# Patient Record
Sex: FEMALE | Race: WHITE | Employment: OTHER | URBAN - METROPOLITAN AREA
[De-identification: names, ages, dates, MRNs, and addresses within clinical notes are randomized per-mention and may not be internally consistent; named-entity substitution may affect disease eponyms.]

---

## 2017-02-23 RX ORDER — LORATADINE 10 MG/1
TABLET ORAL
Qty: 30 TABLET | Refills: 10 | Status: SHIPPED | OUTPATIENT
Start: 2017-02-23 | End: 2019-01-12 | Stop reason: SDUPTHER

## 2017-02-23 RX ORDER — DIPHENHYDRAMINE HCL 25 MG
CAPSULE ORAL
Qty: 30 CAPSULE | Refills: 10 | Status: SHIPPED | OUTPATIENT
Start: 2017-02-23 | End: 2019-01-12 | Stop reason: SDUPTHER

## 2017-02-23 RX ORDER — CLOMIPRAMINE HYDROCHLORIDE 50 MG/1
CAPSULE ORAL
Qty: 60 CAPSULE | Refills: 4 | Status: SHIPPED | OUTPATIENT
Start: 2017-02-23 | End: 2017-08-29 | Stop reason: SDUPTHER

## 2017-02-24 RX ORDER — PROPRANOLOL HYDROCHLORIDE 20 MG/1
TABLET ORAL
Qty: 180 TABLET | Refills: 4 | Status: SHIPPED | OUTPATIENT
Start: 2017-02-24 | End: 2017-08-29 | Stop reason: SDUPTHER

## 2017-03-09 RX ORDER — PERPHENAZINE 2 MG/1
TABLET, FILM COATED ORAL
Qty: 30 TABLET | Refills: 5 | Status: SHIPPED | OUTPATIENT
Start: 2017-03-09 | End: 2019-10-11 | Stop reason: SDUPTHER

## 2017-05-10 ENCOUNTER — OFFICE VISIT (OUTPATIENT)
Dept: INTERNAL MEDICINE | Age: 52
End: 2017-05-10

## 2017-05-10 VITALS
WEIGHT: 228 LBS | HEIGHT: 66 IN | DIASTOLIC BLOOD PRESSURE: 78 MMHG | SYSTOLIC BLOOD PRESSURE: 110 MMHG | BODY MASS INDEX: 36.64 KG/M2

## 2017-05-10 DIAGNOSIS — F84.0 AUTISM: Primary | ICD-10-CM

## 2017-05-10 DIAGNOSIS — F42.9 OCD (OBSESSIVE COMPULSIVE DISORDER): ICD-10-CM

## 2017-05-10 DIAGNOSIS — E66.9 OBESITY (BMI 35.0-39.9 WITHOUT COMORBIDITY): ICD-10-CM

## 2017-05-10 PROCEDURE — G8417 CALC BMI ABV UP PARAM F/U: HCPCS | Performed by: INTERNAL MEDICINE

## 2017-05-10 PROCEDURE — G8428 CUR MEDS NOT DOCUMENT: HCPCS | Performed by: INTERNAL MEDICINE

## 2017-05-10 PROCEDURE — 99213 OFFICE O/P EST LOW 20 MIN: CPT | Performed by: INTERNAL MEDICINE

## 2017-05-10 PROCEDURE — 3014F SCREEN MAMMO DOC REV: CPT | Performed by: INTERNAL MEDICINE

## 2017-05-10 PROCEDURE — 1036F TOBACCO NON-USER: CPT | Performed by: INTERNAL MEDICINE

## 2017-05-10 PROCEDURE — 3017F COLORECTAL CA SCREEN DOC REV: CPT | Performed by: INTERNAL MEDICINE

## 2017-05-10 ASSESSMENT — ENCOUNTER SYMPTOMS
CHEST TIGHTNESS: 0
EYE REDNESS: 0
COUGH: 0
ABDOMINAL PAIN: 0
SHORTNESS OF BREATH: 0
NAUSEA: 0
BACK PAIN: 0

## 2017-06-05 RX ORDER — EPINEPHRINE 0.3 MG/.3ML
INJECTION INTRAMUSCULAR
Qty: 2 EACH | Refills: 3 | Status: SHIPPED | OUTPATIENT
Start: 2017-06-05 | End: 2022-03-02 | Stop reason: SDUPTHER

## 2017-06-07 ENCOUNTER — TELEPHONE (OUTPATIENT)
Dept: INTERNAL MEDICINE | Age: 52
End: 2017-06-07

## 2017-07-28 RX ORDER — SERTRALINE HYDROCHLORIDE 100 MG/1
TABLET, FILM COATED ORAL
Qty: 45 TABLET | Refills: 5 | Status: SHIPPED | OUTPATIENT
Start: 2017-07-28 | End: 2018-12-18 | Stop reason: SDUPTHER

## 2017-08-22 ENCOUNTER — OFFICE VISIT (OUTPATIENT)
Dept: INTERNAL MEDICINE | Age: 52
End: 2017-08-22

## 2017-08-22 VITALS
DIASTOLIC BLOOD PRESSURE: 60 MMHG | BODY MASS INDEX: 37.65 KG/M2 | HEIGHT: 65 IN | WEIGHT: 226 LBS | SYSTOLIC BLOOD PRESSURE: 102 MMHG

## 2017-08-22 DIAGNOSIS — E66.9 OBESITY (BMI 35.0-39.9 WITHOUT COMORBIDITY): ICD-10-CM

## 2017-08-22 DIAGNOSIS — F42.2 MIXED OBSESSIONAL THOUGHTS AND ACTS: Primary | ICD-10-CM

## 2017-08-22 DIAGNOSIS — B35.3 TINEA PEDIS OF RIGHT FOOT: ICD-10-CM

## 2017-08-22 DIAGNOSIS — F79 MENTAL RETARDATION: ICD-10-CM

## 2017-08-22 PROCEDURE — 3014F SCREEN MAMMO DOC REV: CPT | Performed by: INTERNAL MEDICINE

## 2017-08-22 PROCEDURE — G8417 CALC BMI ABV UP PARAM F/U: HCPCS | Performed by: INTERNAL MEDICINE

## 2017-08-22 PROCEDURE — G8427 DOCREV CUR MEDS BY ELIG CLIN: HCPCS | Performed by: INTERNAL MEDICINE

## 2017-08-22 PROCEDURE — 1036F TOBACCO NON-USER: CPT | Performed by: INTERNAL MEDICINE

## 2017-08-22 PROCEDURE — 99213 OFFICE O/P EST LOW 20 MIN: CPT | Performed by: INTERNAL MEDICINE

## 2017-08-22 PROCEDURE — 3017F COLORECTAL CA SCREEN DOC REV: CPT | Performed by: INTERNAL MEDICINE

## 2017-08-29 RX ORDER — PROPRANOLOL HYDROCHLORIDE 20 MG/1
20 TABLET ORAL 3 TIMES DAILY
Qty: 180 TABLET | Refills: 4 | Status: SHIPPED | OUTPATIENT
Start: 2017-08-29 | End: 2017-08-31 | Stop reason: SDUPTHER

## 2017-08-29 RX ORDER — CLOMIPRAMINE HYDROCHLORIDE 50 MG/1
CAPSULE ORAL
Qty: 60 CAPSULE | Refills: 5 | Status: SHIPPED | OUTPATIENT
Start: 2017-08-29 | End: 2019-10-11 | Stop reason: SDUPTHER

## 2017-08-31 ENCOUNTER — TELEPHONE (OUTPATIENT)
Dept: INTERNAL MEDICINE | Age: 52
End: 2017-08-31

## 2017-08-31 RX ORDER — PROPRANOLOL HYDROCHLORIDE 20 MG/1
40 TABLET ORAL 3 TIMES DAILY
Qty: 180 TABLET | Refills: 4 | OUTPATIENT
Start: 2017-08-31 | End: 2019-08-20 | Stop reason: SDUPTHER

## 2017-09-21 ENCOUNTER — TELEPHONE (OUTPATIENT)
Dept: INTERNAL MEDICINE | Age: 52
End: 2017-09-21

## 2017-09-22 ENCOUNTER — TELEPHONE (OUTPATIENT)
Dept: INTERNAL MEDICINE | Age: 52
End: 2017-09-22

## 2017-10-03 ENCOUNTER — HOSPITAL ENCOUNTER (OUTPATIENT)
Dept: OTHER | Age: 52
Discharge: HOME | End: 2017-10-03
Attending: INTERNAL MEDICINE | Admitting: INTERNAL MEDICINE

## 2017-10-03 ENCOUNTER — OFFICE VISIT (OUTPATIENT)
Dept: INTERNAL MEDICINE | Age: 52
End: 2017-10-03

## 2017-10-03 VITALS
WEIGHT: 227 LBS | HEIGHT: 70 IN | DIASTOLIC BLOOD PRESSURE: 60 MMHG | SYSTOLIC BLOOD PRESSURE: 106 MMHG | BODY MASS INDEX: 32.5 KG/M2

## 2017-10-03 DIAGNOSIS — M25.552 LEFT HIP PAIN: ICD-10-CM

## 2017-10-03 DIAGNOSIS — M25.552 LEFT HIP PAIN: Primary | ICD-10-CM

## 2017-10-03 PROCEDURE — 3014F SCREEN MAMMO DOC REV: CPT | Performed by: INTERNAL MEDICINE

## 2017-10-03 PROCEDURE — 99213 OFFICE O/P EST LOW 20 MIN: CPT | Performed by: INTERNAL MEDICINE

## 2017-10-03 PROCEDURE — 3017F COLORECTAL CA SCREEN DOC REV: CPT | Performed by: INTERNAL MEDICINE

## 2017-10-03 PROCEDURE — G8427 DOCREV CUR MEDS BY ELIG CLIN: HCPCS | Performed by: INTERNAL MEDICINE

## 2017-10-03 PROCEDURE — G8484 FLU IMMUNIZE NO ADMIN: HCPCS | Performed by: INTERNAL MEDICINE

## 2017-10-03 PROCEDURE — G8417 CALC BMI ABV UP PARAM F/U: HCPCS | Performed by: INTERNAL MEDICINE

## 2017-10-03 PROCEDURE — 1036F TOBACCO NON-USER: CPT | Performed by: INTERNAL MEDICINE

## 2017-10-03 ASSESSMENT — ENCOUNTER SYMPTOMS
SHORTNESS OF BREATH: 0
NAUSEA: 0
BACK PAIN: 0
COUGH: 0
EYE REDNESS: 0
ABDOMINAL PAIN: 0
CHEST TIGHTNESS: 0

## 2017-10-03 NOTE — PROGRESS NOTES
Subjective:      Patient ID: Daniella Sagastume is a 46 y.o. female. Chief Complaint   Patient presents with    Foot Pain     left foot and leg pain       HPI  Solitario Hoff is here with her sister. Solitario Hoff has developmental delay and autism. She does not communicate verbally. She is walking with a slight limp on her left leg. This has been occurring over the last year. She had a sprain ankle earlier in the year. She does not verbalize any pain. Solitario Hoff is overweight. Current Outpatient Prescriptions on File Prior to Visit   Medication Sig Dispense Refill    propranolol (INDERAL) 20 MG tablet Take 2 tablets by mouth 3 times daily 180 tablet 4    clomiPRAMINE (ANAFRANIL) 50 MG capsule TAKE TWO CAPSULES BY MOUTH EVERY DAY AT BEDTIME 60 capsule 5    Disposable Gloves (VINYL GLOVES MEDIUM) [facility] FACILITY REQUESTING ORDER FOR EXAM GLOVES 100 each 5    sertraline (ZOLOFT) 100 MG tablet TAKE 1 & 1/2 TABLETS (150MG) BY MOUTH EVERY DAY 45 tablet 5    EPIPEN 2-JAG 0.3 MG/0.3ML SOAJ injection USE AS DRECTED. TAKE WITH ON ALL OUTINGS. 2 each 3    perphenazine 2 MG tablet TAKE ONE TABLET BY MOUTH EVERY DAY AT BEDTIME 30 tablet 5    diphenhydrAMINE (BENADRYL) 25 MG capsule TAKE ONE CAPSULE BY MOUTH EVERY DAY AT BEDTIME 30 capsule 10    loratadine (CLARITIN) 10 MG tablet TAKE ONE TABLET BY MOUTH EVERY DAY 30 tablet 10    Clotrimazole 1 % OINT Apply 1 each topically 2 times daily as needed (rash) 60 g 2    BANOPHEN 25 MG tablet TAKE 1 TABLET BY MOUTH NIGHTLY (DIPHENHYDRAMINE) 30 tablet 2    triamcinolone (KENALOG) 0.1 % cream Apply topically 2 times daily. 30 g 1    polyethylene glycol (GLYCOLAX) powder Take 17 g by mouth nightly.  EPINEPHrine (EPIPEN 2-JAG IJ) Inject  as directed. No current facility-administered medications on file prior to visit.       Allergies   Allergen Reactions    Amoxil [Amoxicillin]     Bactrim [Sulfamethoxazole-Trimethoprim]     Codeine          Review of Systems   Constitutional:

## 2017-10-03 NOTE — MR AVS SNAPSHOT
After Visit Summary             Camille Knapp   10/3/2017 1:20 PM   Office Visit    Description:  Female : 1965   Provider:  Rasta Aranda MD   Department:  [facility] [address]              Your Follow-Up and Future Appointments         Below is a list of your follow-up and future appointments. This may not be a complete list as you may have made appointments directly with providers that we are not aware of or your providers may have made some for you. Please call your providers to confirm appointments. It is important to keep your appointments. Please bring your current insurance card, photo ID, co-pay, and all medication bottles to your appointment. If self-pay, payment is expected at the time of service. Your To-Do List     Future Appointments Provider Department Dept Phone    2017 1:00 PM Rasta Aranda MD [facility] [address] [phone]    If this is a sports or school physical please bring the physical form with you. Future Orders Complete By Expires    XR HIP LEFT (2-3 VIEWS) [[id number] Custom]  10/3/2017 10/3/2018    Follow-Up    Return if symptoms worsen or fail to improve. Information from Your Visit        Department     Name Address Phone Fax    [facility] [id number] [place] [address] [phone]      You Were Seen for:         Comments    Left hip pain   [[id number]]         Vital Signs     Blood Pressure Height Weight Body Mass Index Smoking Status       106/60 5' 10\" (1.778 m) 227 lb (103 kg) 32.57 kg/m2 Never Smoker       Additional Information about your Body Mass Index (BMI)           Your BMI as listed above is considered obese (30 or more). BMI is an estimate of body fat, calculated from your height and weight.   The higher your BMI, the greater your risk of heart disease, high blood pressure, type 2 diabetes, stroke, gallstones, arthritis, sleep apnea, and certain

## 2017-10-23 ENCOUNTER — TELEPHONE (OUTPATIENT)
Dept: INTERNAL MEDICINE | Age: 52
End: 2017-10-23

## 2017-10-23 NOTE — TELEPHONE ENCOUNTER
Pt sister calling stating the hip pain is enough that she feels a pain medication would help her sister. Pt's sister is asking what Dr. Andrés Morton recommends for the hip pain and that it be prescribed      DO NOT- send any medications until a call comes in to where the script should go or if a written script will be picked up- 10/24 in the morning a call will let us know what to do.

## 2017-10-25 RX ORDER — NAPROXEN SODIUM 220 MG
220 TABLET ORAL
Qty: 30 TABLET | Refills: 2 | Status: SHIPPED | OUTPATIENT
Start: 2017-10-25 | End: 2018-06-21 | Stop reason: SDUPTHER

## 2017-12-15 ENCOUNTER — OFFICE VISIT (OUTPATIENT)
Dept: INTERNAL MEDICINE | Age: 52
End: 2017-12-15

## 2017-12-15 VITALS
SYSTOLIC BLOOD PRESSURE: 108 MMHG | DIASTOLIC BLOOD PRESSURE: 82 MMHG | WEIGHT: 233 LBS | HEART RATE: 86 BPM | BODY MASS INDEX: 33.43 KG/M2

## 2017-12-15 DIAGNOSIS — Z00.00 WELL ADULT EXAM: ICD-10-CM

## 2017-12-15 DIAGNOSIS — E66.9 OBESITY (BMI 35.0-39.9 WITHOUT COMORBIDITY): ICD-10-CM

## 2017-12-15 DIAGNOSIS — F42.2 MIXED OBSESSIONAL THOUGHTS AND ACTS: ICD-10-CM

## 2017-12-15 DIAGNOSIS — M16.12 PRIMARY OSTEOARTHRITIS OF LEFT HIP: ICD-10-CM

## 2017-12-15 DIAGNOSIS — Z00.00 WELL ADULT EXAM: Primary | ICD-10-CM

## 2017-12-15 DIAGNOSIS — R73.9 HYPERGLYCEMIA: ICD-10-CM

## 2017-12-15 DIAGNOSIS — F84.0 AUTISM: ICD-10-CM

## 2017-12-15 PROBLEM — G89.29 CHRONIC HIP PAIN, LEFT: Status: ACTIVE | Noted: 2017-12-15

## 2017-12-15 PROBLEM — M25.552 CHRONIC HIP PAIN, LEFT: Status: ACTIVE | Noted: 2017-12-15

## 2017-12-15 LAB
A/G RATIO: 1.3 (ref 1.1–2.2)
ALBUMIN SERPL-MCNC: 4 G/DL (ref 3.4–5)
ALP BLD-CCNC: 156 U/L (ref 40–129)
ALT SERPL-CCNC: 17 U/L (ref 10–40)
ANION GAP SERPL CALCULATED.3IONS-SCNC: 14 MMOL/L (ref 3–16)
AST SERPL-CCNC: 18 U/L (ref 15–37)
BASOPHILS ABSOLUTE: 0 K/UL (ref 0–0.2)
BASOPHILS RELATIVE PERCENT: 0.5 %
BILIRUB SERPL-MCNC: <0.2 MG/DL (ref 0–1)
BUN BLDV-MCNC: 21 MG/DL (ref 7–20)
CALCIUM SERPL-MCNC: 9.4 MG/DL (ref 8.3–10.6)
CHLORIDE BLD-SCNC: 104 MMOL/L (ref 99–110)
CHOLESTEROL, TOTAL: 205 MG/DL (ref 0–199)
CO2: 25 MMOL/L (ref 21–32)
CREAT SERPL-MCNC: 0.9 MG/DL (ref 0.6–1.1)
EOSINOPHILS ABSOLUTE: 0.3 K/UL (ref 0–0.6)
EOSINOPHILS RELATIVE PERCENT: 3 %
GFR AFRICAN AMERICAN: >60
GFR NON-AFRICAN AMERICAN: >60
GLOBULIN: 3.1 G/DL
GLUCOSE BLD-MCNC: 104 MG/DL (ref 70–99)
HCT VFR BLD CALC: 43.8 % (ref 36–48)
HDLC SERPL-MCNC: 54 MG/DL (ref 40–60)
HEMOGLOBIN: 14.3 G/DL (ref 12–16)
LDL CHOLESTEROL CALCULATED: 111 MG/DL
LYMPHOCYTES ABSOLUTE: 2.5 K/UL (ref 1–5.1)
LYMPHOCYTES RELATIVE PERCENT: 27 %
MCH RBC QN AUTO: 29.9 PG (ref 26–34)
MCHC RBC AUTO-ENTMCNC: 32.6 G/DL (ref 31–36)
MCV RBC AUTO: 91.7 FL (ref 80–100)
MONOCYTES ABSOLUTE: 0.7 K/UL (ref 0–1.3)
MONOCYTES RELATIVE PERCENT: 7.7 %
NEUTROPHILS ABSOLUTE: 5.8 K/UL (ref 1.7–7.7)
NEUTROPHILS RELATIVE PERCENT: 61.8 %
PDW BLD-RTO: 14 % (ref 12.4–15.4)
PLATELET # BLD: 253 K/UL (ref 135–450)
PMV BLD AUTO: 10 FL (ref 5–10.5)
POTASSIUM SERPL-SCNC: 4.8 MMOL/L (ref 3.5–5.1)
RBC # BLD: 4.77 M/UL (ref 4–5.2)
SODIUM BLD-SCNC: 143 MMOL/L (ref 136–145)
TOTAL PROTEIN: 7.1 G/DL (ref 6.4–8.2)
TRIGL SERPL-MCNC: 202 MG/DL (ref 0–150)
TSH REFLEX: 2.07 UIU/ML (ref 0.27–4.2)
VLDLC SERPL CALC-MCNC: 40 MG/DL
WBC # BLD: 9.4 K/UL (ref 4–11)

## 2017-12-15 PROCEDURE — G0439 PPPS, SUBSEQ VISIT: HCPCS | Performed by: INTERNAL MEDICINE

## 2017-12-15 ASSESSMENT — PATIENT HEALTH QUESTIONNAIRE - PHQ9
1. LITTLE INTEREST OR PLEASURE IN DOING THINGS: 0
SUM OF ALL RESPONSES TO PHQ QUESTIONS 1-9: 0
SUM OF ALL RESPONSES TO PHQ9 QUESTIONS 1 & 2: 0

## 2017-12-15 ASSESSMENT — ENCOUNTER SYMPTOMS
ABDOMINAL PAIN: 0
SHORTNESS OF BREATH: 0

## 2017-12-15 NOTE — PROGRESS NOTES
Subjective:      Patient ID: Peyton Rain is a 45 y.o. female. Chief Complaint   Patient presents with    Annual Exam     medicare physical     She is here with her sister. [facility]     Well check. She is living in a group home setting. The facility has been checked for accident reduction. She is walking with a limp on her bad left hip. She does not have Advance Directives. Current Outpatient Prescriptions on File Prior to Visit   Medication Sig Dispense Refill    naproxen sodium (ALEVE) 220 MG tablet Take 1 tablet by mouth daily (with breakfast) 30 tablet 2    propranolol (INDERAL) 20 MG tablet Take 2 tablets by mouth 3 times daily 180 tablet 4    clomiPRAMINE (ANAFRANIL) 50 MG capsule TAKE TWO CAPSULES BY MOUTH EVERY DAY AT BEDTIME 60 capsule 5    Disposable Gloves (VINYL GLOVES MEDIUM) MISC FACILITY REQUESTING ORDER FOR EXAM GLOVES 100 each 5    sertraline (ZOLOFT) 100 MG tablet TAKE 1 & 1/2 TABLETS (150MG) BY MOUTH EVERY DAY 45 tablet 5    EPIPEN 2-JAG 0.3 MG/0.3ML SOAJ injection USE AS DRECTED. TAKE WITH ON ALL OUTINGS. 2 each 3    perphenazine 2 MG tablet TAKE ONE TABLET BY MOUTH EVERY DAY AT BEDTIME 30 tablet 5    diphenhydrAMINE (BENADRYL) 25 MG capsule TAKE ONE CAPSULE BY MOUTH EVERY DAY AT BEDTIME 30 capsule 10    loratadine (CLARITIN) 10 MG tablet TAKE ONE TABLET BY MOUTH EVERY DAY 30 tablet 10    Clotrimazole 1 % OINT Apply 1 each topically 2 times daily as needed (rash) 60 g 2    BANOPHEN 25 MG tablet TAKE 1 TABLET BY MOUTH NIGHTLY (DIPHENHYDRAMINE) 30 tablet 2    triamcinolone (KENALOG) 0.1 % cream Apply topically 2 times daily. 30 g 1    polyethylene glycol (GLYCOLAX) powder Take 17 g by mouth nightly.  EPINEPHrine (EPIPEN 2-JAG IJ) Inject  as directed. No current facility-administered medications on file prior to visit.       Allergies   Allergen Reactions    Amoxil [Amoxicillin]     Bactrim [Sulfamethoxazole-Trimethoprim]     Codeine      Past Medical History:

## 2017-12-16 LAB
ESTIMATED AVERAGE GLUCOSE: 122.6 MG/DL
HBA1C MFR BLD: 5.9 %

## 2017-12-19 ENCOUNTER — TELEPHONE (OUTPATIENT)
Dept: INTERNAL MEDICINE | Age: 52
End: 2017-12-19

## 2017-12-19 NOTE — TELEPHONE ENCOUNTER
Pt's sister said she dropped paperwork  off for guardianship about 11 days. It was in a large envelope . Pt's sister would like a call to advise if we have this paperwork.  Please advise

## 2018-03-20 ENCOUNTER — TELEPHONE (OUTPATIENT)
Dept: INTERNAL MEDICINE | Age: 53
End: 2018-03-20

## 2018-03-20 DIAGNOSIS — F84.0 AUTISM: Primary | ICD-10-CM

## 2018-03-20 DIAGNOSIS — R32 INCONTINENCE IN FEMALE: ICD-10-CM

## 2018-04-19 RX ORDER — EPINEPHRINE 0.3 MG/.3ML
INJECTION INTRAMUSCULAR
Qty: 2 EACH | Refills: 5 | Status: SHIPPED | OUTPATIENT
Start: 2018-04-19 | End: 2019-04-05 | Stop reason: SDUPTHER

## 2018-04-27 ENCOUNTER — TELEPHONE (OUTPATIENT)
Dept: ORTHOPEDIC SURGERY | Age: 53
End: 2018-04-27

## 2018-06-20 ENCOUNTER — OFFICE VISIT (OUTPATIENT)
Dept: INTERNAL MEDICINE | Age: 53
End: 2018-06-20

## 2018-06-20 VITALS
DIASTOLIC BLOOD PRESSURE: 80 MMHG | SYSTOLIC BLOOD PRESSURE: 110 MMHG | OXYGEN SATURATION: 98 % | RESPIRATION RATE: 16 BRPM | WEIGHT: 243 LBS | HEIGHT: 69 IN | HEART RATE: 87 BPM | BODY MASS INDEX: 35.99 KG/M2

## 2018-06-20 DIAGNOSIS — G89.29 CHRONIC HIP PAIN, LEFT: Primary | ICD-10-CM

## 2018-06-20 DIAGNOSIS — M25.552 CHRONIC HIP PAIN, LEFT: Primary | ICD-10-CM

## 2018-06-20 DIAGNOSIS — Z12.11 ENCOUNTER FOR SCREENING COLONOSCOPY: ICD-10-CM

## 2018-06-20 PROCEDURE — 3017F COLORECTAL CA SCREEN DOC REV: CPT | Performed by: INTERNAL MEDICINE

## 2018-06-20 PROCEDURE — G8417 CALC BMI ABV UP PARAM F/U: HCPCS | Performed by: INTERNAL MEDICINE

## 2018-06-20 PROCEDURE — 1036F TOBACCO NON-USER: CPT | Performed by: INTERNAL MEDICINE

## 2018-06-20 PROCEDURE — 99213 OFFICE O/P EST LOW 20 MIN: CPT | Performed by: INTERNAL MEDICINE

## 2018-06-20 PROCEDURE — G8427 DOCREV CUR MEDS BY ELIG CLIN: HCPCS | Performed by: INTERNAL MEDICINE

## 2018-06-20 PROCEDURE — G8510 SCR DEP NEG, NO PLAN REQD: HCPCS | Performed by: INTERNAL MEDICINE

## 2018-06-20 ASSESSMENT — PATIENT HEALTH QUESTIONNAIRE - PHQ9
SUM OF ALL RESPONSES TO PHQ QUESTIONS 1-9: 0
1. LITTLE INTEREST OR PLEASURE IN DOING THINGS: 0
SUM OF ALL RESPONSES TO PHQ9 QUESTIONS 1 & 2: 0
2. FEELING DOWN, DEPRESSED OR HOPELESS: 0

## 2018-06-20 ASSESSMENT — ENCOUNTER SYMPTOMS: SHORTNESS OF BREATH: 0

## 2018-06-21 ENCOUNTER — TELEPHONE (OUTPATIENT)
Dept: INTERNAL MEDICINE | Age: 53
End: 2018-06-21

## 2018-06-21 RX ORDER — NAPROXEN SODIUM 220 MG
220 TABLET ORAL
Qty: 30 TABLET | Refills: 2 | Status: SHIPPED | OUTPATIENT
Start: 2018-06-21 | End: 2018-08-28 | Stop reason: ALTCHOICE

## 2018-08-28 ENCOUNTER — TELEPHONE (OUTPATIENT)
Dept: INTERNAL MEDICINE CLINIC | Age: 53
End: 2018-08-28

## 2019-01-14 RX ORDER — LORATADINE 10 MG/1
TABLET ORAL
Qty: 31 TABLET | Refills: 1 | Status: SHIPPED | OUTPATIENT
Start: 2019-01-14 | End: 2019-02-25 | Stop reason: SDUPTHER

## 2019-01-14 RX ORDER — DIPHENHYDRAMINE HCL 25 MG
CAPSULE ORAL
Qty: 31 CAPSULE | Refills: 1 | Status: SHIPPED | OUTPATIENT
Start: 2019-01-14 | End: 2019-03-22 | Stop reason: SDUPTHER

## 2019-01-28 ENCOUNTER — TELEPHONE (OUTPATIENT)
Dept: INTERNAL MEDICINE CLINIC | Age: 54
End: 2019-01-28

## 2019-01-28 DIAGNOSIS — G89.29 CHRONIC HIP PAIN, LEFT: Primary | ICD-10-CM

## 2019-01-28 DIAGNOSIS — M25.552 CHRONIC HIP PAIN, LEFT: Primary | ICD-10-CM

## 2019-01-28 RX ORDER — ACETAMINOPHEN 500 MG
1000 TABLET ORAL 2 TIMES DAILY
Qty: 120 TABLET | Refills: 5 | Status: SHIPPED | OUTPATIENT
Start: 2019-01-28 | End: 2019-02-13 | Stop reason: SDUPTHER

## 2019-02-13 ENCOUNTER — OFFICE VISIT (OUTPATIENT)
Dept: INTERNAL MEDICINE CLINIC | Age: 54
End: 2019-02-13
Payer: MEDICARE

## 2019-02-13 VITALS
RESPIRATION RATE: 16 BRPM | HEART RATE: 95 BPM | SYSTOLIC BLOOD PRESSURE: 100 MMHG | HEIGHT: 69 IN | BODY MASS INDEX: 35.7 KG/M2 | OXYGEN SATURATION: 97 % | WEIGHT: 241 LBS | DIASTOLIC BLOOD PRESSURE: 70 MMHG

## 2019-02-13 DIAGNOSIS — Z00.00 WELL ADULT EXAM: Primary | ICD-10-CM

## 2019-02-13 DIAGNOSIS — E78.2 MIXED HYPERLIPIDEMIA: ICD-10-CM

## 2019-02-13 DIAGNOSIS — E66.9 OBESITY (BMI 35.0-39.9 WITHOUT COMORBIDITY): ICD-10-CM

## 2019-02-13 DIAGNOSIS — M25.552 CHRONIC HIP PAIN, LEFT: ICD-10-CM

## 2019-02-13 DIAGNOSIS — B36.9 FUNGAL DERMATITIS: ICD-10-CM

## 2019-02-13 DIAGNOSIS — Z00.00 ROUTINE GENERAL MEDICAL EXAMINATION AT A HEALTH CARE FACILITY: ICD-10-CM

## 2019-02-13 DIAGNOSIS — G89.29 CHRONIC HIP PAIN, LEFT: ICD-10-CM

## 2019-02-13 DIAGNOSIS — F84.0 AUTISM: ICD-10-CM

## 2019-02-13 DIAGNOSIS — Z12.11 SCREEN FOR COLON CANCER: ICD-10-CM

## 2019-02-13 DIAGNOSIS — R73.9 HYPERGLYCEMIA: ICD-10-CM

## 2019-02-13 LAB
A/G RATIO: 1.3 (ref 1.1–2.2)
ALBUMIN SERPL-MCNC: 4 G/DL (ref 3.4–5)
ALP BLD-CCNC: 194 U/L (ref 40–129)
ALT SERPL-CCNC: 39 U/L (ref 10–40)
ANION GAP SERPL CALCULATED.3IONS-SCNC: 13 MMOL/L (ref 3–16)
AST SERPL-CCNC: 28 U/L (ref 15–37)
BASOPHILS ABSOLUTE: 0.1 K/UL (ref 0–0.2)
BASOPHILS RELATIVE PERCENT: 0.6 %
BILIRUB SERPL-MCNC: <0.2 MG/DL (ref 0–1)
BUN BLDV-MCNC: 30 MG/DL (ref 7–20)
CALCIUM SERPL-MCNC: 9.8 MG/DL (ref 8.3–10.6)
CHLORIDE BLD-SCNC: 101 MMOL/L (ref 99–110)
CHOLESTEROL, TOTAL: 162 MG/DL (ref 0–199)
CO2: 24 MMOL/L (ref 21–32)
CREAT SERPL-MCNC: 0.9 MG/DL (ref 0.6–1.1)
EOSINOPHILS ABSOLUTE: 0.3 K/UL (ref 0–0.6)
EOSINOPHILS RELATIVE PERCENT: 2.6 %
GFR AFRICAN AMERICAN: >60
GFR NON-AFRICAN AMERICAN: >60
GLOBULIN: 3.1 G/DL
GLUCOSE BLD-MCNC: 79 MG/DL (ref 70–99)
HCT VFR BLD CALC: 43.7 % (ref 36–48)
HDLC SERPL-MCNC: 50 MG/DL (ref 40–60)
HEMOGLOBIN: 14 G/DL (ref 12–16)
LDL CHOLESTEROL CALCULATED: 86 MG/DL
LYMPHOCYTES ABSOLUTE: 2.2 K/UL (ref 1–5.1)
LYMPHOCYTES RELATIVE PERCENT: 22.6 %
MCH RBC QN AUTO: 29.6 PG (ref 26–34)
MCHC RBC AUTO-ENTMCNC: 32.1 G/DL (ref 31–36)
MCV RBC AUTO: 92.3 FL (ref 80–100)
MONOCYTES ABSOLUTE: 0.8 K/UL (ref 0–1.3)
MONOCYTES RELATIVE PERCENT: 8.1 %
NEUTROPHILS ABSOLUTE: 6.4 K/UL (ref 1.7–7.7)
NEUTROPHILS RELATIVE PERCENT: 66.1 %
PDW BLD-RTO: 14.1 % (ref 12.4–15.4)
PLATELET # BLD: 283 K/UL (ref 135–450)
PMV BLD AUTO: 10.7 FL (ref 5–10.5)
POTASSIUM SERPL-SCNC: 5.2 MMOL/L (ref 3.5–5.1)
RBC # BLD: 4.74 M/UL (ref 4–5.2)
SODIUM BLD-SCNC: 138 MMOL/L (ref 136–145)
TOTAL PROTEIN: 7.1 G/DL (ref 6.4–8.2)
TRIGL SERPL-MCNC: 129 MG/DL (ref 0–150)
TSH REFLEX: 2.5 UIU/ML (ref 0.27–4.2)
VLDLC SERPL CALC-MCNC: 26 MG/DL
WBC # BLD: 9.7 K/UL (ref 4–11)

## 2019-02-13 PROCEDURE — G8484 FLU IMMUNIZE NO ADMIN: HCPCS | Performed by: INTERNAL MEDICINE

## 2019-02-13 PROCEDURE — G0438 PPPS, INITIAL VISIT: HCPCS | Performed by: INTERNAL MEDICINE

## 2019-02-13 RX ORDER — ACETAMINOPHEN 500 MG
1000 TABLET ORAL 3 TIMES DAILY
Qty: 270 TABLET | Refills: 5 | Status: SHIPPED | OUTPATIENT
Start: 2019-02-13 | End: 2019-04-22 | Stop reason: SDUPTHER

## 2019-02-13 RX ORDER — CLOTRIMAZOLE 1 %
CREAM (GRAM) TOPICAL
Qty: 28 G | Refills: 1 | Status: SHIPPED | OUTPATIENT
Start: 2019-02-13 | End: 2019-02-20

## 2019-02-13 ASSESSMENT — ENCOUNTER SYMPTOMS
CONSTIPATION: 0
SORE THROAT: 0
TROUBLE SWALLOWING: 0
EYE PROBLEMS: 0
SHORTNESS OF BREATH: 0
NAUSEA: 0
ABDOMINAL PAIN: 0
ABDOMINAL DISTENTION: 0
VOMITING: 0

## 2019-02-13 ASSESSMENT — PATIENT HEALTH QUESTIONNAIRE - PHQ9
SUM OF ALL RESPONSES TO PHQ QUESTIONS 1-9: 0
SUM OF ALL RESPONSES TO PHQ QUESTIONS 1-9: 0

## 2019-02-13 ASSESSMENT — LIFESTYLE VARIABLES: HOW OFTEN DO YOU HAVE A DRINK CONTAINING ALCOHOL: 0

## 2019-02-13 ASSESSMENT — ANXIETY QUESTIONNAIRES: GAD7 TOTAL SCORE: 0

## 2019-02-14 LAB
ESTIMATED AVERAGE GLUCOSE: 116.9 MG/DL
HBA1C MFR BLD: 5.7 %

## 2019-02-19 DIAGNOSIS — Z12.11 SCREEN FOR COLON CANCER: ICD-10-CM

## 2019-02-19 LAB
CONTROL: NORMAL
HEMOCCULT STL QL: NORMAL

## 2019-02-19 PROCEDURE — 82274 ASSAY TEST FOR BLOOD FECAL: CPT | Performed by: INTERNAL MEDICINE

## 2019-02-25 RX ORDER — LORATADINE 10 MG/1
TABLET ORAL
Qty: 31 TABLET | Refills: 1 | Status: SHIPPED | OUTPATIENT
Start: 2019-02-25 | End: 2019-10-11 | Stop reason: SDUPTHER

## 2019-02-28 DIAGNOSIS — F84.0 AUTISM: ICD-10-CM

## 2019-02-28 DIAGNOSIS — R32 INCONTINENCE IN FEMALE: ICD-10-CM

## 2019-03-19 ENCOUNTER — TELEPHONE (OUTPATIENT)
Dept: INTERNAL MEDICINE CLINIC | Age: 54
End: 2019-03-19

## 2019-03-26 DIAGNOSIS — M16.12 PRIMARY OSTEOARTHRITIS OF LEFT HIP: Primary | ICD-10-CM

## 2019-03-26 RX ORDER — DICLOFENAC SODIUM 75 MG/1
75 TABLET, DELAYED RELEASE ORAL 2 TIMES DAILY
Qty: 60 TABLET | Refills: 2 | Status: SHIPPED | OUTPATIENT
Start: 2019-03-26 | End: 2019-07-19 | Stop reason: SDUPTHER

## 2019-04-05 RX ORDER — EPINEPHRINE 0.3 MG/.3ML
INJECTION SUBCUTANEOUS
Qty: 2 EACH | Refills: 5 | Status: SHIPPED | OUTPATIENT
Start: 2019-04-05 | End: 2020-08-20 | Stop reason: SDUPTHER

## 2019-04-08 ENCOUNTER — OFFICE VISIT (OUTPATIENT)
Dept: INTERNAL MEDICINE CLINIC | Age: 54
End: 2019-04-08
Payer: MEDICARE

## 2019-04-08 VITALS
WEIGHT: 233 LBS | HEIGHT: 69 IN | OXYGEN SATURATION: 98 % | HEART RATE: 82 BPM | RESPIRATION RATE: 16 BRPM | BODY MASS INDEX: 34.51 KG/M2 | DIASTOLIC BLOOD PRESSURE: 70 MMHG | SYSTOLIC BLOOD PRESSURE: 110 MMHG

## 2019-04-08 DIAGNOSIS — M16.12 PRIMARY OSTEOARTHRITIS OF LEFT HIP: ICD-10-CM

## 2019-04-08 DIAGNOSIS — I87.8 VENOUS STASIS OF LOWER EXTREMITY: Primary | ICD-10-CM

## 2019-04-08 PROCEDURE — G8417 CALC BMI ABV UP PARAM F/U: HCPCS | Performed by: INTERNAL MEDICINE

## 2019-04-08 PROCEDURE — 99213 OFFICE O/P EST LOW 20 MIN: CPT | Performed by: INTERNAL MEDICINE

## 2019-04-08 PROCEDURE — 1036F TOBACCO NON-USER: CPT | Performed by: INTERNAL MEDICINE

## 2019-04-08 PROCEDURE — G8427 DOCREV CUR MEDS BY ELIG CLIN: HCPCS | Performed by: INTERNAL MEDICINE

## 2019-04-08 PROCEDURE — 3017F COLORECTAL CA SCREEN DOC REV: CPT | Performed by: INTERNAL MEDICINE

## 2019-04-08 ASSESSMENT — PATIENT HEALTH QUESTIONNAIRE - PHQ9
SUM OF ALL RESPONSES TO PHQ9 QUESTIONS 1 & 2: 0
2. FEELING DOWN, DEPRESSED OR HOPELESS: 0
1. LITTLE INTEREST OR PLEASURE IN DOING THINGS: 0
SUM OF ALL RESPONSES TO PHQ QUESTIONS 1-9: 0
SUM OF ALL RESPONSES TO PHQ QUESTIONS 1-9: 0

## 2019-04-08 ASSESSMENT — ENCOUNTER SYMPTOMS: SHORTNESS OF BREATH: 0

## 2019-04-08 NOTE — PROGRESS NOTES
Subjective:      Patient ID: Emeterio Lim is a 52 y.o. female    Chief Complaint   Patient presents with    Leg Swelling     bilateral, worse in Rt leg, swelling in calves and feet      James Centeno is here with her sister. Leg Pain    The pain is present in the left hip. The quality of the pain is described as aching. The pain is at a severity of 7/10. The pain is moderate. Associated symptoms include a loss of motion. Associated symptoms comments: James Centeno is having slight erythema of both lower legs and feet. There is mild swelling in her feet worse on the right. She is markedly obese, she has been sitting in her chair almost continuously all day long, and she has been taking diclofenac twice a day. . The symptoms are aggravated by movement and weight bearing. She has tried non-weight bearing for the symptoms. The treatment provided moderate relief. Current Outpatient Medications on File Prior to Visit   Medication Sig Dispense Refill    EPINEPHrine (EPIPEN 2-JAG) 0.3 MG/0.3ML SOAJ injection USE AS DRECTED. TAKE WITH ON ALL OUTINGS.  2 each 5    diclofenac (VOLTAREN) 75 MG EC tablet Take 1 tablet by mouth 2 times daily 60 tablet 2    diphenhydrAMINE (BENADRYL) 25 MG capsule TAKE 1 CAPSULE BY MOUTH AT BEDTIME 30 capsule 11    Diapers & Supplies MISC USE 1 6 TIMES A  each 3    loratadine (CLARITIN) 10 MG tablet TAKE 1 TABLET BY MOUTH EVERY DAY 31 tablet 1    acetaminophen (APAP EXTRA STRENGTH) 500 MG tablet Take 2 tablets by mouth 3 times daily 270 tablet 5    Calcium-Vitamin D-Vitamin K (VIACTIV) 196-490-90 MG-UNT-MCG CHEW Take 1 tablet by mouth 2 times daily 60 tablet 5    sertraline (ZOLOFT) 100 MG tablet TAKE 1 & 1/2 TABLETS BY MOUTH ONCE DAILY 45 tablet 5    propranolol (INDERAL) 20 MG tablet Take 2 tablets by mouth 3 times daily 180 tablet 4    clomiPRAMINE (ANAFRANIL) 50 MG capsule TAKE TWO CAPSULES BY MOUTH EVERY DAY AT BEDTIME 60 capsule 5    Disposable Gloves (VINYL GLOVES MEDIUM) [facility] FACILITY REQUESTING ORDER FOR EXAM GLOVES 100 each 5    EPIPEN 2-JAG 0.3 MG/0.3ML SOAJ injection USE AS DRECTED. TAKE WITH ON ALL OUTINGS. 2 each 3    perphenazine 2 MG tablet TAKE ONE TABLET BY MOUTH EVERY DAY AT BEDTIME 30 tablet 5    Clotrimazole 1 % OINT Apply 1 each topically 2 times daily as needed (rash) 60 g 2    BANOPHEN 25 MG tablet TAKE 1 TABLET BY MOUTH NIGHTLY (DIPHENHYDRAMINE) 30 tablet 2    triamcinolone (KENALOG) 0.1 % cream Apply topically 2 times daily. 30 g 1    polyethylene glycol (GLYCOLAX) powder Take 17 g by mouth nightly.  EPINEPHrine (EPIPEN 2-JAG IJ) Inject  as directed. No current facility-administered medications on file prior to visit. Allergies   Allergen Reactions    Amoxil [Amoxicillin]     Bactrim [Sulfamethoxazole-Trimethoprim]     Codeine        Review of Systems   Constitutional: Negative for fatigue and unexpected weight change. HENT: Negative for congestion. Eyes: Negative for visual disturbance. Respiratory: Negative for shortness of breath. Cardiovascular: Positive for leg swelling. Negative for chest pain. Mild venous stasis in both lower extremities. Endocrine: Negative for polydipsia and polyuria. Genitourinary: Negative for dysuria. Allergic/Immunologic: Negative for environmental allergies. Neurological: Negative for headaches. Psychiatric/Behavioral: The patient is not nervous/anxious. Objective:   Physical Exam   Constitutional: She appears well-developed and well-nourished. Cardiovascular: Normal rate and regular rhythm. Pulmonary/Chest: Effort normal and breath sounds normal.   Musculoskeletal: She exhibits edema. Autistic, calm, sitting in a wheelchair, relatively cooperative, not in apparent pain , slight erythema of the lower legs, slight foot swelling       Assessment and plan       1. Venous stasis of lower extremity  She has a venous stasis of both lower extremities.  This is worse recently because of her sedentary lifestyle. She is also taking diclofenac twice a day. I suggested we try having her wear some support hose. 2. Primary osteoarthritis of left hip  Stable chronic arthritis of the left hip. She has scoliosis of her lower spine. Hip surgeon feels that she is high risk for dislocation of a total hip replacement due to her scoliosis. There is an increased risk and so her sister is reconsidering whether or not she should go ahead with hip replacement surgery.

## 2019-04-22 ENCOUNTER — TELEPHONE (OUTPATIENT)
Dept: INTERNAL MEDICINE CLINIC | Age: 54
End: 2019-04-22

## 2019-04-22 DIAGNOSIS — G89.29 CHRONIC HIP PAIN, LEFT: ICD-10-CM

## 2019-04-22 DIAGNOSIS — M25.552 CHRONIC HIP PAIN, LEFT: ICD-10-CM

## 2019-04-22 RX ORDER — ACETAMINOPHEN 500 MG
1000 TABLET ORAL 3 TIMES DAILY
Qty: 270 TABLET | Refills: 5 | Status: SHIPPED | OUTPATIENT
Start: 2019-04-22 | End: 2021-09-03

## 2019-04-22 NOTE — TELEPHONE ENCOUNTER
Pt needs refill on  acetaminophen (APAP EXTRA STRENGTH) 500 MG tablet.   Pharmacy on file verified  Please call

## 2019-04-25 ENCOUNTER — TELEPHONE (OUTPATIENT)
Dept: INTERNAL MEDICINE CLINIC | Age: 54
End: 2019-04-25

## 2019-04-25 DIAGNOSIS — M16.12 PRIMARY OSTEOARTHRITIS OF LEFT HIP: Primary | ICD-10-CM

## 2019-04-25 RX ORDER — ACETAMINOPHEN 500 MG
1000 TABLET ORAL 3 TIMES DAILY
Qty: 360 TABLET | Refills: 3 | Status: SHIPPED | OUTPATIENT
Start: 2019-04-25 | End: 2021-07-14

## 2019-05-15 ENCOUNTER — OFFICE VISIT (OUTPATIENT)
Dept: INTERNAL MEDICINE CLINIC | Age: 54
End: 2019-05-15
Payer: MEDICARE

## 2019-05-15 VITALS — HEART RATE: 83 BPM | OXYGEN SATURATION: 95 % | SYSTOLIC BLOOD PRESSURE: 122 MMHG | DIASTOLIC BLOOD PRESSURE: 70 MMHG

## 2019-05-15 DIAGNOSIS — R60.9 DEPENDENT EDEMA: ICD-10-CM

## 2019-05-15 PROCEDURE — G8417 CALC BMI ABV UP PARAM F/U: HCPCS | Performed by: NURSE PRACTITIONER

## 2019-05-15 PROCEDURE — 99213 OFFICE O/P EST LOW 20 MIN: CPT | Performed by: NURSE PRACTITIONER

## 2019-05-15 PROCEDURE — G8427 DOCREV CUR MEDS BY ELIG CLIN: HCPCS | Performed by: NURSE PRACTITIONER

## 2019-05-15 PROCEDURE — 3017F COLORECTAL CA SCREEN DOC REV: CPT | Performed by: NURSE PRACTITIONER

## 2019-05-15 PROCEDURE — 1036F TOBACCO NON-USER: CPT | Performed by: NURSE PRACTITIONER

## 2019-05-15 NOTE — PROGRESS NOTES
Subjective:      Patient ID: Tony Love is a 52 y.o. female. Chief Complaint   Patient presents with    Swelling     swelling in lower legs and more in the right side, possible fluid in the leg     HPI   Yoselin Tripathi is here with her sister for concern of swollen legs. Patient is nonverbal. Her sister states they have been trying to get the patient to lose weight so that she can undergo a hip replacement. However, because of her hip pain they have been using a wheelchair and she has not been walking much. She reports during her water aerobics today her instructed mention that her legs seemed swollen. Patient is unable to give history, but her sister denies SOB, cough, redness, or increased pain with ambulation. No history of CHF. Review of Systems   Constitutional: Negative for chills, fatigue and fever. HENT: Negative for congestion, sinus pressure and sinus pain. Respiratory: Negative for cough, shortness of breath and wheezing. Cardiovascular: Positive for leg swelling. Negative for chest pain and palpitations. Gastrointestinal: Negative for diarrhea, nausea and vomiting. Musculoskeletal: Negative for arthralgias and myalgias. Neurological: Negative for dizziness, light-headedness and headaches. Hematological: Negative for adenopathy. Objective:   Physical Exam   Constitutional: She is oriented to person, place, and time. She appears well-developed and well-nourished. Cardiovascular: Normal rate, regular rhythm and normal heart sounds. Pulmonary/Chest: Effort normal and breath sounds normal. No respiratory distress. She has no wheezes. Abdominal: Soft. Bowel sounds are normal.   Musculoskeletal:        Right lower leg: She exhibits swelling. Left lower leg: She exhibits swelling. Mild swelling bilaterally. Non-pitting. Neurological: She is alert and oriented to person, place, and time. Skin: Skin is warm and dry. Psychiatric: She has a normal mood and affect. Her behavior is normal.     Assessment:      See Problem List assessment and plan       Plan:       Dependent edema  No adventitious breath sounds or heart sounds   Suggested decreased sodium intake   Keep legs elevated as much as possible   Continue exercise   Call if symptoms worsen or fail to improve             Patient engaged in shared decision making. Information given to evaluate options of treatment, understand what is needed and discuss importance of following plan.

## 2019-05-16 PROBLEM — R60.9 DEPENDENT EDEMA: Status: ACTIVE | Noted: 2019-05-16

## 2019-05-16 ASSESSMENT — ENCOUNTER SYMPTOMS
DIARRHEA: 0
COUGH: 0
VOMITING: 0
SHORTNESS OF BREATH: 0
WHEEZING: 0
SINUS PRESSURE: 0
SINUS PAIN: 0
NAUSEA: 0

## 2019-05-16 NOTE — ASSESSMENT & PLAN NOTE
No adventitious breath sounds or heart sounds   Suggested decreased sodium intake   Keep legs elevated as much as possible   Continue exercise   Call if symptoms worsen or fail to improve

## 2019-05-17 ENCOUNTER — TELEPHONE (OUTPATIENT)
Dept: INTERNAL MEDICINE CLINIC | Age: 54
End: 2019-05-17

## 2019-05-17 NOTE — TELEPHONE ENCOUNTER
Patient's sister calling requesting to get a  referral for physical therapy and mentioned a particular Physical Therapy place they wanted to try. Patient' sister didn't have all her information. Advised to call back prior to placing referral with all info needed.

## 2019-07-19 DIAGNOSIS — M16.12 PRIMARY OSTEOARTHRITIS OF LEFT HIP: ICD-10-CM

## 2019-07-19 RX ORDER — DICLOFENAC SODIUM 75 MG/1
75 TABLET, DELAYED RELEASE ORAL 2 TIMES DAILY
Qty: 60 TABLET | Refills: 2 | Status: SHIPPED | OUTPATIENT
Start: 2019-07-19 | End: 2019-10-11 | Stop reason: SDUPTHER

## 2019-08-14 ENCOUNTER — OFFICE VISIT (OUTPATIENT)
Dept: INTERNAL MEDICINE CLINIC | Age: 54
End: 2019-08-14
Payer: MEDICARE

## 2019-08-14 VITALS
SYSTOLIC BLOOD PRESSURE: 100 MMHG | RESPIRATION RATE: 14 BRPM | WEIGHT: 225 LBS | HEART RATE: 78 BPM | BODY MASS INDEX: 33.33 KG/M2 | DIASTOLIC BLOOD PRESSURE: 62 MMHG | OXYGEN SATURATION: 100 % | HEIGHT: 69 IN

## 2019-08-14 DIAGNOSIS — M16.12 PRIMARY OSTEOARTHRITIS OF LEFT HIP: Primary | ICD-10-CM

## 2019-08-14 DIAGNOSIS — Z91.89 AT RISK FOR SIDE EFFECT OF MEDICATION: ICD-10-CM

## 2019-08-14 PROCEDURE — 3017F COLORECTAL CA SCREEN DOC REV: CPT | Performed by: INTERNAL MEDICINE

## 2019-08-14 PROCEDURE — G8510 SCR DEP NEG, NO PLAN REQD: HCPCS | Performed by: INTERNAL MEDICINE

## 2019-08-14 PROCEDURE — G8427 DOCREV CUR MEDS BY ELIG CLIN: HCPCS | Performed by: INTERNAL MEDICINE

## 2019-08-14 PROCEDURE — G8417 CALC BMI ABV UP PARAM F/U: HCPCS | Performed by: INTERNAL MEDICINE

## 2019-08-14 PROCEDURE — 99213 OFFICE O/P EST LOW 20 MIN: CPT | Performed by: INTERNAL MEDICINE

## 2019-08-14 PROCEDURE — 1036F TOBACCO NON-USER: CPT | Performed by: INTERNAL MEDICINE

## 2019-08-14 ASSESSMENT — ENCOUNTER SYMPTOMS
BACK PAIN: 0
NAUSEA: 0
COUGH: 0
CHEST TIGHTNESS: 0
SHORTNESS OF BREATH: 0
ABDOMINAL PAIN: 0
EYE REDNESS: 0

## 2019-08-14 ASSESSMENT — PATIENT HEALTH QUESTIONNAIRE - PHQ9
SUM OF ALL RESPONSES TO PHQ QUESTIONS 1-9: 0
2. FEELING DOWN, DEPRESSED OR HOPELESS: 0
1. LITTLE INTEREST OR PLEASURE IN DOING THINGS: 0
SUM OF ALL RESPONSES TO PHQ QUESTIONS 1-9: 0
SUM OF ALL RESPONSES TO PHQ9 QUESTIONS 1 & 2: 0

## 2019-08-14 NOTE — PROGRESS NOTES
2 times daily as needed (rash) 60 g 2    BANOPHEN 25 MG tablet TAKE 1 TABLET BY MOUTH NIGHTLY (DIPHENHYDRAMINE) 30 tablet 2    triamcinolone (KENALOG) 0.1 % cream Apply topically 2 times daily. 30 g 1    polyethylene glycol (GLYCOLAX) powder Take 17 g by mouth nightly.  EPINEPHrine (EPIPEN 2-JAG IJ) Inject  as directed. No current facility-administered medications on file prior to visit. Allergies   Allergen Reactions    Amoxil [Amoxicillin]     Bactrim [Sulfamethoxazole-Trimethoprim]     Codeine        Review of Systems   Constitutional: Negative for fatigue, fever and unexpected weight change. Autistic   HENT: Negative for congestion and hearing loss. Eyes: Negative for redness and visual disturbance. Respiratory: Negative for cough, chest tightness and shortness of breath. Cardiovascular: Negative for chest pain and leg swelling. Gastrointestinal: Negative for abdominal pain and nausea. Genitourinary: Negative for dysuria and frequency. Musculoskeletal: Negative for arthralgias, back pain and neck pain. Skin: Negative for rash and wound. Neurological: Negative for dizziness, weakness and headaches. Hematological: Negative for adenopathy. Does not bruise/bleed easily. Psychiatric/Behavioral: Positive for confusion. Negative for sleep disturbance. The patient is not nervous/anxious. Objective:   Physical Exam   Constitutional:   Autistic patient in wheelchair   Cardiovascular: Normal rate and regular rhythm. Pulmonary/Chest: Effort normal and breath sounds normal.   Musculoskeletal:   Left hip weakness, stiffness and pain. Assessment and plan       1. Primary osteoarthritis of left hip  Stable. Continue aqua therapy. Continue anti-inflammatory medication. Check labs. - Comprehensive Metabolic Panel; Future  - CBC Auto Differential; Future    2. At risk for side effect of medication  Check labs. - Comprehensive Metabolic Panel;  Future  - CBC

## 2019-08-21 RX ORDER — PROPRANOLOL HYDROCHLORIDE 20 MG/1
TABLET ORAL
Qty: 186 TABLET | Refills: 4 | Status: SHIPPED | OUTPATIENT
Start: 2019-08-21 | End: 2021-06-04

## 2019-08-30 DIAGNOSIS — M16.12 PRIMARY OSTEOARTHRITIS OF LEFT HIP: ICD-10-CM

## 2019-08-30 DIAGNOSIS — Z91.89 AT RISK FOR SIDE EFFECT OF MEDICATION: ICD-10-CM

## 2019-08-30 LAB
A/G RATIO: 1.4 (ref 1.1–2.2)
ALBUMIN SERPL-MCNC: 4 G/DL (ref 3.4–5)
ALP BLD-CCNC: 195 U/L (ref 40–129)
ALT SERPL-CCNC: 48 U/L (ref 10–40)
ANION GAP SERPL CALCULATED.3IONS-SCNC: 13 MMOL/L (ref 3–16)
AST SERPL-CCNC: 27 U/L (ref 15–37)
BASOPHILS ABSOLUTE: 0 K/UL (ref 0–0.2)
BASOPHILS RELATIVE PERCENT: 0.5 %
BILIRUB SERPL-MCNC: <0.2 MG/DL (ref 0–1)
BUN BLDV-MCNC: 29 MG/DL (ref 7–20)
CALCIUM SERPL-MCNC: 9.1 MG/DL (ref 8.3–10.6)
CHLORIDE BLD-SCNC: 104 MMOL/L (ref 99–110)
CO2: 23 MMOL/L (ref 21–32)
CREAT SERPL-MCNC: 0.9 MG/DL (ref 0.6–1.1)
EOSINOPHILS ABSOLUTE: 0.3 K/UL (ref 0–0.6)
EOSINOPHILS RELATIVE PERCENT: 2.7 %
GFR AFRICAN AMERICAN: >60
GFR NON-AFRICAN AMERICAN: >60
GLOBULIN: 2.8 G/DL
GLUCOSE BLD-MCNC: 100 MG/DL (ref 70–99)
HCT VFR BLD CALC: 39 % (ref 36–48)
HEMOGLOBIN: 13 G/DL (ref 12–16)
LYMPHOCYTES ABSOLUTE: 3.6 K/UL (ref 1–5.1)
LYMPHOCYTES RELATIVE PERCENT: 35.8 %
MCH RBC QN AUTO: 30.4 PG (ref 26–34)
MCHC RBC AUTO-ENTMCNC: 33.4 G/DL (ref 31–36)
MCV RBC AUTO: 91 FL (ref 80–100)
MONOCYTES ABSOLUTE: 0.7 K/UL (ref 0–1.3)
MONOCYTES RELATIVE PERCENT: 7.4 %
NEUTROPHILS ABSOLUTE: 5.4 K/UL (ref 1.7–7.7)
NEUTROPHILS RELATIVE PERCENT: 53.6 %
PDW BLD-RTO: 13.4 % (ref 12.4–15.4)
PLATELET # BLD: 229 K/UL (ref 135–450)
PMV BLD AUTO: 10.8 FL (ref 5–10.5)
POTASSIUM SERPL-SCNC: 4.7 MMOL/L (ref 3.5–5.1)
RBC # BLD: 4.29 M/UL (ref 4–5.2)
SODIUM BLD-SCNC: 140 MMOL/L (ref 136–145)
TOTAL PROTEIN: 6.8 G/DL (ref 6.4–8.2)
WBC # BLD: 10 K/UL (ref 4–11)

## 2019-09-16 ENCOUNTER — TELEPHONE (OUTPATIENT)
Dept: INTERNAL MEDICINE CLINIC | Age: 54
End: 2019-09-16

## 2019-09-23 PROBLEM — N39.41 URGE INCONTINENCE: Status: ACTIVE | Noted: 2019-09-23

## 2019-10-11 DIAGNOSIS — M16.12 PRIMARY OSTEOARTHRITIS OF LEFT HIP: ICD-10-CM

## 2019-10-14 RX ORDER — SERTRALINE HYDROCHLORIDE 100 MG/1
TABLET, FILM COATED ORAL
Qty: 30 TABLET | Refills: 0 | Status: SHIPPED | OUTPATIENT
Start: 2019-10-14 | End: 2020-04-07

## 2019-10-14 RX ORDER — PSEUDOEPHED/ACETAMINOPH/DIPHEN 30MG-500MG
TABLET ORAL
Qty: 180 TABLET | Refills: 0 | Status: SHIPPED | OUTPATIENT
Start: 2019-10-14 | End: 2020-04-07

## 2019-10-14 RX ORDER — LORATADINE 10 MG/1
TABLET ORAL
Qty: 30 TABLET | Refills: 0 | Status: SHIPPED | OUTPATIENT
Start: 2019-10-14 | End: 2020-04-07

## 2019-10-14 RX ORDER — DICLOFENAC SODIUM 75 MG/1
TABLET, DELAYED RELEASE ORAL
Qty: 60 TABLET | Refills: 0 | Status: SHIPPED | OUTPATIENT
Start: 2019-10-14 | End: 2020-04-07

## 2019-10-14 RX ORDER — CLOMIPRAMINE HYDROCHLORIDE 50 MG/1
CAPSULE ORAL
Qty: 60 CAPSULE | Refills: 0 | Status: SHIPPED | OUTPATIENT
Start: 2019-10-14 | End: 2020-04-07

## 2019-10-14 RX ORDER — PERPHENAZINE 2 MG/1
TABLET, FILM COATED ORAL
Qty: 30 TABLET | Refills: 0 | Status: SHIPPED | OUTPATIENT
Start: 2019-10-14 | End: 2020-04-07

## 2019-10-14 RX ORDER — DIPHENHYDRAMINE HYDROCHLORIDE 25 MG/1
CAPSULE ORAL
Qty: 30 CAPSULE | Refills: 0 | Status: SHIPPED | OUTPATIENT
Start: 2019-10-14 | End: 2020-04-07

## 2019-10-14 RX ORDER — PROPRANOLOL HYDROCHLORIDE 40 MG/1
TABLET ORAL
Qty: 90 TABLET | Refills: 0 | Status: SHIPPED | OUTPATIENT
Start: 2019-10-14 | End: 2020-04-07

## 2019-10-15 ENCOUNTER — OFFICE VISIT (OUTPATIENT)
Dept: INTERNAL MEDICINE CLINIC | Age: 54
End: 2019-10-15
Payer: MEDICARE

## 2019-10-15 VITALS
DIASTOLIC BLOOD PRESSURE: 60 MMHG | SYSTOLIC BLOOD PRESSURE: 122 MMHG | HEART RATE: 75 BPM | BODY MASS INDEX: 33.18 KG/M2 | HEIGHT: 69 IN | OXYGEN SATURATION: 98 % | WEIGHT: 224 LBS

## 2019-10-15 DIAGNOSIS — J30.89 NON-SEASONAL ALLERGIC RHINITIS DUE TO OTHER ALLERGIC TRIGGER: Primary | ICD-10-CM

## 2019-10-15 PROCEDURE — 3017F COLORECTAL CA SCREEN DOC REV: CPT | Performed by: INTERNAL MEDICINE

## 2019-10-15 PROCEDURE — G8417 CALC BMI ABV UP PARAM F/U: HCPCS | Performed by: INTERNAL MEDICINE

## 2019-10-15 PROCEDURE — 99213 OFFICE O/P EST LOW 20 MIN: CPT | Performed by: INTERNAL MEDICINE

## 2019-10-15 PROCEDURE — G8484 FLU IMMUNIZE NO ADMIN: HCPCS | Performed by: INTERNAL MEDICINE

## 2019-10-15 PROCEDURE — 1036F TOBACCO NON-USER: CPT | Performed by: INTERNAL MEDICINE

## 2019-10-15 PROCEDURE — G8427 DOCREV CUR MEDS BY ELIG CLIN: HCPCS | Performed by: INTERNAL MEDICINE

## 2019-10-15 ASSESSMENT — ENCOUNTER SYMPTOMS
SHORTNESS OF BREATH: 0
COUGH: 0
CHEST TIGHTNESS: 0
NAUSEA: 0
BACK PAIN: 0
ABDOMINAL PAIN: 0
EYE REDNESS: 0

## 2019-11-18 ENCOUNTER — TELEPHONE (OUTPATIENT)
Dept: INTERNAL MEDICINE CLINIC | Age: 54
End: 2019-11-18

## 2019-12-10 ENCOUNTER — TELEPHONE (OUTPATIENT)
Dept: INTERNAL MEDICINE CLINIC | Age: 54
End: 2019-12-10

## 2019-12-10 DIAGNOSIS — M16.12 PRIMARY OSTEOARTHRITIS OF LEFT HIP: Primary | ICD-10-CM

## 2020-01-29 ENCOUNTER — TELEPHONE (OUTPATIENT)
Dept: INTERNAL MEDICINE CLINIC | Age: 55
End: 2020-01-29

## 2020-02-07 ENCOUNTER — OFFICE VISIT (OUTPATIENT)
Dept: INTERNAL MEDICINE CLINIC | Age: 55
End: 2020-02-07
Payer: MEDICARE

## 2020-02-07 VITALS
BODY MASS INDEX: 36.43 KG/M2 | RESPIRATION RATE: 16 BRPM | SYSTOLIC BLOOD PRESSURE: 112 MMHG | OXYGEN SATURATION: 99 % | DIASTOLIC BLOOD PRESSURE: 70 MMHG | HEIGHT: 69 IN | HEART RATE: 84 BPM | WEIGHT: 246 LBS

## 2020-02-07 LAB
BILIRUBIN, POC: ABNORMAL
BLOOD URINE, POC: ABNORMAL
CLARITY, POC: ABNORMAL
COLOR, POC: ABNORMAL
GLUCOSE URINE, POC: ABNORMAL
KETONES, POC: ABNORMAL
LEUKOCYTE EST, POC: ABNORMAL
NITRITE, POC: ABNORMAL
PH, POC: 6
PROTEIN, POC: ABNORMAL
SPECIFIC GRAVITY, POC: 1.02
UROBILINOGEN, POC: 0.2

## 2020-02-07 PROCEDURE — G8427 DOCREV CUR MEDS BY ELIG CLIN: HCPCS | Performed by: INTERNAL MEDICINE

## 2020-02-07 PROCEDURE — 1036F TOBACCO NON-USER: CPT | Performed by: INTERNAL MEDICINE

## 2020-02-07 PROCEDURE — 99213 OFFICE O/P EST LOW 20 MIN: CPT | Performed by: INTERNAL MEDICINE

## 2020-02-07 PROCEDURE — 3017F COLORECTAL CA SCREEN DOC REV: CPT | Performed by: INTERNAL MEDICINE

## 2020-02-07 PROCEDURE — G8417 CALC BMI ABV UP PARAM F/U: HCPCS | Performed by: INTERNAL MEDICINE

## 2020-02-07 PROCEDURE — 81002 URINALYSIS NONAUTO W/O SCOPE: CPT | Performed by: INTERNAL MEDICINE

## 2020-02-07 PROCEDURE — G8484 FLU IMMUNIZE NO ADMIN: HCPCS | Performed by: INTERNAL MEDICINE

## 2020-02-07 ASSESSMENT — PATIENT HEALTH QUESTIONNAIRE - PHQ9
SUM OF ALL RESPONSES TO PHQ9 QUESTIONS 1 & 2: 0
1. LITTLE INTEREST OR PLEASURE IN DOING THINGS: 0
SUM OF ALL RESPONSES TO PHQ QUESTIONS 1-9: 0
SUM OF ALL RESPONSES TO PHQ QUESTIONS 1-9: 0
2. FEELING DOWN, DEPRESSED OR HOPELESS: 0

## 2020-02-07 NOTE — PROGRESS NOTES
Subjective:      Patient ID: Kaylee Garcia is a 62 y.o. female    Chief Complaint   Patient presents with    Diarrhea     resolved, skin lesion left chest       Diarrhea    This is a new problem. The current episode started in the past 7 days. The problem has been resolved. Pertinent negatives include no abdominal pain, arthralgias, chills, coughing, fever or headaches. Nothing aggravates the symptoms. There are no known risk factors. She has tried nothing for the symptoms. The treatment provided significant relief. Skin lesion  1.5 to 2 cm brownish raised superficial skin lesion on the left chest.  No bleeding. No pain. No rapid growth. Current Outpatient Medications on File Prior to Visit   Medication Sig Dispense Refill    loratadine (CLARITIN) 10 MG tablet TAKE ONE TABLET BY MOUTH ONCE A DAY. 30 tablet 0    sertraline (ZOLOFT) 100 MG tablet TAKE ONE TABLET BY MOUTH ONCE A DAY. 30 tablet 0    sertraline (ZOLOFT) 50 MG tablet TAKE ONE TABLET BY MOUTH ONCE A DAY. 30 tablet 0    clomiPRAMINE (ANAFRANIL) 50 MG capsule TAKE TWO (2) CAPSULES BY MOUTH AT BEDTIME. 60 capsule 0    BANOPHEN 25 MG capsule TAKE ONE (1) CAPSULE BY MOUTH AT BEDTIME. 30 capsule 0    perphenazine 2 MG tablet TAKE 1 TABLET BY MOUTH AT BEDTIME 30 tablet 0    diclofenac (VOLTAREN) 75 MG EC tablet TAKE 1 TABLET BY MOUTH TWICE DAILY. 60 tablet 0    propranolol (INDERAL) 40 MG tablet TAKE ONE TABLET BY MOUTH THREE TIMES DAILY. 90 tablet 0    ACETAMINOPHEN EXTRA STRENGTH 500 MG tablet TAKE TWO (2) TABLETS BY MOUTH THREE (3) TIMES DAILY 180 tablet 0    propranolol (INDERAL) 20 MG tablet TAKE 2 TABLETS (40MG) BY MOUTH THREE TIMES A  tablet 4    acetaminophen (TYLENOL) 500 MG tablet Take 2 tablets by mouth 3 times daily 360 tablet 3    acetaminophen (APAP EXTRA STRENGTH) 500 MG tablet Take 2 tablets by mouth 3 times daily 270 tablet 5    EPINEPHrine (EPIPEN 2-JAG) 0.3 MG/0.3ML SOAJ injection USE AS DRECTED.  TAKE WITH ON ALL OUTINGS. 2 each 5    Diapers & Supplies MISC USE 1 6 TIMES A  each 3    Calcium-Vitamin D-Vitamin K (VIACTIV) 193-100-74 MG-UNT-MCG CHEW Take 1 tablet by mouth 2 times daily 60 tablet 5    Disposable Gloves (VINYL GLOVES MEDIUM) MISC FACILITY REQUESTING ORDER FOR EXAM GLOVES 100 each 5    EPIPEN 2-JAG 0.3 MG/0.3ML SOAJ injection USE AS DRECTED. TAKE WITH ON ALL OUTINGS. 2 each 3    Clotrimazole 1 % OINT Apply 1 each topically 2 times daily as needed (rash) 60 g 2    BANOPHEN 25 MG tablet TAKE 1 TABLET BY MOUTH NIGHTLY (DIPHENHYDRAMINE) 30 tablet 2    triamcinolone (KENALOG) 0.1 % cream Apply topically 2 times daily. 30 g 1    polyethylene glycol (GLYCOLAX) powder Take 17 g by mouth nightly.  EPINEPHrine (EPIPEN 2-JAG IJ) Inject  as directed. No current facility-administered medications on file prior to visit. Allergies   Allergen Reactions    Bee Venom Anaphylaxis    Amoxil [Amoxicillin]     Bactrim [Sulfamethoxazole-Trimethoprim]     Codeine     Strawberry Flavor Rash       Review of Systems   Constitutional: Negative for chills, fatigue, fever and unexpected weight change. HENT: Negative for congestion and hearing loss. Eyes: Negative for redness and visual disturbance. Respiratory: Negative for cough, chest tightness and shortness of breath. Cardiovascular: Negative for chest pain and leg swelling. Gastrointestinal: Positive for diarrhea. Negative for abdominal pain and nausea. Endocrine: Negative for polydipsia and polyuria. Genitourinary: Negative for dysuria and frequency. Musculoskeletal: Negative for arthralgias, back pain and neck pain. Skin: Negative for rash and wound. Neurological: Negative for dizziness, weakness and headaches. Hematological: Negative for adenopathy. Does not bruise/bleed easily. Psychiatric/Behavioral: Negative for sleep disturbance. The patient is not nervous/anxious.         Objective:   Physical Exam  Constitutional:

## 2020-02-10 LAB
ORGANISM: ABNORMAL
URINE CULTURE, ROUTINE: ABNORMAL

## 2020-02-12 RX ORDER — LEVOFLOXACIN 250 MG/1
250 TABLET ORAL DAILY
Qty: 7 TABLET | Refills: 0 | Status: SHIPPED | OUTPATIENT
Start: 2020-02-12 | End: 2020-02-19

## 2020-02-23 ASSESSMENT — ENCOUNTER SYMPTOMS
CHEST TIGHTNESS: 0
BACK PAIN: 0
COUGH: 0
DIARRHEA: 1
EYE REDNESS: 0
ABDOMINAL PAIN: 0
NAUSEA: 0
SHORTNESS OF BREATH: 0

## 2020-03-13 ENCOUNTER — OFFICE VISIT (OUTPATIENT)
Dept: INTERNAL MEDICINE CLINIC | Age: 55
End: 2020-03-13
Payer: MEDICARE

## 2020-03-13 VITALS
BODY MASS INDEX: 34.63 KG/M2 | HEIGHT: 69 IN | WEIGHT: 233.8 LBS | HEART RATE: 111 BPM | SYSTOLIC BLOOD PRESSURE: 98 MMHG | DIASTOLIC BLOOD PRESSURE: 60 MMHG | OXYGEN SATURATION: 98 %

## 2020-03-13 PROCEDURE — G0439 PPPS, SUBSEQ VISIT: HCPCS | Performed by: INTERNAL MEDICINE

## 2020-03-13 PROCEDURE — 3017F COLORECTAL CA SCREEN DOC REV: CPT | Performed by: INTERNAL MEDICINE

## 2020-03-13 PROCEDURE — G8484 FLU IMMUNIZE NO ADMIN: HCPCS | Performed by: INTERNAL MEDICINE

## 2020-03-13 ASSESSMENT — PATIENT HEALTH QUESTIONNAIRE - PHQ9
SUM OF ALL RESPONSES TO PHQ QUESTIONS 1-9: 0
SUM OF ALL RESPONSES TO PHQ QUESTIONS 1-9: 0

## 2020-03-13 ASSESSMENT — LIFESTYLE VARIABLES: HOW OFTEN DO YOU HAVE A DRINK CONTAINING ALCOHOL: 0

## 2020-03-13 NOTE — PROGRESS NOTES
Disposable Gloves (VINYL GLOVES MEDIUM) MISC FACILITY REQUESTING ORDER FOR EXAM GLOVES Yes Joe Rodriguez MD   Clotrimazole 1 % OINT Apply 1 each topically 2 times daily as needed (rash) Yes Joe Rodriguez MD   triamcinolone (KENALOG) 0.1 % cream Apply topically 2 times daily. Yes Joe Rodriguez MD   ACETAMINOPHEN EXTRA STRENGTH 500 MG tablet TAKE TWO (2) TABLETS BY MOUTH THREE (3) TIMES DAILY  Joe Rodriguez MD   acetaminophen (TYLENOL) 500 MG tablet Take 2 tablets by mouth 3 times daily  Alexandru Moscoso MD   EPINEPHrine (EPIPEN 2-JAG) 0.3 MG/0.3ML SOAJ injection USE AS DRECTED. TAKE WITH ON ALL OUTINGS. Joe Rodriguez MD   EPIPEN 2-JAG 0.3 MG/0.3ML SOAJ injection USE AS DRECTED. TAKE WITH ON ALL OUTINGS. Joe Rodriguez MD   BANOPHEN 25 MG tablet TAKE 1 TABLET BY MOUTH NIGHTLY (DIPHENHYDRAMINE)  Joe Rodriguez MD   polyethylene glycol (GLYCOLAX) powder Take 17 g by mouth nightly. Historical Provider, MD   EPINEPHrine (EPIPEN 2-JAG IJ) Inject  as directed. Historical Provider, MD         Past Medical History:   Diagnosis Date    Autism     Mental retardation     OCD (obsessive compulsive disorder)        Past Surgical History:   Procedure Laterality Date    HYSTERECTOMY  1975         Family History   Problem Relation Age of Onset    Colon Cancer Paternal Grandfather        CareTeam (Including outside providers/suppliers regularly involved in providing care):   Patient Care Team:  Joe Rodriguez MD as PCP - General  Joe Rodriguez MD as PCP - [facility] Empaneled Provider  Lynn Frias MD as Consulting Physician  Dejah Guy MD as Consulting Physician (Ophthalmology)    Wt Readings from Last 3 Encounters:   03/13/20 233 lb 12.8 oz (106.1 kg)   02/07/20 246 lb (111.6 kg)   10/15/19 224 lb (101.6 kg)     Vitals:    03/13/20 1341   BP: 98/60   Pulse: 111   SpO2: 98%   Weight: 233 lb 12.8 oz (106.1 kg)   Height: 5' 9\" (1.753 m)     Body mass index is 34.53 kg/m².     Based upon direct observation of the patient, evaluation of cognition reveals Wellness Visit (AWV)  05/29/2019    A1C test (Diabetic or Prediabetic)  02/13/2020    Colon Cancer Screen FIT/FOBT  02/19/2020    Shingles Vaccine (1 of 2) 08/14/2020 (Originally 11/25/2013)    Breast cancer screen  03/22/2021    DTaP/Tdap/Td vaccine (2 - Td) 07/11/2023    Lipid screen  02/13/2024    Flu vaccine  Completed    Hepatitis A vaccine  Aged Out    Hepatitis B vaccine  Aged Out    Hib vaccine  Aged Out    Meningococcal (ACWY) vaccine  Aged Out    Pneumococcal 0-64 years Vaccine  Aged Out     Recommendations for [facility] Due: see orders and patient instructions/AVS.  Recommended screening schedule for the next 5-10 years is provided to the patient in written form: see Patient Instructions/AVS.    Tasha Beckman was seen today for medicare awv. Diagnoses and all orders for this visit:    Well adult exam    Mixed hyperlipidemia  -     Comprehensive Metabolic Panel; Future  -     Lipid Panel; Future    Chronic hip pain, left    Autism    Obesity (BMI 35.0-39.9 without comorbidity)    Screening for HIV (human immunodeficiency virus)  -     HIV-1 AND HIV-2 ANTIBODIES; Future    Need for hepatitis C screening test  -     HEPATITIS C ANTIBODY; Future    Other fatigue  -     CBC Auto Differential; Future  -     TSH with Reflex; Future    Hyperglycemia  -     Hemoglobin A1C; Future    Routine general medical examination at a health care facility        1. Well adult exam  Stable. See orders. 2. Mixed hyperlipidemia  Stable. Check labs. - Comprehensive Metabolic Panel; Future  - Lipid Panel; Future    3. Chronic hip pain, left  Stable. Continue supportive care. She does go to the swimming pool and is enjoying exercise in the pool regularly. 4. Autism  Stable. Continue current medicine. 5. Obesity (BMI 35.0-39.9 without comorbidity)  Stable. Continue on healthy diet. 6. Screening for HIV (human immunodeficiency virus)  Screening.  - HIV-1 AND HIV-2 ANTIBODIES; Future    7.  Need for hepatitis C screening test  Screening.  - HEPATITIS C ANTIBODY; Future    8. Other fatigue  Stable. Check labs. - CBC Auto Differential; Future  - TSH with Reflex; Future    9. Hyperglycemia  Screening.  - Hemoglobin A1C; Future    10. Routine general medical examination at a health care facility  Stable. Check labs.

## 2020-03-13 NOTE — PATIENT INSTRUCTIONS
Personalized Preventive Plan for Reji Rand - 3/13/2020  Medicare offers a range of preventive health benefits. Some of the tests and screenings are paid in full while other may be subject to a deductible, co-insurance, and/or copay. Some of these benefits include a comprehensive review of your medical history including lifestyle, illnesses that may run in your family, and various assessments and screenings as appropriate. After reviewing your medical record and screening and assessments performed today your provider may have ordered immunizations, labs, imaging, and/or referrals for you. A list of these orders (if applicable) as well as your Preventive Care list are included within your After Visit Summary for your review. Other Preventive Recommendations:    · A preventive eye exam performed by an eye specialist is recommended every 1-2 years to screen for glaucoma; cataracts, macular degeneration, and other eye disorders. · A preventive dental visit is recommended every 6 months. · Try to get at least 150 minutes of exercise per week or 10,000 steps per day on a pedometer . · Order or download the FREE \"Exercise & Physical Activity: Your Everyday Guide\" from [employer]. Call 5-606.614.2976 or search [employer] online. · You need 9304-7837 mg of calcium and 2863-4307 IU of vitamin D per day. It is possible to meet your calcium requirement with diet alone, but a vitamin D supplement is usually necessary to meet this goal.  · When exposed to the sun, use a sunscreen that protects against both UVA and UVB radiation with an SPF of 30 or greater. Reapply every 2 to 3 hours or after sweating, drying off with a towel, or swimming. · Always wear a seat belt when traveling in a car. Always wear a helmet when riding a bicycle or motorcycle.

## 2020-03-31 ENCOUNTER — NURSE TRIAGE (OUTPATIENT)
Dept: CARDIOLOGY CLINIC | Age: 55
End: 2020-03-31

## 2020-03-31 NOTE — TELEPHONE ENCOUNTER
Reason for Disposition   [1] COVID-19 EXPOSURE (Close Contact) AND [2] 15 or more days ago AND [3] NO cough or fever or breathing difficulty    Answer Assessment - Initial Assessment Questions  1. CONFIRMED CASE: \"Who is the person with the confirmed COVID-19 infection that you were exposed to? \"      unknown  2. PLACE of CONTACT: \"Where were you when you were exposed to COVID-19  (coronavirus disease 2019)? \" (e.g., city, state, country)      unknown  3. TYPE of CONTACT: \"How much contact was there? \" (e.g., live in same house, work in same office, same school)      unknown  4. DATE of CONTACT: \"When did you have contact with a coronavirus patient? \" (e.g., days)      unknown  5. DURATION of CONTACT: \"How long were you in contact with the COVID-19 (coronavirus disease) patient? \" (e.g., a few seconds, passed by person, a few minutes, live with the patient)      unknown  6. SYMPTOMS: \"Do you have any symptoms? \" (e.g., fever, cough, breathing difficulty)      Cough no fever    Protocols used: CORONAVIRUS (COVID-19) EXPOSURE-ADULT-

## 2020-04-02 ENCOUNTER — TELEPHONE (OUTPATIENT)
Dept: INTERNAL MEDICINE CLINIC | Age: 55
End: 2020-04-02

## 2020-04-02 RX ORDER — CALCIUM CARB/VITAMIN D3/VIT K1 650MG-12.5
1 TABLET,CHEWABLE ORAL DAILY
Qty: 30 TABLET | Refills: 11 | Status: SHIPPED | OUTPATIENT
Start: 2020-04-02 | End: 2022-03-17 | Stop reason: SDUPTHER

## 2020-04-02 RX ORDER — BENZONATATE 200 MG/1
200 CAPSULE ORAL 3 TIMES DAILY PRN
Qty: 30 CAPSULE | Refills: 0 | Status: SHIPPED | OUTPATIENT
Start: 2020-04-02 | End: 2020-04-17 | Stop reason: SDUPTHER

## 2020-04-02 NOTE — TELEPHONE ENCOUNTER
Julisa kilgore from [facility] called stating that patient has a cough but will not take liquid medication. Can call her in a coricidin HBP in? Also, can you start her on the Viactiv Calcium - chewables ? Please call to advise.        Hipolito Roper, [address] [place] [address], [facility] [address] [phone]

## 2020-04-03 ENCOUNTER — OFFICE VISIT (OUTPATIENT)
Dept: PRIMARY CARE CLINIC | Age: 55
End: 2020-04-03
Payer: MEDICARE

## 2020-04-03 ENCOUNTER — TELEPHONE (OUTPATIENT)
Dept: INTERNAL MEDICINE CLINIC | Age: 55
End: 2020-04-03

## 2020-04-03 VITALS — TEMPERATURE: 97.3 F | OXYGEN SATURATION: 95 % | HEART RATE: 91 BPM

## 2020-04-03 PROCEDURE — 1036F TOBACCO NON-USER: CPT | Performed by: NURSE PRACTITIONER

## 2020-04-03 PROCEDURE — 3017F COLORECTAL CA SCREEN DOC REV: CPT | Performed by: NURSE PRACTITIONER

## 2020-04-03 PROCEDURE — 99213 OFFICE O/P EST LOW 20 MIN: CPT | Performed by: NURSE PRACTITIONER

## 2020-04-03 PROCEDURE — G8428 CUR MEDS NOT DOCUMENT: HCPCS | Performed by: NURSE PRACTITIONER

## 2020-04-03 PROCEDURE — G8417 CALC BMI ABV UP PARAM F/U: HCPCS | Performed by: NURSE PRACTITIONER

## 2020-04-03 NOTE — TELEPHONE ENCOUNTER
The house managers calling with concern to get others tested as they are having symptoms cough, fever and have been exposed to confirmed patient Bruce Gorman, please advise

## 2020-04-10 LAB
SARS-COV-2: DETECTED
SOURCE: ABNORMAL

## 2020-04-17 ENCOUNTER — TELEPHONE (OUTPATIENT)
Dept: INTERNAL MEDICINE CLINIC | Age: 55
End: 2020-04-17

## 2020-04-17 RX ORDER — BENZONATATE 200 MG/1
200 CAPSULE ORAL 3 TIMES DAILY PRN
Qty: 30 CAPSULE | Refills: 2 | Status: SHIPPED | OUTPATIENT
Start: 2020-04-17 | End: 2020-04-27

## 2020-04-17 NOTE — TELEPHONE ENCOUNTER
Home manager states cough was getting better but pt has no more of the cough tablets. Home manager states pt is starting to cough again. Home manager is requesting a refill on cough tablets.

## 2020-05-11 ENCOUNTER — TELEPHONE (OUTPATIENT)
Dept: INTERNAL MEDICINE CLINIC | Age: 55
End: 2020-05-11

## 2020-05-12 RX ORDER — LOPERAMIDE HYDROCHLORIDE 2 MG/1
2 CAPSULE ORAL 2 TIMES DAILY PRN
Qty: 20 CAPSULE | Refills: 1 | Status: SHIPPED | OUTPATIENT
Start: 2020-05-12 | End: 2022-03-17 | Stop reason: SDUPTHER

## 2020-08-12 ENCOUNTER — TELEPHONE (OUTPATIENT)
Dept: INTERNAL MEDICINE CLINIC | Age: 55
End: 2020-08-12

## 2020-08-12 RX ORDER — CLOTRIMAZOLE 1 %
CREAM (GRAM) TOPICAL
Qty: 14 G | Refills: 0 | Status: SHIPPED | OUTPATIENT
Start: 2020-08-12 | End: 2020-08-19

## 2020-08-12 NOTE — TELEPHONE ENCOUNTER
Samantha Howard the patients home care nurse has seen a spot on her right thigh, a little larger than a quarter. Samantha Howard is not sure if its impetigo or ring worm. Patient hasn't bothered with the spot at all. Samantha Howard is wondering if something can be given to the patient. If you need to see the spot on the patient close up please call [address]  Manager [phone]. [address], [address] [phone]     Please call and advise.

## 2020-08-12 NOTE — TELEPHONE ENCOUNTER
Medicine sent to [facility] long-term-care pharmacy. Orders Placed This Encounter   Medications    clotrimazole (LOTRIMIN AF) 1 % cream     Sig: Apply topically 2 times daily for 3 weeks.      Dispense:  14 g     Refill:  0

## 2020-08-13 ENCOUNTER — OFFICE VISIT (OUTPATIENT)
Dept: INTERNAL MEDICINE CLINIC | Age: 55
End: 2020-08-13
Payer: MEDICARE

## 2020-08-13 VITALS
DIASTOLIC BLOOD PRESSURE: 80 MMHG | TEMPERATURE: 97.5 F | BODY MASS INDEX: 33.77 KG/M2 | WEIGHT: 228 LBS | SYSTOLIC BLOOD PRESSURE: 122 MMHG | HEIGHT: 69 IN

## 2020-08-13 PROCEDURE — G8417 CALC BMI ABV UP PARAM F/U: HCPCS | Performed by: INTERNAL MEDICINE

## 2020-08-13 PROCEDURE — 3017F COLORECTAL CA SCREEN DOC REV: CPT | Performed by: INTERNAL MEDICINE

## 2020-08-13 PROCEDURE — 99213 OFFICE O/P EST LOW 20 MIN: CPT | Performed by: INTERNAL MEDICINE

## 2020-08-13 PROCEDURE — G8427 DOCREV CUR MEDS BY ELIG CLIN: HCPCS | Performed by: INTERNAL MEDICINE

## 2020-08-13 PROCEDURE — 1036F TOBACCO NON-USER: CPT | Performed by: INTERNAL MEDICINE

## 2020-08-13 SDOH — ECONOMIC STABILITY: TRANSPORTATION INSECURITY
IN THE PAST 12 MONTHS, HAS LACK OF TRANSPORTATION KEPT YOU FROM MEETINGS, WORK, OR FROM GETTING THINGS NEEDED FOR DAILY LIVING?: NO

## 2020-08-13 SDOH — ECONOMIC STABILITY: FOOD INSECURITY: WITHIN THE PAST 12 MONTHS, THE FOOD YOU BOUGHT JUST DIDN'T LAST AND YOU DIDN'T HAVE MONEY TO GET MORE.: NEVER TRUE

## 2020-08-13 SDOH — ECONOMIC STABILITY: INCOME INSECURITY: HOW HARD IS IT FOR YOU TO PAY FOR THE VERY BASICS LIKE FOOD, HOUSING, MEDICAL CARE, AND HEATING?: NOT HARD AT ALL

## 2020-08-13 SDOH — ECONOMIC STABILITY: FOOD INSECURITY: WITHIN THE PAST 12 MONTHS, YOU WORRIED THAT YOUR FOOD WOULD RUN OUT BEFORE YOU GOT MONEY TO BUY MORE.: NEVER TRUE

## 2020-08-13 SDOH — ECONOMIC STABILITY: TRANSPORTATION INSECURITY
IN THE PAST 12 MONTHS, HAS THE LACK OF TRANSPORTATION KEPT YOU FROM MEDICAL APPOINTMENTS OR FROM GETTING MEDICATIONS?: NO

## 2020-08-13 ASSESSMENT — ENCOUNTER SYMPTOMS
EYE REDNESS: 0
ABDOMINAL PAIN: 0
CHEST TIGHTNESS: 0
BACK PAIN: 0
SHORTNESS OF BREATH: 0
NAUSEA: 0
COUGH: 0

## 2020-08-13 NOTE — PROGRESS NOTES
times daily 360 tablet 3    acetaminophen (APAP EXTRA STRENGTH) 500 MG tablet Take 2 tablets by mouth 3 times daily 270 tablet 5    EPINEPHrine (EPIPEN 2-JAG) 0.3 MG/0.3ML SOAJ injection USE AS DRECTED. TAKE WITH ON ALL OUTINGS. 2 each 5    Diapers & Supplies MISC USE 1 6 TIMES A  each 3    Calcium-Vitamin D-Vitamin K (VIACTIV) [phone] MG-UNT-MCG CHEW Take 1 tablet by mouth 2 times daily 60 tablet 5    Disposable Gloves (VINYL GLOVES MEDIUM) MISC FACILITY REQUESTING ORDER FOR EXAM GLOVES 100 each 5    EPIPEN 2-JAG 0.3 MG/0.3ML SOAJ injection USE AS DRECTED. TAKE WITH ON ALL OUTINGS. 2 each 3    Clotrimazole 1 % OINT Apply 1 each topically 2 times daily as needed (rash) 60 g 2    BANOPHEN 25 MG tablet TAKE 1 TABLET BY MOUTH NIGHTLY (DIPHENHYDRAMINE) 30 tablet 2    triamcinolone (KENALOG) 0.1 % cream Apply topically 2 times daily. 30 g 1    polyethylene glycol (GLYCOLAX) powder Take 17 g by mouth nightly.  EPINEPHrine (EPIPEN 2-JAG IJ) Inject  as directed. No current facility-administered medications on file prior to visit. Allergies   Allergen Reactions    Bee Venom Anaphylaxis    Amoxil [Amoxicillin]     Bactrim [Sulfamethoxazole-Trimethoprim]     Codeine     Strawberry Flavor Rash       Review of Systems   Constitutional: Negative for fatigue, fever and unexpected weight change. HENT: Negative for congestion and hearing loss. Eyes: Negative for redness and visual disturbance. Respiratory: Negative for cough, chest tightness and shortness of breath. Cardiovascular: Negative for chest pain and leg swelling. Gastrointestinal: Negative for abdominal pain and nausea. Genitourinary: Negative for dysuria and frequency. Musculoskeletal: Negative for arthralgias, back pain and neck pain. Skin: Negative for rash and wound. Neurological: Negative for dizziness, weakness and headaches. Hematological: Negative for adenopathy. Does not bruise/bleed easily. Psychiatric/Behavioral: Positive for confusion. Negative for agitation and sleep disturbance. The patient is nervous/anxious. Objective:   Physical Exam  Constitutional:       General: She is not in acute distress. Appearance: She is obese. Cardiovascular:      Rate and Rhythm: Normal rate. Pulmonary:      Effort: Pulmonary effort is normal.      Breath sounds: No wheezing or rales. Skin:     Comments: 3 cm circular red area right thigh with advancing bright red border, central lighter red area with scaling skin    Neurological:      Comments: Mental retardation          Assessment and plan       1. Tinea corporis  New tinea corporis. Right upper leg. Start on Chlortrimazole cream twice daily x3 weeks.

## 2020-08-20 ENCOUNTER — TELEPHONE (OUTPATIENT)
Dept: INTERNAL MEDICINE CLINIC | Age: 55
End: 2020-08-20

## 2020-08-20 RX ORDER — EPINEPHRINE 0.3 MG/.3ML
INJECTION SUBCUTANEOUS
Qty: 2 EACH | Refills: 5 | Status: SHIPPED | OUTPATIENT
Start: 2020-08-20 | End: 2021-07-14

## 2020-08-25 ENCOUNTER — OFFICE VISIT (OUTPATIENT)
Dept: PRIMARY CARE CLINIC | Age: 55
End: 2020-08-25
Payer: MEDICARE

## 2020-08-25 PROCEDURE — G8417 CALC BMI ABV UP PARAM F/U: HCPCS | Performed by: NURSE PRACTITIONER

## 2020-08-25 PROCEDURE — 99211 OFF/OP EST MAY X REQ PHY/QHP: CPT | Performed by: NURSE PRACTITIONER

## 2020-08-25 PROCEDURE — G8428 CUR MEDS NOT DOCUMENT: HCPCS | Performed by: NURSE PRACTITIONER

## 2020-08-25 NOTE — PROGRESS NOTES
Loren Chapa received a viral test for COVID-19. They were educated on isolation and quarantine as appropriate. For any symptoms, they were directed to seek care from their PCP, given contact information to establish with a doctor, directed to an urgent care or the emergency room.

## 2020-08-26 LAB — SARS-COV-2, NAA: NOT DETECTED

## 2020-08-31 ENCOUNTER — TELEPHONE (OUTPATIENT)
Dept: INTERNAL MEDICINE CLINIC | Age: 55
End: 2020-08-31

## 2020-09-21 ENCOUNTER — TELEPHONE (OUTPATIENT)
Dept: INTERNAL MEDICINE CLINIC | Age: 55
End: 2020-09-21

## 2020-09-21 NOTE — TELEPHONE ENCOUNTER
Patient's [facility] is request a referral letter and prove that the patient is Covid negative. [facility] for the patient to receive physical therapy on going. No ended date.        Fax number [phone]  Phone number [phone]

## 2020-09-23 NOTE — TELEPHONE ENCOUNTER
Patient's Yunior Jonh is calling again about the referral she has requested to be sent [facility] with a copy of the lab showing neg for Jesús Claros, fx #[phone]

## 2020-10-05 ENCOUNTER — TELEPHONE (OUTPATIENT)
Dept: INTERNAL MEDICINE CLINIC | Age: 55
End: 2020-10-05

## 2020-10-08 ENCOUNTER — TELEPHONE (OUTPATIENT)
Dept: INTERNAL MEDICINE CLINIC | Age: 55
End: 2020-10-08

## 2020-10-08 NOTE — TELEPHONE ENCOUNTER
Please add in CMN Orders for [facility] for this patient. [address] [phone]     Please advise.

## 2021-01-19 ENCOUNTER — TELEPHONE (OUTPATIENT)
Dept: INTERNAL MEDICINE CLINIC | Age: 56
End: 2021-01-19

## 2021-01-19 NOTE — TELEPHONE ENCOUNTER
Cee Gerardo,  called stating patient is scheduled to get the COVID 19 vaccine tomorrow and needs to know if Dr. Aung Summers is ok with this? Their only concern is that she has an Epi Pen and a lot of allergies. If you get her voicemail , please leave a message.     Please call to advise

## 2021-02-22 ENCOUNTER — OFFICE VISIT (OUTPATIENT)
Dept: INTERNAL MEDICINE CLINIC | Age: 56
End: 2021-02-22
Payer: MEDICARE

## 2021-02-22 VITALS
BODY MASS INDEX: 32.19 KG/M2 | WEIGHT: 217.3 LBS | DIASTOLIC BLOOD PRESSURE: 86 MMHG | TEMPERATURE: 96.8 F | RESPIRATION RATE: 16 BRPM | OXYGEN SATURATION: 100 % | HEART RATE: 91 BPM | SYSTOLIC BLOOD PRESSURE: 126 MMHG | HEIGHT: 69 IN

## 2021-02-22 DIAGNOSIS — N30.01 ACUTE CYSTITIS WITH HEMATURIA: Primary | ICD-10-CM

## 2021-02-22 PROCEDURE — 3017F COLORECTAL CA SCREEN DOC REV: CPT | Performed by: INTERNAL MEDICINE

## 2021-02-22 PROCEDURE — G8427 DOCREV CUR MEDS BY ELIG CLIN: HCPCS | Performed by: INTERNAL MEDICINE

## 2021-02-22 PROCEDURE — 1036F TOBACCO NON-USER: CPT | Performed by: INTERNAL MEDICINE

## 2021-02-22 PROCEDURE — 99213 OFFICE O/P EST LOW 20 MIN: CPT | Performed by: INTERNAL MEDICINE

## 2021-02-22 PROCEDURE — G8417 CALC BMI ABV UP PARAM F/U: HCPCS | Performed by: INTERNAL MEDICINE

## 2021-02-22 PROCEDURE — G8484 FLU IMMUNIZE NO ADMIN: HCPCS | Performed by: INTERNAL MEDICINE

## 2021-02-22 RX ORDER — NITROFURANTOIN 25; 75 MG/1; MG/1
100 CAPSULE ORAL 2 TIMES DAILY
Qty: 20 CAPSULE | Refills: 0 | Status: SHIPPED | OUTPATIENT
Start: 2021-02-22 | End: 2021-03-04

## 2021-02-22 ASSESSMENT — ENCOUNTER SYMPTOMS
NAUSEA: 0
COUGH: 0
BACK PAIN: 0
CHEST TIGHTNESS: 0
VOMITING: 0
EYE REDNESS: 0
SHORTNESS OF BREATH: 0
ABDOMINAL PAIN: 0

## 2021-02-22 ASSESSMENT — PATIENT HEALTH QUESTIONNAIRE - PHQ9
SUM OF ALL RESPONSES TO PHQ QUESTIONS 1-9: 0
SUM OF ALL RESPONSES TO PHQ9 QUESTIONS 1 & 2: 0
1. LITTLE INTEREST OR PLEASURE IN DOING THINGS: 0
SUM OF ALL RESPONSES TO PHQ QUESTIONS 1-9: 0

## 2021-02-22 NOTE — PROGRESS NOTES
Subjective:      Patient ID: Sariah Locke is a 60 y.o. female    Chief Complaint   Patient presents with    Other     Blood in pull up     Mariposa Landry is here with her sister. Urinary Tract Infection   This is a new problem. The current episode started today. The problem occurs intermittently. The patient is experiencing no pain. There has been no fever. Associated symptoms include hematuria. Pertinent negatives include no chills, frequency, nausea, sweats, urgency or vomiting. prior UTI       Current Outpatient Medications on File Prior to Visit   Medication Sig Dispense Refill    ciclopirox (LOPROX) 0.77 % cream APPLY TO TOE WEB 2 TIMES A DAY 30 g 0    Disposable Gloves (VINYL GLOVES MEDIUM) MISC FACILITY REQUESTING ORDER FOR EXAM GLOVES 100 each 5    Diapers & Supplies MISC USE 1 6 TIMES A  each 3    Clotrimazole 1 % OINT Apply 1 each topically 2 times daily as needed (rash) 60 g 1    EPINEPHrine (EPIPEN 2-JAG) 0.3 MG/0.3ML SOAJ injection USE AS DRECTED. TAKE WITH ON ALL OUTINGS. 2 each 5    loperamide (RA ANTI-DIARRHEAL) 2 MG capsule Take 1 capsule by mouth 2 times daily as needed for Diarrhea 20 capsule 1    sertraline (ZOLOFT) 100 MG tablet TAKE ONE TABLET BY MOUTH ONCE A DAY. 30 tablet 11    clomiPRAMINE (ANAFRANIL) 50 MG capsule TAKE TWO (2) CAPSULES BY MOUTH AT BEDTIME. 60 capsule 11    diclofenac (VOLTAREN) 75 MG EC tablet TAKE 1 TABLET BY MOUTH TWICE DAILY. 60 tablet 11    propranolol (INDERAL) 40 MG tablet TAKE ONE TABLET BY MOUTH THREE TIMES DAILY. 90 tablet 11    perphenazine 2 MG tablet TAKE 1 TABLET BY MOUTH AT BEDTIME 30 tablet 11    ACETAMINOPHEN EXTRA STRENGTH 500 MG tablet TAKE TWO (2) TABLETS BY MOUTH THREE (3) TIMES DAILY 180 tablet 11    BANOPHEN 25 MG capsule TAKE ONE (1) CAPSULE BY MOUTH AT BEDTIME. 30 capsule 11    loratadine (CLARITIN) 10 MG tablet TAKE ONE TABLET BY MOUTH ONCE A DAY.  30 tablet 11    sertraline (ZOLOFT) 50 MG tablet TAKE ONE TABLET BY MOUTH ONCE A DAY. 30 tablet 11    Calcium-Vitamin D-Vitamin K (VIACTIV CALCIUM PLUS D) 650-12.5-40 MG-MCG-MCG CHEW Take 1 tablet by mouth daily 30 tablet 11    propranolol (INDERAL) 20 MG tablet TAKE 2 TABLETS (40MG) BY MOUTH THREE TIMES A  tablet 4    acetaminophen (TYLENOL) 500 MG tablet Take 2 tablets by mouth 3 times daily 360 tablet 3    acetaminophen (APAP EXTRA STRENGTH) 500 MG tablet Take 2 tablets by mouth 3 times daily 270 tablet 5    Calcium-Vitamin D-Vitamin K (VIACTIV) [phone] MG-UNT-MCG CHEW Take 1 tablet by mouth 2 times daily 60 tablet 5    EPIPEN 2-JAG 0.3 MG/0.3ML SOAJ injection USE AS DRECTED. TAKE WITH ON ALL OUTINGS. 2 each 3    BANOPHEN 25 MG tablet TAKE 1 TABLET BY MOUTH NIGHTLY (DIPHENHYDRAMINE) 30 tablet 2    triamcinolone (KENALOG) 0.1 % cream Apply topically 2 times daily. 30 g 1    polyethylene glycol (GLYCOLAX) powder Take 17 g by mouth nightly.  EPINEPHrine (EPIPEN 2-JAG IJ) Inject  as directed. No current facility-administered medications on file prior to visit. Allergies   Allergen Reactions    Bee Venom Anaphylaxis    Amoxil [Amoxicillin]     Bactrim [Sulfamethoxazole-Trimethoprim]     Codeine     Strawberry Flavor Rash       Review of Systems   Constitutional: Negative for chills, fatigue, fever and unexpected weight change. HENT: Negative for congestion and hearing loss. Eyes: Negative for redness and visual disturbance. Respiratory: Negative for cough, chest tightness and shortness of breath. Cardiovascular: Negative for chest pain and leg swelling. Gastrointestinal: Negative for abdominal pain, nausea and vomiting. Endocrine: Negative for cold intolerance, heat intolerance, polydipsia and polyuria. Genitourinary: Positive for hematuria. Negative for dysuria, frequency and urgency. Musculoskeletal: Negative for arthralgias, back pain and neck pain. Skin: Negative for rash and wound.    Neurological: Negative for dizziness, weakness and headaches. Hematological: Negative for adenopathy. Does not bruise/bleed easily. Psychiatric/Behavioral: Negative for sleep disturbance. The patient is not nervous/anxious. Objective:   Physical Exam  Constitutional:       Appearance: She is well-developed. She is obese. Cardiovascular:      Rate and Rhythm: Normal rate and regular rhythm. Heart sounds: No murmur. Pulmonary:      Effort: Pulmonary effort is normal.      Breath sounds: Normal breath sounds. No wheezing. Abdominal:      General: Abdomen is flat. Palpations: Abdomen is soft. Skin:     General: Skin is warm and dry. Findings: No rash. Assessment and plan       1. Acute cystitis with hematuria  She was unable to give a urine specimen. Start empiric antibiotic.   - nitrofurantoin, macrocrystal-monohydrate, (MACROBID) 100 MG capsule; Take 1 capsule by mouth 2 times daily for 10 days  Dispense: 20 capsule;  Refill: 0

## 2021-02-22 NOTE — PROGRESS NOTES
Pt present with sister and guardian Lorna Rosado. Got a call from care facility that stated pt had blood in pull up. Could not be period, pt had hysterectomy years ago, not sure if it is vaginal or anal. Nurse a facility stated it could be due to continuous therapy of antiinflammatory medication.

## 2021-04-01 ENCOUNTER — OFFICE VISIT (OUTPATIENT)
Dept: INTERNAL MEDICINE CLINIC | Age: 56
End: 2021-04-01
Payer: MEDICARE

## 2021-04-01 VITALS
HEART RATE: 82 BPM | WEIGHT: 214 LBS | HEIGHT: 69 IN | DIASTOLIC BLOOD PRESSURE: 80 MMHG | SYSTOLIC BLOOD PRESSURE: 120 MMHG | OXYGEN SATURATION: 98 % | BODY MASS INDEX: 31.7 KG/M2

## 2021-04-01 DIAGNOSIS — F42.2 MIXED OBSESSIONAL THOUGHTS AND ACTS: ICD-10-CM

## 2021-04-01 DIAGNOSIS — N95.0 POST-MENOPAUSAL BLEEDING: Primary | ICD-10-CM

## 2021-04-01 DIAGNOSIS — R53.83 OTHER FATIGUE: ICD-10-CM

## 2021-04-01 DIAGNOSIS — R73.9 HYPERGLYCEMIA: ICD-10-CM

## 2021-04-01 PROCEDURE — G8427 DOCREV CUR MEDS BY ELIG CLIN: HCPCS | Performed by: INTERNAL MEDICINE

## 2021-04-01 PROCEDURE — 1036F TOBACCO NON-USER: CPT | Performed by: INTERNAL MEDICINE

## 2021-04-01 PROCEDURE — 99213 OFFICE O/P EST LOW 20 MIN: CPT | Performed by: INTERNAL MEDICINE

## 2021-04-01 PROCEDURE — 3017F COLORECTAL CA SCREEN DOC REV: CPT | Performed by: INTERNAL MEDICINE

## 2021-04-01 PROCEDURE — G8417 CALC BMI ABV UP PARAM F/U: HCPCS | Performed by: INTERNAL MEDICINE

## 2021-04-01 ASSESSMENT — ENCOUNTER SYMPTOMS
NAUSEA: 0
EYE REDNESS: 0
BACK PAIN: 0
COUGH: 0
ABDOMINAL PAIN: 0
CHEST TIGHTNESS: 0
SHORTNESS OF BREATH: 0

## 2021-04-01 NOTE — PATIENT INSTRUCTIONS
Fernando Tan MD  Psychiatrist in [address]  Address: [address] [place] [address], [address]  Phone: [phone]

## 2021-04-01 NOTE — PROGRESS NOTES
Subjective:      Patient ID: Dwayne Gilliland is a 60 y.o. female    Chief Complaint   Patient presents with    Medication Check     pt is having to go to the rest room frequently     Referral - General     (GYN) pt has not had a menstrual in a while but she has started spotting     Other     she has been eating any food that she can get her hands on    Insomnia       HPI    Excessive thirst and urination. The patient has always had a tendency to  and drink any drinks that she sees around her. She will sometimes look for food. She has become much more interested in finding food and drink. She is drinking water and urinating more frequently at her group home. Her weight has gone up since she has her hip problem and she should be checked for diabetes. Her eating behavior could be related to her disabilities and/or her medication. I am suggesting we review her medication with a psychiatrist for trying to improve her abnormal behaviors. Staff at her group home is also concerned about her sleeping behavior. They say she does not seem to have an easier time sleeping and is often awake in her bed. Vaginal bleeding  She has been noted to have some bleeding over the last several months. Staff is not certain whether this could be urinary bleeding or vaginal bleeding. There is a remote history of a hysterectomy.     Current Outpatient Medications on File Prior to Visit   Medication Sig Dispense Refill    ciclopirox (LOPROX) 0.77 % cream APPLY TO TOE WEB 2 TIMES A DAY 30 g 1    Disposable Gloves (VINYL GLOVES MEDIUM) MISC FACILITY REQUESTING ORDER FOR EXAM GLOVES 100 each 5    Diapers & Supplies MISC USE 1 6 TIMES A  each 3    Clotrimazole 1 % OINT Apply 1 each topically 2 times daily as needed (rash) 60 g 1    loperamide (RA ANTI-DIARRHEAL) 2 MG capsule Take 1 capsule by mouth 2 times daily as needed for Diarrhea 20 capsule 1    sertraline (ZOLOFT) 100 MG tablet TAKE ONE TABLET BY MOUTH ONCE A DAY. 30 tablet 11    clomiPRAMINE (ANAFRANIL) 50 MG capsule TAKE TWO (2) CAPSULES BY MOUTH AT BEDTIME. 60 capsule 11    diclofenac (VOLTAREN) 75 MG EC tablet TAKE 1 TABLET BY MOUTH TWICE DAILY. 60 tablet 11    propranolol (INDERAL) 40 MG tablet TAKE ONE TABLET BY MOUTH THREE TIMES DAILY. 90 tablet 11    perphenazine 2 MG tablet TAKE 1 TABLET BY MOUTH AT BEDTIME 30 tablet 11    BANOPHEN 25 MG capsule TAKE ONE (1) CAPSULE BY MOUTH AT BEDTIME. 30 capsule 11    loratadine (CLARITIN) 10 MG tablet TAKE ONE TABLET BY MOUTH ONCE A DAY. 30 tablet 11    sertraline (ZOLOFT) 50 MG tablet TAKE ONE TABLET BY MOUTH ONCE A DAY. 30 tablet 11    Calcium-Vitamin D-Vitamin K (VIACTIV CALCIUM PLUS D) 650-12.5-40 MG-MCG-MCG CHEW Take 1 tablet by mouth daily 30 tablet 11    acetaminophen (APAP EXTRA STRENGTH) 500 MG tablet Take 2 tablets by mouth 3 times daily 270 tablet 5    triamcinolone (KENALOG) 0.1 % cream Apply topically 2 times daily. 30 g 1    polyethylene glycol (GLYCOLAX) powder Take 17 g by mouth nightly.  EPINEPHrine (EPIPEN 2-JAG IJ) Inject  as directed.  EPINEPHrine (EPIPEN 2-JAG) 0.3 MG/0.3ML SOAJ injection USE AS DRECTED. TAKE WITH ON ALL OUTINGS. 2 each 5    ACETAMINOPHEN EXTRA STRENGTH 500 MG tablet TAKE TWO (2) TABLETS BY MOUTH THREE (3) TIMES DAILY 180 tablet 11    propranolol (INDERAL) 20 MG tablet TAKE 2 TABLETS (40MG) BY MOUTH THREE TIMES A DAY (Patient not taking: Reported on 4/1/2021) 186 tablet 4    acetaminophen (TYLENOL) 500 MG tablet Take 2 tablets by mouth 3 times daily 360 tablet 3    Calcium-Vitamin D-Vitamin K (VIACTIV) 789-071-34 MG-UNT-MCG CHEW Take 1 tablet by mouth 2 times daily 60 tablet 5    EPIPEN 2-JAG 0.3 MG/0.3ML SOAJ injection USE AS DRECTED. TAKE WITH ON ALL OUTINGS. 2 each 3    BANOPHEN 25 MG tablet TAKE 1 TABLET BY MOUTH NIGHTLY (DIPHENHYDRAMINE) 30 tablet 2     No current facility-administered medications on file prior to visit.         Allergies   Allergen Reactions    Bee Venom Anaphylaxis    Amoxil [Amoxicillin]     Bactrim [Sulfamethoxazole-Trimethoprim]     Codeine     Strawberry Flavor Rash       Review of Systems   Constitutional: Negative for fatigue, fever and unexpected weight change. HENT: Negative for congestion and hearing loss. Eyes: Negative for redness and visual disturbance. Respiratory: Negative for cough, chest tightness and shortness of breath. Cardiovascular: Negative for chest pain and leg swelling. Gastrointestinal: Negative for abdominal pain and nausea. Genitourinary: Negative for dysuria and frequency. Musculoskeletal: Negative for arthralgias, back pain and neck pain. Skin: Negative for rash and wound. Neurological: Negative for dizziness, weakness and headaches. Hematological: Negative for adenopathy. Does not bruise/bleed easily. Psychiatric/Behavioral: Positive for behavioral problems and sleep disturbance. Negative for hallucinations and self-injury. The patient is not nervous/anxious. She is inattentive. She does not look directly in anyone's face or eyes. She does not have any conversation. She is constantly looking around. She sometimes picks at small pieces of fuzz, or trash which are laying around or on her clothes. Objective:   Physical Exam  Constitutional:       Appearance: She is well-developed. Cardiovascular:      Rate and Rhythm: Normal rate and regular rhythm. Heart sounds: No murmur. Pulmonary:      Effort: Pulmonary effort is normal.      Breath sounds: Normal breath sounds. Abdominal:      Palpations: Abdomen is soft. Skin:     General: Skin is warm and dry. Findings: No rash. Neurological:      Mental Status: She is oriented to person, place, and time. Assessment and plan       1. Post-menopausal bleeding  Postmenopausal bleeding. This potentially could be urinary bleeding.   Pelvic exam appropriate, but may be difficult given patient's inability to cooperate fully. - Ambulatory referral to Gynecology    2. Mixed obsessional thoughts and acts  Abnormal behaviors. Refer to psychiatry for advice regarding difficulty with eating and sleeping behaviors. - External Referral to Psychiatry    3. Hyperglycemia  Polydipsia and polyuria. Check A1c and renal function. Also check her electrolytes. - Comprehensive Metabolic Panel; Future  - Hemoglobin A1C; Future    4. Other fatigue  Abnormal behavior. Check for thyroid function.  - TSH with Reflex;  Future

## 2021-04-08 ENCOUNTER — TELEPHONE (OUTPATIENT)
Dept: GYNECOLOGY | Age: 56
End: 2021-04-08

## 2021-04-08 ENCOUNTER — TELEPHONE (OUTPATIENT)
Dept: INTERNAL MEDICINE CLINIC | Age: 56
End: 2021-04-08

## 2021-04-08 NOTE — TELEPHONE ENCOUNTER
Spoke with nurse moved appt up to 4/12, says patient is in a wheelchair, but moves pretty good, and a staff member will be present with her during visit also due to her being autistic.  DONE

## 2021-04-08 NOTE — TELEPHONE ENCOUNTER
New Patient     Patient lives in group home [facility] disabled [facility]. Alison Silva (caregiver/nurse) calling to set up appointment for patient. Patient started spotting about a month ago. Recently it's increased. Dr Royce Williamson referred that they get patient in to see Dr Carolina Guerrero.       Alison Silva (Nurse) gave her personal cell phone as a contact [phone]

## 2021-04-15 ENCOUNTER — OFFICE VISIT (OUTPATIENT)
Dept: GYNECOLOGY | Age: 56
End: 2021-04-15
Payer: MEDICARE

## 2021-04-15 VITALS
RESPIRATION RATE: 17 BRPM | WEIGHT: 204 LBS | HEIGHT: 69 IN | TEMPERATURE: 96.6 F | HEART RATE: 69 BPM | BODY MASS INDEX: 30.21 KG/M2 | DIASTOLIC BLOOD PRESSURE: 66 MMHG | SYSTOLIC BLOOD PRESSURE: 102 MMHG

## 2021-04-15 DIAGNOSIS — N93.9 VAGINAL BLEEDING: ICD-10-CM

## 2021-04-15 DIAGNOSIS — F84.0 AUTISM: ICD-10-CM

## 2021-04-15 DIAGNOSIS — Z01.419 WELL WOMAN EXAM WITH ROUTINE GYNECOLOGICAL EXAM: Primary | ICD-10-CM

## 2021-04-15 DIAGNOSIS — Z12.31 ENCOUNTER FOR SCREENING MAMMOGRAM FOR MALIGNANT NEOPLASM OF BREAST: ICD-10-CM

## 2021-04-15 PROCEDURE — G0101 CA SCREEN;PELVIC/BREAST EXAM: HCPCS | Performed by: OBSTETRICS & GYNECOLOGY

## 2021-04-15 RX ORDER — DIAZEPAM 5 MG/1
5 TABLET ORAL EVERY 12 HOURS PRN
Qty: 10 TABLET | Refills: 0 | Status: SHIPPED | OUTPATIENT
Start: 2021-04-15 | End: 2021-04-25

## 2021-04-19 ENCOUNTER — TELEPHONE (OUTPATIENT)
Dept: GYNECOLOGY | Age: 56
End: 2021-04-19

## 2021-04-19 NOTE — TELEPHONE ENCOUNTER
There has been an increase in patient's bleeding. Bright Red. They have had to put pads in her diapers. Insurance would have to approve CT Scan. They are wondering how to get this bumped up because patient's condition seems to be getting worse.       Please contact Fernando Wood [phone]

## 2021-04-19 NOTE — TELEPHONE ENCOUNTER
Called and spoke to Dillon Rider informed her that the scheduling department through [facility] handles scheduling CT scan and also if something has been approved and etc. Charly Cobb the number she stated she had the number already.

## 2021-04-26 ENCOUNTER — HOSPITAL ENCOUNTER (OUTPATIENT)
Dept: CT IMAGING | Age: 56
Discharge: HOME | End: 2021-04-26
Payer: MEDICARE

## 2021-04-26 DIAGNOSIS — N93.9 VAGINAL BLEEDING: ICD-10-CM

## 2021-04-26 DIAGNOSIS — F84.0 AUTISM: ICD-10-CM

## 2021-04-26 PROCEDURE — 74176 CT ABD & PELVIS W/O CONTRAST: CPT

## 2021-04-26 ASSESSMENT — ENCOUNTER SYMPTOMS
GASTROINTESTINAL NEGATIVE: 1
RESPIRATORY NEGATIVE: 1
EYES NEGATIVE: 1

## 2021-04-27 NOTE — PROGRESS NOTES
Subjective:      Patient ID: Constantin Aguero is a 60 y.o. female. Patient is here for annual. Patient with autism and is non verbal. ? Bleeding in pad? Review of Systems   Constitutional: Negative. HENT: Negative. Eyes: Negative. Respiratory: Negative. Cardiovascular: Negative. Gastrointestinal: Negative. Genitourinary: Positive for vaginal bleeding. Musculoskeletal: Negative. Skin: Negative. Neurological: Positive for speech difficulty. Psychiatric/Behavioral: The patient is nervous/anxious.       Date of Birth 1965  Past Medical History:   Diagnosis Date    Autism     Irregular uterine bleeding     Left hip pain     Menopause ovarian failure     Mental retardation     OCD (obsessive compulsive disorder)     Urinary incontinence      Past Surgical History:   Procedure Laterality Date    BREAST BIOPSY      HYSTERECTOMY      HYSTERECTOMY, TOTAL ABDOMINAL       OB History    Para Term  AB Living   0 0 0 0 0 0   SAB TAB Ectopic Molar Multiple Live Births   0 0 0 0 0 0     Social History     Socioeconomic History    Marital status: Single     Spouse name: Not on file    Number of children: 0    Years of education: Not on file    Highest education level: Not on file   Occupational History    Occupation: disability    Social Needs    Financial resource strain: Not hard at all   Sil-Erika insecurity     Worry: Never true     Inability: Never true    Transportation needs     Medical: No     Non-medical: No   Tobacco Use    Smoking status: Never Smoker    Smokeless tobacco: Never Used   Substance and Sexual Activity    Alcohol use: No    Drug use: No    Sexual activity: Never   Lifestyle    Physical activity     Days per week: Not on file     Minutes per session: Not on file    Stress: Not on file   Relationships    Social connections     Talks on phone: Not on file     Gets together: Not on file     Attends Sabianism service: Not on file Active member of club or organization: Not on file     Attends meetings of clubs or organizations: Not on file     Relationship status: Not on file    Intimate partner violence     Fear of current or ex partner: Not on file     Emotionally abused: Not on file     Physically abused: Not on file     Forced sexual activity: Not on file   Other Topics Concern    Not on file   Social History Narrative    Not on file     Allergies   Allergen Reactions    Bee Venom Anaphylaxis    Amoxil [Amoxicillin]     Bactrim [Sulfamethoxazole-Trimethoprim]     Codeine     Strawberry Flavor Rash     Outpatient Medications Marked as Taking for the 4/15/21 encounter (Office Visit) with Judge Logan MD   Medication Sig Dispense Refill    [] diazePAM (VALIUM) 5 MG tablet Take 1 tablet by mouth every 12 hours as needed for Anxiety (one hour before CT scan) for up to 10 days. 10 tablet 0    propranolol (INDERAL) 40 MG tablet TAKE ONE TABLET BY MOUTH THREE TIMES DAILY. 90 tablet 11    clomiPRAMINE (ANAFRANIL) 50 MG capsule TAKE TWO (2) CAPSULES BY MOUTH AT BEDTIME. 60 capsule 11    sertraline (ZOLOFT) 100 MG tablet TAKE ONE TABLET BY MOUTH ONCE A DAY. 30 tablet 11    diclofenac (VOLTAREN) 75 MG EC tablet TAKE 1 TABLET BY MOUTH TWICE DAILY. 60 tablet 11    perphenazine 2 MG tablet TAKE 1 TABLET BY MOUTH AT BEDTIME 30 tablet 11    loratadine (CLARITIN) 10 MG tablet TAKE ONE TABLET BY MOUTH ONCE A DAY. 30 tablet 11    BANOPHEN 25 MG capsule TAKE ONE (1) CAPSULE BY MOUTH AT BEDTIME. 30 capsule 11    ACETAMINOPHEN EXTRA STRENGTH 500 MG tablet TAKE TWO (2) TABLETS BY MOUTH THREE (3) TIMES DAILY 180 tablet 3    sertraline (ZOLOFT) 50 MG tablet TAKE ONE TABLET BY MOUTH ONCE A DAY.  30 tablet 11    ciclopirox (LOPROX) 0.77 % cream APPLY TO TOE WEB 2 TIMES A DAY 30 g 1    Disposable Gloves (VINYL GLOVES MEDIUM) MISC FACILITY REQUESTING ORDER FOR EXAM GLOVES 100 each 5    Diapers & Supplies MISC USE 1 6 TIMES A  each 3    Clotrimazole 1 % OINT Apply 1 each topically 2 times daily as needed (rash) 60 g 1    EPINEPHrine (EPIPEN 2-JAG) 0.3 MG/0.3ML SOAJ injection USE AS DRECTED. TAKE WITH ON ALL OUTINGS. 2 each 5    loperamide (RA ANTI-DIARRHEAL) 2 MG capsule Take 1 capsule by mouth 2 times daily as needed for Diarrhea 20 capsule 1    Calcium-Vitamin D-Vitamin K (VIACTIV CALCIUM PLUS D) 650-12.5-40 MG-MCG-MCG CHEW Take 1 tablet by mouth daily 30 tablet 11    acetaminophen (TYLENOL) 500 MG tablet Take 2 tablets by mouth 3 times daily 360 tablet 3    acetaminophen (APAP EXTRA STRENGTH) 500 MG tablet Take 2 tablets by mouth 3 times daily 270 tablet 5    Calcium-Vitamin D-Vitamin K (VIACTIV) 483-332-29 MG-UNT-MCG CHEW Take 1 tablet by mouth 2 times daily 60 tablet 5    EPIPEN 2-JAG 0.3 MG/0.3ML SOAJ injection USE AS DRECTED. TAKE WITH ON ALL OUTINGS. 2 each 3    BANOPHEN 25 MG tablet TAKE 1 TABLET BY MOUTH NIGHTLY (DIPHENHYDRAMINE) 30 tablet 2    triamcinolone (KENALOG) 0.1 % cream Apply topically 2 times daily. 30 g 1    polyethylene glycol (GLYCOLAX) powder Take 17 g by mouth nightly.  EPINEPHrine (EPIPEN 2-JAG IJ) Inject  as directed. Family History   Problem Relation Age of Onset    Colon Cancer Paternal Grandfather      /66 (Site: Right Lower Arm, Position: Sitting, Cuff Size: Large Adult)   Pulse 69   Temp 96.6 °F (35.9 °C) (Oral)   Resp 17   Ht 5' 9\" (1.753 m)   Wt 204 lb (92.5 kg)   Breastfeeding No   BMI 30.13 kg/m²       Objective:   Physical Exam  Constitutional:       General: She is not in acute distress. Appearance: She is well-developed. HENT:      Head: Normocephalic and atraumatic. Eyes:      Pupils: Pupils are equal, round, and reactive to light. Neck:      Musculoskeletal: Normal range of motion and neck supple. Thyroid: No thyromegaly. Cardiovascular:      Rate and Rhythm: Normal rate and regular rhythm. Heart sounds: Normal heart sounds. No murmur.  No friction rub. No gallop. Pulmonary:      Effort: Pulmonary effort is normal. No respiratory distress. Breath sounds: Normal breath sounds. No wheezing or rales. Chest:      Breasts:         Right: Normal. No swelling, bleeding, inverted nipple, mass, nipple discharge, skin change or tenderness. Left: Normal. No swelling, bleeding, inverted nipple, mass, nipple discharge, skin change or tenderness. Abdominal:      General: There is no distension. Palpations: Abdomen is soft. There is no mass. Tenderness: There is no abdominal tenderness. There is no guarding or rebound. Genitourinary:     Labia:         Right: No rash, tenderness, lesion or injury. Left: No rash, tenderness, lesion or injury. Urethra: No prolapse, urethral pain, urethral swelling or urethral lesion. Vagina: No signs of injury and foreign body. No vaginal discharge, erythema, tenderness, bleeding, lesions or prolapsed vaginal walls. Comments: Very quick vaginal exam-no masses  Musculoskeletal: Normal range of motion. General: No tenderness. Lymphadenopathy:      Cervical: No cervical adenopathy. Skin:     General: Skin is warm and dry. Neurological:      Mental Status: She is alert and oriented to person, place, and time. Psychiatric:         Behavior: Behavior normal.         Assessment:      1. Annual  2. Menopause  3. Vaginal bleeding  4. autism      Plan:      1. Could not tolerate pelvic exam, calcium, exercise  2. See below  3. May need EUA-given autism. Try to get CT scan abd/pv to see if can find anything. Try 5 mg Valium prior if can get  4. stable      Will try to set up EUA-need to figure this out. ?  Should do at same time as colonoscopy    Serena Barnett MD

## 2021-04-30 ENCOUNTER — TELEPHONE (OUTPATIENT)
Dept: GYNECOLOGY | Age: 56
End: 2021-04-30

## 2021-04-30 NOTE — TELEPHONE ENCOUNTER
Aleja Barnes from [facility] where patient resides, and her CT resuts are in, and patient's sister calling [employer] shows and where do we go from here.  Can you please review and give Eliazar Mackenzie call back at [phone]

## 2021-05-03 NOTE — TELEPHONE ENCOUNTER
Can you call place and see if patient has had any recent urinary catheter in her? I need to figure out some things on the CT scan. No masses seen but there was a question about some air? In bladder? Keep open.

## 2021-05-03 NOTE — TELEPHONE ENCOUNTER
Called spoke with Alexandria Jurado she says she has never had a urinary catheter in, advised her of what doctor did see re air in bladder, will let Dr. Emilio Hauser know no the catheter and see what she recommends, and will call her back once advised.

## 2021-06-01 ENCOUNTER — TELEPHONE (OUTPATIENT)
Dept: INTERNAL MEDICINE CLINIC | Age: 56
End: 2021-06-01

## 2021-06-03 NOTE — TELEPHONE ENCOUNTER
Called and spoke to Hero Gong and informed her that Dr Baldemar Garcia is working on a time as soon as we have a time we will call her and let her know.

## 2021-06-04 ENCOUNTER — OFFICE VISIT (OUTPATIENT)
Dept: INTERNAL MEDICINE CLINIC | Age: 56
End: 2021-06-04
Payer: MEDICARE

## 2021-06-04 VITALS
HEIGHT: 65 IN | TEMPERATURE: 97 F | SYSTOLIC BLOOD PRESSURE: 118 MMHG | BODY MASS INDEX: 36.35 KG/M2 | DIASTOLIC BLOOD PRESSURE: 82 MMHG | RESPIRATION RATE: 16 BRPM | HEART RATE: 81 BPM | WEIGHT: 218.2 LBS | OXYGEN SATURATION: 98 %

## 2021-06-04 DIAGNOSIS — R53.83 OTHER FATIGUE: ICD-10-CM

## 2021-06-04 DIAGNOSIS — R73.9 HYPERGLYCEMIA: ICD-10-CM

## 2021-06-04 DIAGNOSIS — F84.0 AUTISM: ICD-10-CM

## 2021-06-04 DIAGNOSIS — Z12.11 COLON CANCER SCREENING: ICD-10-CM

## 2021-06-04 DIAGNOSIS — M16.12 PRIMARY OSTEOARTHRITIS OF LEFT HIP: ICD-10-CM

## 2021-06-04 DIAGNOSIS — Z00.00 ROUTINE GENERAL MEDICAL EXAMINATION AT A HEALTH CARE FACILITY: Primary | ICD-10-CM

## 2021-06-04 DIAGNOSIS — R60.9 DEPENDENT EDEMA: ICD-10-CM

## 2021-06-04 DIAGNOSIS — E66.9 OBESITY (BMI 35.0-39.9 WITHOUT COMORBIDITY): ICD-10-CM

## 2021-06-04 LAB
A/G RATIO: 1.5 (ref 1.1–2.2)
ALBUMIN SERPL-MCNC: 4.3 G/DL (ref 3.4–5)
ALP BLD-CCNC: 220 U/L (ref 40–129)
ALT SERPL-CCNC: 32 U/L (ref 10–40)
ANION GAP SERPL CALCULATED.3IONS-SCNC: 13 MMOL/L (ref 3–16)
AST SERPL-CCNC: 28 U/L (ref 15–37)
BILIRUB SERPL-MCNC: <0.2 MG/DL (ref 0–1)
BUN BLDV-MCNC: 30 MG/DL (ref 7–20)
CALCIUM SERPL-MCNC: 9.6 MG/DL (ref 8.3–10.6)
CHLORIDE BLD-SCNC: 102 MMOL/L (ref 99–110)
CO2: 26 MMOL/L (ref 21–32)
CREAT SERPL-MCNC: 1.1 MG/DL (ref 0.6–1.1)
GFR AFRICAN AMERICAN: >60
GFR NON-AFRICAN AMERICAN: 51
GLOBULIN: 2.9 G/DL
GLUCOSE BLD-MCNC: 87 MG/DL (ref 70–99)
POTASSIUM SERPL-SCNC: 4.6 MMOL/L (ref 3.5–5.1)
SODIUM BLD-SCNC: 141 MMOL/L (ref 136–145)
TOTAL PROTEIN: 7.2 G/DL (ref 6.4–8.2)
TSH REFLEX: 1.63 UIU/ML (ref 0.27–4.2)

## 2021-06-04 PROCEDURE — 3017F COLORECTAL CA SCREEN DOC REV: CPT | Performed by: INTERNAL MEDICINE

## 2021-06-04 PROCEDURE — G0439 PPPS, SUBSEQ VISIT: HCPCS | Performed by: INTERNAL MEDICINE

## 2021-06-04 ASSESSMENT — PATIENT HEALTH QUESTIONNAIRE - PHQ9
SUM OF ALL RESPONSES TO PHQ QUESTIONS 1-9: 0
1. LITTLE INTEREST OR PLEASURE IN DOING THINGS: 0
2. FEELING DOWN, DEPRESSED OR HOPELESS: 0
SUM OF ALL RESPONSES TO PHQ QUESTIONS 1-9: 0
SUM OF ALL RESPONSES TO PHQ QUESTIONS 1-9: 0
SUM OF ALL RESPONSES TO PHQ9 QUESTIONS 1 & 2: 0

## 2021-06-04 ASSESSMENT — LIFESTYLE VARIABLES
HOW OFTEN DO YOU HAVE A DRINK CONTAINING ALCOHOL: NEVER
AUDIT-C TOTAL SCORE: INCOMPLETE
HOW OFTEN DO YOU HAVE A DRINK CONTAINING ALCOHOL: 0
AUDIT TOTAL SCORE: INCOMPLETE

## 2021-06-04 NOTE — PATIENT INSTRUCTIONS
Personalized Preventive Plan for Khai Haile - 6/4/2021  Medicare offers a range of preventive health benefits. Some of the tests and screenings are paid in full while other may be subject to a deductible, co-insurance, and/or copay. Some of these benefits include a comprehensive review of your medical history including lifestyle, illnesses that may run in your family, and various assessments and screenings as appropriate. After reviewing your medical record and screening and assessments performed today your provider may have ordered immunizations, labs, imaging, and/or referrals for you. A list of these orders (if applicable) as well as your Preventive Care list are included within your After Visit Summary for your review. Other Preventive Recommendations:    · A preventive eye exam performed by an eye specialist is recommended every 1-2 years to screen for glaucoma; cataracts, macular degeneration, and other eye disorders. · A preventive dental visit is recommended every 6 months. · Try to get at least 150 minutes of exercise per week or 10,000 steps per day on a pedometer . · Order or download the FREE \"Exercise & Physical Activity: Your Everyday Guide\" from [employer]. Call 8-775.117.4047 or search [employer] online. · You need 0965-3246 mg of calcium and 9897-1510 IU of vitamin D per day. It is possible to meet your calcium requirement with diet alone, but a vitamin D supplement is usually necessary to meet this goal.  · When exposed to the sun, use a sunscreen that protects against both UVA and UVB radiation with an SPF of 30 or greater. Reapply every 2 to 3 hours or after sweating, drying off with a towel, or swimming. · Always wear a seat belt when traveling in a car. Always wear a helmet when riding a bicycle or motorcycle.

## 2021-06-04 NOTE — PROGRESS NOTES
AWV/ pt has bilateral leg edema and beginning to have dermatitis. Pt present w/ Edin Mcpherson who is caregiver.

## 2021-06-04 NOTE — PROGRESS NOTES
Medicare Annual Wellness Visit  Name: Sharad Quiles Date: 2021   MRN: <[id number]> Sex: Female   Age: 60 y.o. Ethnicity: Non-/Non    : 1965 Race: Gia Knapp is here for Medicare AWV    Screenings for behavioral, psychosocial and functional/safety risks, and cognitive dysfunction are all negative except as indicated below. These results, as well as other patient data from the [address] form, are documented in Flowsheets linked to this Encounter. Allergies   Allergen Reactions    Bee Venom Anaphylaxis    Amoxil [Amoxicillin]     Bactrim [Sulfamethoxazole-Trimethoprim]     Codeine     Shellfish-Derived Products      Includes Iodine    Strawberry Flavor Rash         Prior to Visit Medications    Medication Sig Taking? Authorizing Provider   propranolol (INDERAL) 40 MG tablet TAKE ONE TABLET BY MOUTH THREE TIMES DAILY. Yes Burgess Bianka MD   clomiPRAMINE (ANAFRANIL) 50 MG capsule TAKE TWO (2) CAPSULES BY MOUTH AT BEDTIME. Yes Burgess Bianka MD   sertraline (ZOLOFT) 100 MG tablet TAKE ONE TABLET BY MOUTH ONCE A DAY. Yes Burgess Bianka MD   diclofenac (VOLTAREN) 75 MG EC tablet TAKE 1 TABLET BY MOUTH TWICE DAILY. Yes Burgess Bianka MD   perphenazine 2 MG tablet TAKE 1 TABLET BY MOUTH AT BEDTIME Yes Burgess Bianka MD   loratadine (CLARITIN) 10 MG tablet TAKE ONE TABLET BY MOUTH ONCE A DAY. Yes Burgess Bianka MD   BANOPHEN 25 MG capsule TAKE ONE (1) CAPSULE BY MOUTH AT BEDTIME. Yes Burgess Bianka MD   ACETAMINOPHEN EXTRA STRENGTH 500 MG tablet TAKE TWO (2) TABLETS BY MOUTH THREE (3) TIMES DAILY Yes Burgess Bianka MD   sertraline (ZOLOFT) 50 MG tablet TAKE ONE TABLET BY MOUTH ONCE A DAY.  Yes Burgess Bianka MD   ciclopirox (LOPROX) 0.77 % cream APPLY TO TOE WEB 2 TIMES A DAY Yes Burgess Bianka MD   Disposable Gloves (VINYL GLOVES MEDIUM) MISC FACILITY REQUESTING ORDER FOR EXAM GLOVES Yes Burgess Bianka MD   Diapers & Supplies MISC USE 1 6 TIMES A DAY Yes Burgess Bianka MD   Clotrimazole 1 % OINT Apply 1 each topically 2 times daily as needed (rash) Yes Vicki Russell MD   EPINEPHrine (EPIPEN 2-AJG) 0.3 MG/0.3ML SOAJ injection USE AS DRECTED. TAKE WITH ON ALL OUTINGS. Yes Vicki Russell MD   loperamide (RA ANTI-DIARRHEAL) 2 MG capsule Take 1 capsule by mouth 2 times daily as needed for Diarrhea Yes Vicki Russell MD   Calcium-Vitamin D-Vitamin K (VIACTIV CALCIUM PLUS D) 650-12.5-40 MG-MCG-MCG CHEW Take 1 tablet by mouth daily Yes Vicki Russell MD   acetaminophen (TYLENOL) 500 MG tablet Take 2 tablets by mouth 3 times daily Yes Vicki Russell MD   acetaminophen (APAP EXTRA STRENGTH) 500 MG tablet Take 2 tablets by mouth 3 times daily Yes Vicki Russell MD   Calcium-Vitamin D-Vitamin K (VIACTIV) 962-439-51 MG-UNT-MCG CHEW Take 1 tablet by mouth 2 times daily Yes Vicki Russell MD   EPIPEN 2-JAG 0.3 MG/0.3ML SOAJ injection USE AS DRECTED. TAKE WITH ON ALL OUTINGS. Yes Vicki Russell MD   BANOPHEN 25 MG tablet TAKE 1 TABLET BY MOUTH NIGHTLY (DIPHENHYDRAMINE) Yes Vicki Russell MD   triamcinolone (KENALOG) 0.1 % cream Apply topically 2 times daily. Yes Vicki Russell MD   polyethylene glycol (GLYCOLAX) powder Take 17 g by mouth nightly. Yes Historical Provider, MD   EPINEPHrine (EPIPEN 2-JAG IJ) Inject  as directed.  Yes Historical Provider, MD         Past Medical History:   Diagnosis Date    Autism     Irregular uterine bleeding     Left hip pain     Menopause ovarian failure     Mental retardation     OCD (obsessive compulsive disorder)     Urinary incontinence        Past Surgical History:   Procedure Laterality Date    BREAST BIOPSY      HYSTERECTOMY  1975    HYSTERECTOMY, TOTAL ABDOMINAL           Family History   Problem Relation Age of Onset    Colon Cancer Paternal Grandfather        CareTeam (Including outside providers/suppliers regularly involved in providing care):   Patient Care Team:  Vicki Russell MD as PCP - General  Vicki Russell MD as PCP - [facility] Empaneled Provider  Lonia Babinski, MD as Consulting Physician  Julia Umana, MD as Consulting Physician (Ophthalmology)  Ning Bah MD as Obstetrician (Obstetrics & Gynecology)    [facility] Readings from Last 3 Encounters:   06/04/21 218 lb 3.2 oz (99 kg)   04/15/21 204 lb (92.5 kg)   04/01/21 214 lb (97.1 kg)     Vitals:    06/04/21 1103   BP: 118/82   Pulse: 81   Resp: 16   Temp: 97 °F (36.1 °C)   TempSrc: Temporal   SpO2: 98%   Weight: 218 lb 3.2 oz (99 kg)   Height: 5' 4.96\" (1.65 m)     Body mass index is 36.35 kg/m². Based upon direct observation of the patient, evaluation of cognition reveals recent and remote memory intact. General Appearance: alert and oriented to person, place and time, well developed and well- nourished, in no acute distress  Skin: warm and dry, no rash or erythema  Head: normocephalic and atraumatic  Eyes: pupils equal, round, and reactive to light, extraocular eye movements intact, conjunctivae normal  ENT: tympanic membrane, external ear and ear canal normal bilaterally, nose without deformity, nasal mucosa and turbinates normal without polyps  Neck: supple and non-tender without mass, no thyromegaly or thyroid nodules, no cervical lymphadenopathy  Pulmonary/Chest: clear to auscultation bilaterally- no wheezes, rales or rhonchi, normal air movement, no respiratory distress  Cardiovascular: normal rate, regular rhythm, normal S1 and S2, no murmurs, rubs, clicks, or gallops, distal pulses intact, no carotid bruits  Abdomen: soft, non-tender, non-distended, normal bowel sounds, no masses or organomegaly  Extremities: tace edema  Musculoskeletal: severe left hip pain with standing, decreased ROM, inability to ambulate or stand bearing weight, need for wheelchair, can assist with her hands , and pull herself in chaichair with her feet   Neurologic: reflexes normal and symmetric, no cranial nerve deficit, gait, coordination and speech normal    Patient's complete Health Risk Assessment and screening values have been reviewed and are found in Flowsheets.  The following problems were reviewed today and where indicated follow up appointments were made and/or referrals ordered. Positive Risk Factor Screenings with Interventions:            General Health and ACP:  General  In general, how would you say your health is?: Good  In the past 7 days, have you experienced any of the following? New or Increased Pain, New or Increased Fatigue, Loneliness, Social Isolation, Stress or Anger?: None of These  Do you get the social and emotional support that you need?: Yes  Do you have a Living Will?: (!) No  Advance Directives     Power of [facility] Will ACP-Advance Directive ACP-Power of     Not on File Not on File Not on File Not on File      General Health Risk Interventions:  · No Living Will: Patient declines ACP discussion/assistance    Health Habits/Nutrition:  Health Habits/Nutrition  Do you exercise for at least 20 minutes 2-3 times per week?: (!) No  Have you lost any weight without trying in the past 3 months?: No  Do you eat only one meal per day?: No  Have you seen the dentist within the past year?: Yes  Body mass index: (!) 36.35  Health Habits/Nutrition Interventions:  · Inadequate physical activity:  patient is not ready to increase his/her physical activity level at this time      ADL:  ADLs  In the past 7 days, did you need help from others to perform any of the following everyday activities? Eating, dressing, grooming, bathing, toileting, or walking/balance?: (!) Walking/Balance  In the past 7 days, did you need help from others to take care of any of the following?  Laundry, housekeeping, banking/finances, shopping, telephone use, food preparation, transportation, or taking medications?: None  ADL Interventions:  · Patient declines any further evaluation/treatment for this issue    Personalized Preventive Plan   Current Health Maintenance Status  Immunization History   Administered Date(s) Administered    COVID-19, Moderna, PF, 100mcg/0.5mL 01/20/2021, 02/17/2021    Influenza A (T4E1-01) Vaccine PF IM 12/21/2009    Influenza Virus Vaccine 11/14/2006, 10/15/2008, 09/26/2009, 09/28/2011, 09/22/2012, 10/31/2013, 10/19/2016, 11/01/2017, 11/01/2018, 10/01/2019    Influenza, Intradermal, Preservative free 09/29/2014    Influenza, Intradermal, Quadrivalent, Preservative Free 11/28/2016    Influenza, Quadv, IM, PF (6 mo and older Fluzone, Flulaval, Fluarix, and 3 yrs and older Afluria) 11/20/2018    PPD Test 07/11/2013    Tdap (Boostrix, Adacel) 07/11/2013, 07/11/2013        Health Maintenance   Topic Date Due    Shingles Vaccine (1 of 2) Never done   [employer] Visit (AWV)  Never done    Colon Cancer Screen FIT/FOBT  02/19/2020    Flu vaccine (Season Ended) 09/01/2021    Breast cancer screen  06/08/2022    DTaP/Tdap/Td vaccine (2 - Td or Tdap) 07/11/2023    Lipid screen  08/13/2025    COVID-19 Vaccine  Completed    Hepatitis C screen  Completed    HIV screen  Completed    Hepatitis A vaccine  Aged Out    Hepatitis B vaccine  Aged Out    Hib vaccine  Aged Out    Meningococcal (ACWY) vaccine  Aged Out    Pneumococcal 0-64 years Vaccine  Aged Out     Recommendations for [employer] Due: see orders and patient instructions/AVS.  . Recommended screening schedule for the next 5-10 years is provided to the patient in written form: see Patient Instructions/AVS.    Nuzhat Waller was seen today for medicare awv. Diagnoses and all orders for this visit:    Routine general medical examination at a health care facility    Colon cancer screening  -     POCT Fecal Immunochemical Test (FIT)    Primary osteoarthritis of left hip    Dependent edema    Obesity (BMI 35.0-39.9 without comorbidity)    Autism           1. Routine general medical examination at a health care facility  Stable. See orders    2. Colon cancer screening  Screening.   - POCT Fecal Immunochemical Test (FIT)    3.  Primary osteoarthritis of left hip  Inability to ambulate,

## 2021-06-05 LAB
ESTIMATED AVERAGE GLUCOSE: 108.3 MG/DL
HBA1C MFR BLD: 5.4 %

## 2021-06-10 NOTE — TELEPHONE ENCOUNTER
Called and spoke to Jayshree Puga informed her surgery will be 7/16/21. Jayshree Puga is requesting the surgery be the earliest time possible. She states patient needs an early surgery. Informed Jayshree Puga that  I would let Dr Prince Him know.

## 2021-06-29 ENCOUNTER — TELEPHONE (OUTPATIENT)
Dept: INTERNAL MEDICINE CLINIC | Age: 56
End: 2021-06-29

## 2021-06-29 RX ORDER — CLOTRIMAZOLE AND BETAMETHASONE DIPROPIONATE 10; .64 MG/G; MG/G
CREAM TOPICAL
Qty: 15 G | Refills: 0 | Status: SHIPPED | OUTPATIENT
Start: 2021-06-29 | End: 2021-07-07

## 2021-07-07 RX ORDER — CLOTRIMAZOLE AND BETAMETHASONE DIPROPIONATE 10; .64 MG/G; MG/G
CREAM TOPICAL
Qty: 15 G | Refills: 0 | Status: SHIPPED | OUTPATIENT
Start: 2021-07-07 | End: 2021-07-09

## 2021-07-09 RX ORDER — CLOTRIMAZOLE AND BETAMETHASONE DIPROPIONATE 10; .64 MG/G; MG/G
CREAM TOPICAL
Qty: 15 G | Refills: 0 | Status: SHIPPED | OUTPATIENT
Start: 2021-07-09 | End: 2021-08-23

## 2021-07-13 NOTE — PROGRESS NOTES
Group home pt [facility] [phone]    Directed me to contact RN, Michel Champion [phone] message left- to call us back so we can compile information    I also called Yasemin and left message to call us back for any information on pt re: medical. [phone]    I also called Todd [phone], left message for sister to call us back

## 2021-07-14 ENCOUNTER — PREP FOR PROCEDURE (OUTPATIENT)
Dept: GYNECOLOGY | Age: 56
End: 2021-07-14

## 2021-07-14 ENCOUNTER — TELEPHONE (OUTPATIENT)
Dept: GYNECOLOGY | Age: 56
End: 2021-07-14

## 2021-07-14 RX ORDER — SODIUM CHLORIDE 0.9 % (FLUSH) 0.9 %
10 SYRINGE (ML) INJECTION EVERY 12 HOURS SCHEDULED
Status: CANCELLED | OUTPATIENT
Start: 2021-07-14

## 2021-07-14 RX ORDER — SODIUM CHLORIDE 0.9 % (FLUSH) 0.9 %
10 SYRINGE (ML) INJECTION PRN
Status: CANCELLED | OUTPATIENT
Start: 2021-07-14

## 2021-07-14 RX ORDER — SODIUM CHLORIDE 9 MG/ML
25 INJECTION, SOLUTION INTRAVENOUS PRN
Status: CANCELLED | OUTPATIENT
Start: 2021-07-14

## 2021-07-14 RX ORDER — CIPROFLOXACIN 2 MG/ML
400 INJECTION, SOLUTION INTRAVENOUS
Status: CANCELLED | OUTPATIENT
Start: 2021-07-14 | End: 2021-07-14

## 2021-07-14 RX ORDER — BENZONATATE 200 MG/1
200 CAPSULE ORAL 3 TIMES DAILY PRN
COMMUNITY
End: 2021-08-23

## 2021-07-14 RX ORDER — SODIUM CHLORIDE, SODIUM LACTATE, POTASSIUM CHLORIDE, CALCIUM CHLORIDE 600; 310; 30; 20 MG/100ML; MG/100ML; MG/100ML; MG/100ML
INJECTION, SOLUTION INTRAVENOUS CONTINUOUS
Status: CANCELLED | OUTPATIENT
Start: 2021-07-14

## 2021-07-14 NOTE — PROGRESS NOTES
Medication reconcile- completed  Jarod Doherty [phone] medical history to be faxed and Covid Proof    PAT interview complete other than above.

## 2021-07-14 NOTE — PROGRESS NOTES
Pt sister/ Guardian will be with pt DOS to sign consent and transport pt to and from surgery. Pt is non verbal, able to ambulate with assist, does use restroom at times with no assist per sister. Adelita Damon RN called x2 today and messages left to confirm medications and medical history. No return call, thus far.   Will await call today and follow up as necessary    [facility] office notified no H/P    PAT interview completed by Guardian except medications/medical history

## 2021-07-14 NOTE — PROGRESS NOTES
Vinita Garcia, RN of Group home- Re:medications and Medical hx  Message left to call us back, asap  3rd attempt [phone]    Called pt sister to see if she had another number for RN, Emily Mckeon and left message to let us know    Call also placed to Group home -- [facility] [phone] [employer] and left message to help us in getting ahold of Emily Mckeon RN.

## 2021-07-15 ENCOUNTER — ANESTHESIA EVENT (OUTPATIENT)
Dept: OPERATING ROOM | Age: 56
End: 2021-07-15
Payer: MEDICARE

## 2021-07-15 NOTE — H&P (VIEW-ONLY)
HISTORY AND PHYSICAL             Date: 7/15/2021        Patient Name: Lurdes Spring     YOB: 1965      Age:  60 y.o. Chief Complaint   Patient for evaluation of possible vesico-vaginal fistula, exam under anesthesia. History Obtained From   patient, family member - sister    History of Present Illness   Patient is a 60year old F with autism and mental disorder who presents for exam under anesthesia and cystoscopy. Patient ? Vaginal bleeding. Had CT showing possible air in bladder. Exam done in office but limited due to mental disabilities. No obvious abnormalities seen on exam.     Past Medical History     Past Medical History:   Diagnosis Date    Autism     COVID-19     4/2020    Irregular uterine bleeding     Left hip pain     Menopause ovarian failure     Mental retardation     OCD (obsessive compulsive disorder)     Urinary incontinence         Past Surgical History     Past Surgical History:   Procedure Laterality Date    BREAST BIOPSY      HYSTERECTOMY  1975    HYSTERECTOMY, TOTAL ABDOMINAL          Medications Prior to Admission     Prior to Admission medications    Medication Sig Start Date End Date Taking? Authorizing Provider   benzonatate (TESSALON) 200 MG capsule Take 200 mg by mouth 3 times daily as needed for Cough    Historical Provider, MD   clotrimazole-betamethasone (LOTRISONE) 1-0.05 % cream APPLY TOPICALLY TO AFFECTED AREA 2 TIMES DAILY  Patient taking differently: as needed  7/9/21   Alexandru Moscoso MD   ciclopirox (LOPROX) 0.77 % cream APPLY TO TOE WEB 2 TIMES A DAY  Patient taking differently: Apply topically 2 times daily as needed  6/8/21   Alexandru Moscoso MD   propranolol (INDERAL) 40 MG tablet TAKE ONE TABLET BY MOUTH THREE TIMES DAILY. 4/9/21   Wanda Champion MD   clomiPRAMINE (ANAFRANIL) 50 MG capsule TAKE TWO (2) CAPSULES BY MOUTH AT BEDTIME.   Patient taking differently: Take 100 mg by mouth nightly  4/9/21   Wanda Champion MD   sertraline (ZOLOFT) 100 MG tablet TAKE ONE TABLET BY MOUTH ONCE A DAY. 4/9/21   Chandler Gardner MD   diclofenac (VOLTAREN) 75 MG EC tablet TAKE 1 TABLET BY MOUTH TWICE DAILY. 4/9/21   Chandler Gardner MD   perphenazine 2 MG tablet TAKE 1 TABLET BY MOUTH AT BEDTIME 4/9/21   Chandler Gardner MD   loratadine (CLARITIN) 10 MG tablet TAKE ONE TABLET BY MOUTH ONCE A DAY. 4/9/21   Chandler Gardner MD   sertraline (ZOLOFT) 50 MG tablet TAKE ONE TABLET BY MOUTH ONCE A DAY. 4/9/21   Chandler Gardner MD   Disposable Gloves (VINYL GLOVES MEDIUM) MISC FACILITY REQUESTING ORDER FOR EXAM GLOVES 10/8/20   Chandler Gardner MD   Diapers & Supplies MISC USE 1 6 TIMES A DAY 10/8/20   Chandler Gardner MD   Clotrimazole 1 % OINT Apply 1 each topically 2 times daily as needed (rash) 8/31/20   Chandler Gardner MD   loperamide (RA ANTI-DIARRHEAL) 2 MG capsule Take 1 capsule by mouth 2 times daily as needed for Diarrhea 5/12/20   Chandler Gardner MD   Calcium-Vitamin D-Vitamin K (VIACTIV CALCIUM PLUS D) 198-61.6-69 MG-MCG-MCG CHEW Take 1 tablet by mouth daily 4/2/20   Chandler Gardner MD   acetaminophen (APAP EXTRA STRENGTH) 500 MG tablet Take 2 tablets by mouth 3 times daily 4/22/19   Alexandru Moscoso MD   EPIPEN 2-JAG 0.3 MG/0.3ML SOAJ injection USE AS DRECTED. TAKE WITH ON ALL OUTINGS. 6/5/17   Chandler aGrdner MD   BANOPHEN 25 MG tablet TAKE 1 TABLET BY MOUTH NIGHTLY (DIPHENHYDRAMINE) 4/13/16   Chandler Gardner MD   triamcinolone (KENALOG) 0.1 % cream Apply topically 2 times daily. 10/16/15   Alexandru Moscoso MD   polyethylene glycol (GLYCOLAX) powder Take 17 g by mouth nightly.     Historical Provider, MD        Allergies   Bee venom, Shellfish-derived products, Amoxil [amoxicillin], Bactrim [sulfamethoxazole-trimethoprim], Codeine, and Strawberry flavor    Social History     Social History     Tobacco History     Smoking Status  Never Smoker    Smokeless Tobacco Use  Never Used          Alcohol History     Alcohol Use Status  No          Drug Use     Drug Use Status  No          Sexual Activity     Sexually Active  Never                Family History review and advise when this can be closed     Dora Love MD   6/2/2021 11:01 PM EDT       Keep open-we are working on this to schedule her surgery.           Routing History    Priority Sent On From To Message Type    7/12/2021  8:15 PM Ky Bridges MD [facility] [address] Support Result Notes    7/12/2021  9:56 AM TAWANNA Green MD Result Notes    6/2/2021 11:01 PM Ky Bridges MD [facility] Support Result Notes    4/26/2021  4:25 PM Nam, [facility] Incoming Radiology Results From Isabela Soto MD Results   Radiation Dose Estimates    No radiation information found for this patient   Narrative   CT abdomen and pelvis without contrast       HISTORY: Vaginal bleeding   COMPARISON: none   CONTRAST:  None. Unable to establish IV access.     TECHNIQUE: Individualized dose optimization technique was used in order to meet ALARA standards for radiation dose reduction. In addition to vendor specific dose reduction algorithms, the dose reduction techniques vary based on the specific scanner    utilized but frequently include automated exposure control, adjustment of the mA and/or kV according to patient size, and use of iterative reconstruction technique.       COMMENTS:        Degenerative changes in the spine. Severe degenerative changes in the left hip joint. Mild subpleural atelectasis in the lung bases. Anatomic variant retroaortic left renal vein is incidentally noted. Small hiatal hernia. Gallbladder is within normal    limits. Solid abdominal organs are unremarkable. Small amount of air is seen in the bladder; correlate with recent catheterization. Alternately, this may represent a vesicovaginal or other fistula. Uterus is surgically absent. No lymphadenopathy.           Impression       No vaginal mass identified.       Small amount of air in the bladder; in the setting of vaginal bleeding, vesical fistula is a consideration.  Correlate with recent catheterization of the bladder.                       Order History    Open Order Details   PACS Images     Show images for CT ABDOMEN PELVIS WO CONTRAST Additional Contrast? Radiologist Recommendation (see below)   Results History Report    View Report   Associated Diagnoses    Vaginal bleeding       Autism       External Result Report    External Result Report   Existing Charges    Charge Line Charge Code Status Charge Trigger Charge Type   [id number]  Ct Abd/pelvis W/o Contrast [[id number]] [address] Billing Imaging end exam Technical   [id number]  Radiology Clinical Decision Support Mechanism Caridad Meigs [[id number]] [address] [facility] Billing Imaging end exam Technical   [id number] CT [facility] AND PELVIS,W/O CONTRAST [address] [facility] [facility]) Imaging result study Professional   [id number] [facility] deandra Casas [address] [facility]) Imaging result study Professional   Order Report     Order Details      Assessment    Patient is a 60year old F with autism and mental disabilities. For exam under anesthesia and autism. ? Vaginal bleeding. Only abnormality seen on CT ? Air in bladder. Plan   For exam under anesthesia and cystoscopy.    Consultations Ordered:  None    Electronically signed by Gm Alvarez MD on 7/15/21 at 9:33 AM EDT

## 2021-07-15 NOTE — H&P
HISTORY AND PHYSICAL             Date: 7/15/2021        Patient Name: Derek Silvestre     YOB: 1965      Age:  60 y.o. Chief Complaint   Patient for evaluation of possible vesico-vaginal fistula, exam under anesthesia. History Obtained From   patient, family member - sister    History of Present Illness   Patient is a 60year old F with autism and mental disorder who presents for exam under anesthesia and cystoscopy. Patient ? Vaginal bleeding. Had CT showing possible air in bladder. Exam done in office but limited due to mental disabilities. No obvious abnormalities seen on exam.     Past Medical History     Past Medical History:   Diagnosis Date    Autism     COVID-19     4/2020    Irregular uterine bleeding     Left hip pain     Menopause ovarian failure     Mental retardation     OCD (obsessive compulsive disorder)     Urinary incontinence         Past Surgical History     Past Surgical History:   Procedure Laterality Date    BREAST BIOPSY      HYSTERECTOMY  1975    HYSTERECTOMY, TOTAL ABDOMINAL          Medications Prior to Admission     Prior to Admission medications    Medication Sig Start Date End Date Taking? Authorizing Provider   benzonatate (TESSALON) 200 MG capsule Take 200 mg by mouth 3 times daily as needed for Cough    Historical Provider, MD   clotrimazole-betamethasone (LOTRISONE) 1-0.05 % cream APPLY TOPICALLY TO AFFECTED AREA 2 TIMES DAILY  Patient taking differently: as needed  7/9/21 Jan MD Danis   ciclopirox (LOPROX) 0.77 % cream APPLY TO TOE WEB 2 TIMES A DAY  Patient taking differently: Apply topically 2 times daily as needed  6/8/21 Jan MD Danis   propranolol (INDERAL) 40 MG tablet TAKE ONE TABLET BY MOUTH THREE TIMES DAILY. 4/9/21   Matheus Richard MD   clomiPRAMINE (ANAFRANIL) 50 MG capsule TAKE TWO (2) CAPSULES BY MOUTH AT BEDTIME.   Patient taking differently: Take 100 mg by mouth nightly  4/9/21   Matheus Richard MD   sertraline (ZOLOFT) 100 MG tablet TAKE ONE TABLET BY MOUTH ONCE A DAY. 4/9/21   Eulalio Betancourt MD   diclofenac (VOLTAREN) 75 MG EC tablet TAKE 1 TABLET BY MOUTH TWICE DAILY. 4/9/21   Eulalio Betancourt MD   perphenazine 2 MG tablet TAKE 1 TABLET BY MOUTH AT BEDTIME 4/9/21   Eulalio Betancourt MD   loratadine (CLARITIN) 10 MG tablet TAKE ONE TABLET BY MOUTH ONCE A DAY. 4/9/21   Eulalio Betancourt MD   sertraline (ZOLOFT) 50 MG tablet TAKE ONE TABLET BY MOUTH ONCE A DAY. 4/9/21   Eulalio Betancourt MD   Disposable Gloves (VINYL GLOVES MEDIUM) MISC FACILITY REQUESTING ORDER FOR EXAM GLOVES 10/8/20   Eulalio Betancourt MD   Diapers & Supplies MISC USE 1 6 TIMES A DAY 10/8/20   Eulalio Betancourt MD   Clotrimazole 1 % OINT Apply 1 each topically 2 times daily as needed (rash) 8/31/20   Eulalio Betancourt MD   loperamide (RA ANTI-DIARRHEAL) 2 MG capsule Take 1 capsule by mouth 2 times daily as needed for Diarrhea 5/12/20   Eulalio Betancourt MD   Calcium-Vitamin D-Vitamin K (VIACTIV CALCIUM PLUS D) 235-01.8-60 MG-MCG-MCG CHEW Take 1 tablet by mouth daily 4/2/20   Eulalio Betancourt MD   acetaminophen (APAP EXTRA STRENGTH) 500 MG tablet Take 2 tablets by mouth 3 times daily 4/22/19   Alexandru Moscoso MD   EPIPEN 2-JAG 0.3 MG/0.3ML SOAJ injection USE AS DRECTED. TAKE WITH ON ALL OUTINGS. 6/5/17   Eulalio Betancourt MD   BANOPHEN 25 MG tablet TAKE 1 TABLET BY MOUTH NIGHTLY (DIPHENHYDRAMINE) 4/13/16   Eulalio Betancourt MD   triamcinolone (KENALOG) 0.1 % cream Apply topically 2 times daily. 10/16/15   Alexandru Moscoso MD   polyethylene glycol (GLYCOLAX) powder Take 17 g by mouth nightly.     Historical Provider, MD        Allergies   Bee venom, Shellfish-derived products, Amoxil [amoxicillin], Bactrim [sulfamethoxazole-trimethoprim], Codeine, and Strawberry flavor    Social History     Social History     Tobacco History     Smoking Status  Never Smoker    Smokeless Tobacco Use  Never Used          Alcohol History     Alcohol Use Status  No          Drug Use     Drug Use Status  No          Sexual Activity     Sexually Active  Never                Family History Family History   Problem Relation Age of Onset    Colon Cancer Paternal Grandfather        Review of Systems   Review of Systems   Genitourinary: Positive for difficulty urinating and vaginal bleeding. Psychiatric/Behavioral: Positive for behavioral problems. The patient is nervous/anxious. All other systems reviewed and are negative. Physical Exam   Physical Exam   /66 (Site: Right Lower Arm, Position: Sitting, Cuff Size: Large Adult)   Pulse 69   Temp 96.6 °F (35.9 °C) (Oral)   Resp 17   Ht 5' 9\" (1.753 m)   Wt 204 lb (92.5 kg)   Breastfeeding No   BMI 30.13 kg/m²   WDWN in NAD  A and O x 3  HEENT-EOMI, mmm  CAR-RRR  Lungs-CTA  ABD-soft, NT, ND, no hsm  EXT-no c/c/e, no cords, NT  Neuro-grossly intact  PV-nl efg, Normal urethral meatus, nl urethra, nl bladder. , nl cervix, nl vagina,  Nl adnexa with no masses, NT, but limited exam due to mental disabilties    Labs    No results found for this or any previous visit (from the past 24 hour(s)).      Imaging/Diagnostics Last 24 Hours   CT ABDOMEN PELVIS WO CONTRAST Additional Contrast? Radiologist Recommendation (see below)  Status: Final result   Order Providers    Authorizing Encounter Billing   Mague Parker MD [facility] CT RM 2 Ajay Pepe MD   Replaced: CT ABDOMEN PELVIS W IV CONTRAST Additional Contrast? Radiologist Recommendation (see below)          Signed by    Signed Date/Time Phone Pager   Tracy Kearns 4/26/2021  4:23 [phone]    Reading Providers    Read Date Phone Pager   Grabiel Vida Apr 26, 2021  4:23 [phone]    All Reviewers List    Antonina Davis [facility] [place] on 7/13/2021 09:23   Antonina Davis [facility] [place] on 7/13/2021 09:20   Mague Parker MD on 7/12/2021 20:15     CT ABDOMEN PELVIS WO CONTRAST Additional Contrast? Radiologist Recommendation (see below): Result Notes     Cassie Meyers MD   7/12/2021  8:15 PM EDT       Has surgery 7/16/21-can close    Slava PedersonAntonina [facility] [place]   7/12/2021  9:56 AM EDT       Please review and advise when this can be closed     Mojgan Field MD   6/2/2021 11:01 PM EDT       Keep open-we are working on this to schedule her surgery.           Routing History    Priority Sent On From To Message Type    7/12/2021  8:15 PM Suzanne Briseno MD [facility] [address] Support Result Notes    7/12/2021  9:56 AM TAWANNA Vaughn MD Result Notes    6/2/2021 11:01 PM Suzanne [facility]iseno [facility] Result Notes    4/26/2021  4:25 PM Nam, [facility] Incoming Radiology Results From Isabela Soto MD Results   Radiation Dose Estimates    No radiation information found for this patient   Narrative   CT abdomen and pelvis without contrast       HISTORY: Vaginal bleeding   COMPARISON: none   CONTRAST:  None. Unable to establish IV access.     TECHNIQUE: Individualized dose optimization technique was used in order to meet ALARA standards for radiation dose reduction. In addition to vendor specific dose reduction algorithms, the dose reduction techniques vary based on the specific scanner    utilized but frequently include automated exposure control, adjustment of the mA and/or kV according to patient size, and use of iterative reconstruction technique.       COMMENTS:        Degenerative changes in the spine. Severe degenerative changes in the left hip joint. Mild subpleural atelectasis in the lung bases. Anatomic variant retroaortic left renal vein is incidentally noted. Small hiatal hernia. Gallbladder is within normal    limits. Solid abdominal organs are unremarkable. Small amount of air is seen in the bladder; correlate with recent catheterization. Alternately, this may represent a vesicovaginal or other fistula. Uterus is surgically absent. No lymphadenopathy.           Impression       No vaginal mass identified.       Small amount of air in the bladder; in the setting of vaginal bleeding, vesical fistula is a consideration.  Correlate with recent catheterization of the bladder.                       Order History    Open Order Details   PACS Images     Show images for CT ABDOMEN PELVIS WO CONTRAST Additional Contrast? Radiologist Recommendation (see below)   Results History Report    View Report   Associated Diagnoses    Vaginal bleeding       Autism       External Result Report    External Result Report   Existing Charges    Charge Line Charge Code Status Charge Trigger Charge Type   [id number]  Ct Abd/pelvis W/o Contrast [[id number]] [address] [facility] Billing Imaging end exam Technical   [id number]  Radiology Clinical Decision Support Mechanism Agustin Bartholomew [[id number]] [address] [facility] Billing Imaging end exam Technical   [id number] [facility] AND PELVIS,W/O CONTRAST [address] [facility] [facility]) Imaging result study Professional   [id number] [facility] deandra Casas [address] [facility]) Imaging result study Professional   Order Report     Order Details      Assessment    Patient is a 60year old F with autism and mental disabilities. For exam under anesthesia and autism. ? Vaginal bleeding. Only abnormality seen on CT ? Air in bladder. Plan   For exam under anesthesia and cystoscopy.    Consultations Ordered:  None    Electronically signed by Graham Campbell MD on 7/15/21 at 9:33 AM EDT

## 2021-07-16 ENCOUNTER — TELEPHONE (OUTPATIENT)
Dept: GYNECOLOGY | Age: 56
End: 2021-07-16

## 2021-07-16 ENCOUNTER — HOSPITAL ENCOUNTER (OUTPATIENT)
Age: 56
Setting detail: OUTPATIENT SURGERY
Discharge: HOME | End: 2021-07-16
Attending: UROLOGY | Admitting: UROLOGY
Payer: MEDICARE

## 2021-07-16 ENCOUNTER — ANESTHESIA (OUTPATIENT)
Dept: OPERATING ROOM | Age: 56
End: 2021-07-16
Payer: MEDICARE

## 2021-07-16 VITALS
RESPIRATION RATE: 18 BRPM | WEIGHT: 240 LBS | OXYGEN SATURATION: 96 % | TEMPERATURE: 96.4 F | SYSTOLIC BLOOD PRESSURE: 113 MMHG | HEIGHT: 69 IN | BODY MASS INDEX: 35.55 KG/M2 | DIASTOLIC BLOOD PRESSURE: 65 MMHG | HEART RATE: 76 BPM

## 2021-07-16 VITALS
RESPIRATION RATE: 2 BRPM | DIASTOLIC BLOOD PRESSURE: 72 MMHG | SYSTOLIC BLOOD PRESSURE: 124 MMHG | OXYGEN SATURATION: 82 % | TEMPERATURE: 96.8 F

## 2021-07-16 PROCEDURE — 7100000010 HC PHASE II RECOVERY - FIRST 15 MIN: Performed by: UROLOGY

## 2021-07-16 PROCEDURE — 7100000001 HC PACU RECOVERY - ADDTL 15 MIN: Performed by: UROLOGY

## 2021-07-16 PROCEDURE — 6360000002 HC RX W HCPCS: Performed by: NURSE ANESTHETIST, CERTIFIED REGISTERED

## 2021-07-16 PROCEDURE — 88341 IMHCHEM/IMCYTCHM EA ADD ANTB: CPT

## 2021-07-16 PROCEDURE — 2500000003 HC RX 250 WO HCPCS: Performed by: NURSE ANESTHETIST, CERTIFIED REGISTERED

## 2021-07-16 PROCEDURE — 2709999900 HC NON-CHARGEABLE SUPPLY: Performed by: UROLOGY

## 2021-07-16 PROCEDURE — 3700000001 HC ADD 15 MINUTES (ANESTHESIA): Performed by: UROLOGY

## 2021-07-16 PROCEDURE — 3600000012 HC SURGERY LEVEL 2 ADDTL 15MIN: Performed by: UROLOGY

## 2021-07-16 PROCEDURE — 88305 TISSUE EXAM BY PATHOLOGIST: CPT

## 2021-07-16 PROCEDURE — 88175 CYTOPATH C/V AUTO FLUID REDO: CPT

## 2021-07-16 PROCEDURE — 3600000002 HC SURGERY LEVEL 2 BASE: Performed by: UROLOGY

## 2021-07-16 PROCEDURE — 7100000000 HC PACU RECOVERY - FIRST 15 MIN: Performed by: UROLOGY

## 2021-07-16 PROCEDURE — 3700000000 HC ANESTHESIA ATTENDED CARE: Performed by: UROLOGY

## 2021-07-16 PROCEDURE — 2580000003 HC RX 258: Performed by: ANESTHESIOLOGY

## 2021-07-16 PROCEDURE — 6360000002 HC RX W HCPCS: Performed by: UROLOGY

## 2021-07-16 PROCEDURE — 7100000011 HC PHASE II RECOVERY - ADDTL 15 MIN: Performed by: UROLOGY

## 2021-07-16 PROCEDURE — 88360 TUMOR IMMUNOHISTOCHEM/MANUAL: CPT

## 2021-07-16 PROCEDURE — 88342 IMHCHEM/IMCYTCHM 1ST ANTB: CPT

## 2021-07-16 RX ORDER — FENTANYL CITRATE 50 UG/ML
50 INJECTION, SOLUTION INTRAMUSCULAR; INTRAVENOUS EVERY 5 MIN PRN
Status: DISCONTINUED | OUTPATIENT
Start: 2021-07-16 | End: 2021-07-16 | Stop reason: HOSPADM

## 2021-07-16 RX ORDER — CIPROFLOXACIN 250 MG/1
250 TABLET, FILM COATED ORAL 2 TIMES DAILY
Qty: 10 TABLET | Refills: 0 | Status: SHIPPED | OUTPATIENT
Start: 2021-07-16 | End: 2021-07-19 | Stop reason: ALTCHOICE

## 2021-07-16 RX ORDER — CIPROFLOXACIN 2 MG/ML
400 INJECTION, SOLUTION INTRAVENOUS ONCE
Status: COMPLETED | OUTPATIENT
Start: 2021-07-16 | End: 2021-07-16

## 2021-07-16 RX ORDER — SODIUM CHLORIDE 9 MG/ML
25 INJECTION, SOLUTION INTRAVENOUS PRN
Status: DISCONTINUED | OUTPATIENT
Start: 2021-07-16 | End: 2021-07-16 | Stop reason: HOSPADM

## 2021-07-16 RX ORDER — OXYCODONE HYDROCHLORIDE 10 MG/1
10 TABLET ORAL PRN
Status: DISCONTINUED | OUTPATIENT
Start: 2021-07-16 | End: 2021-07-16 | Stop reason: HOSPADM

## 2021-07-16 RX ORDER — MORPHINE SULFATE 2 MG/ML
1 INJECTION, SOLUTION INTRAMUSCULAR; INTRAVENOUS EVERY 5 MIN PRN
Status: DISCONTINUED | OUTPATIENT
Start: 2021-07-16 | End: 2021-07-16 | Stop reason: HOSPADM

## 2021-07-16 RX ORDER — ONDANSETRON 2 MG/ML
INJECTION INTRAMUSCULAR; INTRAVENOUS PRN
Status: DISCONTINUED | OUTPATIENT
Start: 2021-07-16 | End: 2021-07-16 | Stop reason: SDUPTHER

## 2021-07-16 RX ORDER — SODIUM CHLORIDE 9 MG/ML
INJECTION, SOLUTION INTRAVENOUS CONTINUOUS
Status: DISCONTINUED | OUTPATIENT
Start: 2021-07-16 | End: 2021-07-16 | Stop reason: HOSPADM

## 2021-07-16 RX ORDER — FENTANYL CITRATE 50 UG/ML
25 INJECTION, SOLUTION INTRAMUSCULAR; INTRAVENOUS EVERY 5 MIN PRN
Status: DISCONTINUED | OUTPATIENT
Start: 2021-07-16 | End: 2021-07-16 | Stop reason: HOSPADM

## 2021-07-16 RX ORDER — MORPHINE SULFATE 2 MG/ML
2 INJECTION, SOLUTION INTRAMUSCULAR; INTRAVENOUS EVERY 5 MIN PRN
Status: DISCONTINUED | OUTPATIENT
Start: 2021-07-16 | End: 2021-07-16 | Stop reason: HOSPADM

## 2021-07-16 RX ORDER — OXYCODONE HYDROCHLORIDE 5 MG/1
5 TABLET ORAL PRN
Status: DISCONTINUED | OUTPATIENT
Start: 2021-07-16 | End: 2021-07-16 | Stop reason: HOSPADM

## 2021-07-16 RX ORDER — SODIUM CHLORIDE 0.9 % (FLUSH) 0.9 %
10 SYRINGE (ML) INJECTION EVERY 12 HOURS SCHEDULED
Status: DISCONTINUED | OUTPATIENT
Start: 2021-07-16 | End: 2021-07-16 | Stop reason: HOSPADM

## 2021-07-16 RX ORDER — PHENYLEPHRINE HCL IN 0.9% NACL 1 MG/10 ML
SYRINGE (ML) INTRAVENOUS PRN
Status: DISCONTINUED | OUTPATIENT
Start: 2021-07-16 | End: 2021-07-16 | Stop reason: SDUPTHER

## 2021-07-16 RX ORDER — LIDOCAINE HYDROCHLORIDE 20 MG/ML
INJECTION, SOLUTION EPIDURAL; INFILTRATION; INTRACAUDAL; PERINEURAL PRN
Status: DISCONTINUED | OUTPATIENT
Start: 2021-07-16 | End: 2021-07-16 | Stop reason: SDUPTHER

## 2021-07-16 RX ORDER — SODIUM CHLORIDE 0.9 % (FLUSH) 0.9 %
10 SYRINGE (ML) INJECTION PRN
Status: DISCONTINUED | OUTPATIENT
Start: 2021-07-16 | End: 2021-07-16 | Stop reason: HOSPADM

## 2021-07-16 RX ORDER — MEPERIDINE HYDROCHLORIDE 25 MG/ML
12.5 INJECTION INTRAMUSCULAR; INTRAVENOUS; SUBCUTANEOUS EVERY 5 MIN PRN
Status: DISCONTINUED | OUTPATIENT
Start: 2021-07-16 | End: 2021-07-16 | Stop reason: HOSPADM

## 2021-07-16 RX ORDER — PROPOFOL 10 MG/ML
INJECTION, EMULSION INTRAVENOUS PRN
Status: DISCONTINUED | OUTPATIENT
Start: 2021-07-16 | End: 2021-07-16 | Stop reason: SDUPTHER

## 2021-07-16 RX ORDER — DEXAMETHASONE SODIUM PHOSPHATE 4 MG/ML
INJECTION, SOLUTION INTRA-ARTICULAR; INTRALESIONAL; INTRAMUSCULAR; INTRAVENOUS; SOFT TISSUE PRN
Status: DISCONTINUED | OUTPATIENT
Start: 2021-07-16 | End: 2021-07-16 | Stop reason: SDUPTHER

## 2021-07-16 RX ORDER — KETOROLAC TROMETHAMINE 30 MG/ML
INJECTION, SOLUTION INTRAMUSCULAR; INTRAVENOUS PRN
Status: DISCONTINUED | OUTPATIENT
Start: 2021-07-16 | End: 2021-07-16 | Stop reason: SDUPTHER

## 2021-07-16 RX ORDER — ONDANSETRON 2 MG/ML
4 INJECTION INTRAMUSCULAR; INTRAVENOUS
Status: DISCONTINUED | OUTPATIENT
Start: 2021-07-16 | End: 2021-07-16 | Stop reason: HOSPADM

## 2021-07-16 RX ADMIN — SODIUM CHLORIDE: 9 INJECTION, SOLUTION INTRAVENOUS at 06:47

## 2021-07-16 RX ADMIN — CIPROFLOXACIN 400 MG: 2 INJECTION, SOLUTION INTRAVENOUS at 06:54

## 2021-07-16 RX ADMIN — DEXAMETHASONE SODIUM PHOSPHATE 4 MG: 4 INJECTION, SOLUTION INTRAMUSCULAR; INTRAVENOUS at 07:41

## 2021-07-16 RX ADMIN — FAMOTIDINE 20 MG: 10 INJECTION, SOLUTION INTRAVENOUS at 07:41

## 2021-07-16 RX ADMIN — Medication 100 MCG: at 08:06

## 2021-07-16 RX ADMIN — PROPOFOL 150 MG: 10 INJECTION, EMULSION INTRAVENOUS at 07:34

## 2021-07-16 RX ADMIN — PROPOFOL 50 MG: 10 INJECTION, EMULSION INTRAVENOUS at 07:48

## 2021-07-16 RX ADMIN — PROPOFOL 50 MG: 10 INJECTION, EMULSION INTRAVENOUS at 07:36

## 2021-07-16 RX ADMIN — LIDOCAINE HYDROCHLORIDE 100 MG: 20 INJECTION, SOLUTION EPIDURAL; INFILTRATION; INTRACAUDAL; PERINEURAL at 07:34

## 2021-07-16 RX ADMIN — Medication 100 MCG: at 07:56

## 2021-07-16 RX ADMIN — PROPOFOL 20 MG: 10 INJECTION, EMULSION INTRAVENOUS at 08:12

## 2021-07-16 RX ADMIN — ONDANSETRON 4 MG: 2 INJECTION INTRAMUSCULAR; INTRAVENOUS at 07:41

## 2021-07-16 RX ADMIN — Medication 100 MCG: at 07:48

## 2021-07-16 RX ADMIN — KETOROLAC TROMETHAMINE 30 MG: 30 INJECTION, SOLUTION INTRAMUSCULAR at 08:12

## 2021-07-16 ASSESSMENT — PULMONARY FUNCTION TESTS
PIF_VALUE: 9
PIF_VALUE: 4
PIF_VALUE: 4
PIF_VALUE: 6
PIF_VALUE: 11
PIF_VALUE: 17
PIF_VALUE: 8
PIF_VALUE: 9
PIF_VALUE: 17
PIF_VALUE: 9
PIF_VALUE: 9
PIF_VALUE: 1
PIF_VALUE: 9
PIF_VALUE: 4
PIF_VALUE: 4
PIF_VALUE: 5
PIF_VALUE: 4
PIF_VALUE: 5
PIF_VALUE: 0
PIF_VALUE: 10
PIF_VALUE: 9
PIF_VALUE: 4
PIF_VALUE: 3
PIF_VALUE: 6
PIF_VALUE: 4
PIF_VALUE: 5
PIF_VALUE: 6
PIF_VALUE: 4
PIF_VALUE: 0
PIF_VALUE: 17
PIF_VALUE: 5
PIF_VALUE: 4
PIF_VALUE: 9
PIF_VALUE: 5
PIF_VALUE: 9
PIF_VALUE: 4
PIF_VALUE: 1
PIF_VALUE: 4
PIF_VALUE: 8
PIF_VALUE: 4
PIF_VALUE: 9
PIF_VALUE: 8
PIF_VALUE: 16
PIF_VALUE: 1
PIF_VALUE: 9
PIF_VALUE: 13

## 2021-07-16 ASSESSMENT — PAIN SCALES - GENERAL: PAINLEVEL_OUTOF10: 0

## 2021-07-16 ASSESSMENT — PAIN - FUNCTIONAL ASSESSMENT
PAIN_FUNCTIONAL_ASSESSMENT: 0-10
PAIN_FUNCTIONAL_ASSESSMENT: 0-10

## 2021-07-16 NOTE — INTERVAL H&P NOTE
Update History & Physical    The patient's History and Physical was reviewed with the patient and I examined the patient. There was no change. The surgical site was confirmed by the patient and me. Plan: The risks, benefits, expected outcome, and alternative to the recommended procedure have been discussed with the patient. Patient understands and wants to proceed with the procedure.      Electronically signed by Jesus Killian MD on 7/16/2021 at 7:17 AM

## 2021-07-16 NOTE — PROGRESS NOTES
CLINICAL PHARMACY NOTE: MEDS TO BEDS    Total # of Prescriptions Filled: 1   The following medications were delivered to the patient:  Current Discharge Medication List      START taking these medications    Details   ciprofloxacin (CIPRO) 250 MG tablet Take 1 tablet by mouth 2 times daily for 10 doses  Qty: 10 tablet, Refills: 0         ·   ·     Additional Documentation:

## 2021-07-16 NOTE — OP NOTE
[address] [place] [address] [facility]                                OPERATIVE REPORT    PATIENT NAME: Missy Hill                 :        1965  MED REC NO:   [id number]                          ROOM:  ACCOUNT NO:   [de-identified]                           ADMIT DATE: 2021  PROVIDER:     Daniela Johnson MD    DATE OF PROCEDURE:  2021    PREOPERATIVE DIAGNOSES:  Vaginal bleeding, abnormal bladder on CAT scan. POSTOPERATIVE DIAGNOSES:  Vaginal bleeding, abnormal bladder on CAT  scan, with findings of chronic cystitis. OPERATIONS PERFORMED:  Cystoscopy, bladder biopsy and fulguration,  pelvic exam under anesthesia, Pap smear. SURGEON:  Daniela Johnson MD    ANESTHESIA:  General.    COMPLICATIONS:  None. BLOOD LOSS:  Minimal.    INDICATIONS:  A 53-year-old female with severe autism who was found to  have some blood in her pad. The source of the bleeding was unclear. Pelvic exam in the office was impossible due to the patient's discomfort  with the exam.  She did have a CAT scan of the abdomen and pelvis, which  showed some air in the bladder. She is here for cystoscopy and pelvic  exam under anesthesia. OPERATIVE PROCEDURE:  She was given Cipro. She was taken to the  cystoscopy suite. She moved onto the table. Anesthesia was induced. Her position was changed to the lithotomy position. Her genitalia were  prepped and draped. The 21-E[place]ian cystoscope advanced easily through  the urethra into the bladder. A cloudy urine was flushed out of the  bladder. I irrigated the bladder several times. Then, I evaluated the  bladder. Both ureteral orifices were normal in position. The bladder  wall was diffusely erythematous. There were some ulcerated areas on the  anterior bladder wall near the 2 o'clock position consistent with  chronic cystitis.   There was nothing overtly concerning for tumor or  fistula. A cold cup biopsy forceps was used to biopsy the site on the  anterior bladder wall. The Bugbee electrode was then used to fulgurate  the area. At the end of the procedure, there was no active bleeding. The bladder was drained and the scope was withdrawn. A pelvic exam was performed. There was vaginal atrophy and some mild  bleeding with the exam due to the atrophy. There were no overt lesions. A Pap smear was performed with both the spatula and the Cytobrush. The  specimen was passed off the field. At this point, the patient was  awakened and transferred to Recovery, having tolerated the procedure  well. She will be discharged with a prescription for Cipro and she will  follow up based off the biopsy results.         Evelina Gunderson MD    D: 07/16/2021 8:32:56       T: 07/16/2021 8:43:37     [email or web address]  Job#: [id number]     Doc#: [id number]    CC:

## 2021-07-16 NOTE — PROGRESS NOTES
Verbal and written discharge instructions were given to the patient and family, patient and family verbalize understanding of discharge instructions including medications orders for home and possible side effects associated with them. Patient instructed and verbalizes understanding to call the doctor listed if they should have any complications or worsening symptoms. Verbalizes understanding about follow-up appointments. D/C IV patient tolerated well, no signs of bleeding. Patient discharged home with all belongings. Out via wheelchair.

## 2021-07-16 NOTE — BRIEF OP NOTE
Brief Postoperative Note      Patient: Anoop Bishop  YOB: 1965  MRN: [id number]    Date of Procedure: 7/16/2021    Pre-Op Diagnosis: vaginal bleeding, abnormal bladder on CT    Post-Op Diagnosis: cystitis       Procedure(s):  CYSTOSCOPY WITH PELVIC EXAM AND PAPANICOLAOU SMEAR  CYSTOSCOPY BLADDER BIOPSY and fulgeration    Surgeon(s):  Elodia Collins MD    Assistant:  * No surgical staff found *    Anesthesia: General    Estimated Blood Loss (mL): Minimal    Complications: None    Specimens:   ID Type Source Tests Collected by Time Destination   A : A) bladder biopsy Tissue Tissue SURGICAL PATHOLOGY Elodia Collins MD 7/16/2021 1169        Implants:  * No implants in log *      Drains: * No LDAs found *    Findings: diffuse bladder inflammation consistent with cystitis    Electronically signed by Elodia Collins MD on 7/16/2021 at 8:15 AM

## 2021-07-16 NOTE — ANESTHESIA PRE PROCEDURE
tablet Take 2 tablets by mouth 3 times daily 4/22/19  Yes Alexandru Moscoso MD   EPIPEN 2-JAG 0.3 MG/0.3ML SOAJ injection USE AS DRECTED. TAKE WITH ON ALL OUTINGS. 6/5/17  Yes Devin Camacho MD   BANOPHEN 25 MG tablet TAKE 1 TABLET BY MOUTH NIGHTLY (DIPHENHYDRAMINE) 4/13/16  Yes Alexandru Moscoso MD   triamcinolone (KENALOG) 0.1 % cream Apply topically 2 times daily. 10/16/15  Yes Devin Camacho MD   polyethylene glycol (GLYCOLAX) powder Take 17 g by mouth nightly. Yes Historical Provider, MD   ciclopirox (LOPROX) 0.77 % cream APPLY TO TOE WEB 2 TIMES A DAY  Patient taking differently: Apply topically 2 times daily as needed  6/8/21   Devin Camacho MD   Disposable Gloves (VINYL GLOVES MEDIUM) MISC FACILITY REQUESTING ORDER FOR EXAM GLOVES 10/8/20   Devin Camacho MD   Diapers & Supplies MISC USE 1 6 TIMES A DAY 10/8/20   Devin Camacho MD       Current medications:    Current Facility-Administered Medications   Medication Dose Route Frequency Provider Last Rate Last Admin    ciprofloxacin (CIPRO) IVPB 400 mg  400 mg Intravenous Once Art Hill MD        0.9 % sodium chloride infusion   Intravenous Continuous Avila Leggett MD        sodium chloride flush 0.9 % injection 10 mL  10 mL Intravenous 2 times per day Avila Leggett MD        sodium chloride flush 0.9 % injection 10 mL  10 mL Intravenous PRN Avila Leggett MD        0.9 % sodium chloride infusion  25 mL Intravenous PRN Avila Leggett MD           Allergies:     Allergies   Allergen Reactions    Bee Venom Anaphylaxis    Shellfish-Derived Products      Includes Iodine    Amoxil [Amoxicillin] Rash    Bactrim [Sulfamethoxazole-Trimethoprim] Rash    Codeine Rash    Strawberry Flavor Rash       Problem List:    Patient Active Problem List   Diagnosis Code    OCD (obsessive compulsive disorder) F42.9    Autism F84.0    Obesity (BMI 35.0-39.9 without comorbidity) E66.9    Chronic hip pain, left M25.552, G89.29    Dependent edema R60.9    Urge incontinence N39.41       Past Medical History:        Diagnosis Date    Autism     COVID-19     4/2020    Irregular uterine bleeding     Left hip pain     Menopause ovarian failure     Mental retardation     OCD (obsessive compulsive disorder)     Urinary incontinence        Past Surgical History:        Procedure Laterality Date    BREAST BIOPSY      HYSTERECTOMY  1975    HYSTERECTOMY, TOTAL ABDOMINAL         Social History:    Social History     Tobacco Use    Smoking status: Never Smoker    Smokeless tobacco: Never Used   Substance Use Topics    Alcohol use: No                                Counseling given: Not Answered      Vital Signs (Current):   Vitals:    07/14/21 0946 07/16/21 0622 07/16/21 0633   BP:   105/76   Pulse:   70   Resp:   17   Temp:   97.5 °F (36.4 °C)   TempSrc:   Temporal   SpO2:   100%   Weight: 240 lb (108.9 kg) 240 lb (108.9 kg)    Height: 5' 9\" (1.753 m) 5' 9\" (1.753 m)                                               BP Readings from Last 3 Encounters:   07/16/21 105/76   06/04/21 118/82   04/15/21 102/66       NPO Status:                                                                                 BMI:   Wt Readings from Last 3 Encounters:   07/16/21 240 lb (108.9 kg)   06/04/21 218 lb 3.2 oz (99 kg)   04/15/21 204 lb (92.5 kg)     Body mass index is 35.44 kg/m².     CBC:   Lab Results   Component Value Date    WBC 9.2 08/13/2020    RBC 4.61 08/13/2020    HGB 14.1 08/13/2020    HCT 43.3 08/13/2020    MCV 93.8 08/13/2020    RDW 13.1 08/13/2020     08/13/2020       CMP:   Lab Results   Component Value Date     06/04/2021    K 4.6 06/04/2021     06/04/2021    CO2 26 06/04/2021    BUN 30 06/04/2021    CREATININE 1.1 06/04/2021    GFRAA >60 06/04/2021    AGRATIO 1.5 06/04/2021    LABGLOM 51 06/04/2021    GLUCOSE 87 06/04/2021    PROT 7.2 06/04/2021    CALCIUM 9.6 06/04/2021    BILITOT <0.2 06/04/2021    ALKPHOS 220 06/04/2021    AST 28 06/04/2021    ALT 32 06/04/2021       POC Tests: No results for input(s): POCGLU, POCNA, POCK, POCCL, POCBUN, POCHEMO, POCHCT in the last 72 hours. Coags: No results found for: PROTIME, INR, APTT    HCG (If Applicable):   Lab Results   Component Value Date    PREGTESTUR Negative 11/28/2016        ABGs: No results found for: PHART, PO2ART, PBE6KHN, UMK1CKW, BEART, Z6MBVHGK     Type & Screen (If Applicable):  No results found for: LABABO, LABRH    Drug/Infectious Status (If Applicable):  No results found for: HIV, HEPCAB    COVID-19 Screening (If Applicable):   Lab Results   Component Value Date    COVID19 NOT DETECTED 08/25/2020           Anesthesia Evaluation  Patient summary reviewed and Nursing notes reviewed no history of anesthetic complications:   Airway: Mallampati: II  TM distance: >3 FB   Neck ROM: full  Mouth opening: > = 3 FB Dental: normal exam         Pulmonary:Negative Pulmonary ROS breath sounds clear to auscultation                            ROS comment: Covid in April 2020   Cardiovascular:        (-) pacemaker, hypertension, valvular problems/murmurs, past MI, CAD, CABG/stent, dysrhythmias,  angina,  CHF, orthopnea and no pulmonary hypertension      Rhythm: regular                      Neuro/Psych:   (+) psychiatric history:             ROS comment: autism GI/Hepatic/Renal:        (-) hiatal hernia, GERD, PUD, hepatitis, liver disease, bowel prep and no morbid obesity       Endo/Other:        (-) diabetes mellitus, hypothyroidism, hyperthyroidism, blood dyscrasia, arthritis, no electrolyte abnormalities               Abdominal:             Vascular:           ROS comment: Dependent edema. Other Findings:           Anesthesia Plan      general     ASA 3     (Spoke with her sister Parrish Rubio)  Induction: intravenous. MIPS: Prophylactic antiemetics administered. Anesthetic plan and risks discussed with patient and healthcare power of .       Plan discussed with Rebecca Contreras MD   7/16/2021        This pre-anesthesia assessment may be used as a history and physical.    DOS STAFF ADDENDUM:    Pt seen and examined, chart reviewed (including anesthesia, drug and allergy history). No interval changes to history and physical examination. Anesthetic plan, risks, benefits, alternatives, and personnel involved discussed with patient. Patient verbalized an understanding and agrees to proceed.       Kylee Wells MD  July 16, 2021  6:35 AM

## 2021-07-16 NOTE — ANESTHESIA POSTPROCEDURE EVALUATION
Department of Anesthesiology  Postprocedure Note    Patient: Aby Griffith  MRN: [id number]  YOB: 1965  Date of evaluation: 7/16/2021  Time:  12:36 PM     Procedure Summary     Date: 07/16/21 Room / Location: [facility] [place] [address] [place] [address]    Anesthesia Start: 0730 Anesthesia Stop: 9912    Procedures:       CYSTOSCOPY WITH PELVIC EXAM AND PAPANICOLAOU SMEAR (N/A )      CYSTOSCOPY BLADDER BIOPSY and fulgeration (N/A ) Diagnosis: (AIR IN BLADDER)    Surgeons: Kourtney Hogan MD Responsible Provider: Deborah Grey MD    Anesthesia Type: general ASA Status: 3          Anesthesia Type: general    Marcia Phase I: Marcia Score: 10    Marcia Phase II: Marcia Score: 10    Last vitals: Reviewed and per EMR flowsheets. Anesthesia Post Evaluation    Patient location during evaluation: PACU  Note status: non verbal patient.   Level of consciousness: awake  Pain score: 0  Airway patency: patent  Nausea & Vomiting: no nausea and no vomiting  Complications: no  Cardiovascular status: blood pressure returned to baseline  Respiratory status: acceptable  Hydration status: euvolemic

## 2021-07-16 NOTE — PROGRESS NOTES
Pt on ra wob wnl, sats 97% on ra. Pt noted with non verbal autisum needing frequent redirection. Scribe Attestation (For Scribes USE Only)... Attending Attestation (For Attendings USE Only).../Scribe Attestation (For Scribes USE Only)...

## 2021-07-16 NOTE — PROGRESS NOTES
Patient arrived from PACU alert and oriented, vss, no complaints of pain. Patient is baseline non-verbal autism.

## 2021-07-19 ENCOUNTER — TELEPHONE (OUTPATIENT)
Dept: INTERNAL MEDICINE CLINIC | Age: 56
End: 2021-07-19

## 2021-07-19 ENCOUNTER — OFFICE VISIT (OUTPATIENT)
Dept: INTERNAL MEDICINE CLINIC | Age: 56
End: 2021-07-19
Payer: MEDICARE

## 2021-07-19 VITALS
SYSTOLIC BLOOD PRESSURE: 110 MMHG | HEART RATE: 76 BPM | HEIGHT: 69 IN | OXYGEN SATURATION: 98 % | DIASTOLIC BLOOD PRESSURE: 64 MMHG | WEIGHT: 218 LBS | RESPIRATION RATE: 16 BRPM | BODY MASS INDEX: 32.29 KG/M2

## 2021-07-19 DIAGNOSIS — E66.9 OBESITY (BMI 35.0-39.9 WITHOUT COMORBIDITY): ICD-10-CM

## 2021-07-19 DIAGNOSIS — R19.7 DIARRHEA, UNSPECIFIED TYPE: ICD-10-CM

## 2021-07-19 DIAGNOSIS — F42.2 MIXED OBSESSIONAL THOUGHTS AND ACTS: ICD-10-CM

## 2021-07-19 DIAGNOSIS — F84.0 AUTISM: Primary | ICD-10-CM

## 2021-07-19 PROCEDURE — 1036F TOBACCO NON-USER: CPT | Performed by: INTERNAL MEDICINE

## 2021-07-19 PROCEDURE — 3017F COLORECTAL CA SCREEN DOC REV: CPT | Performed by: INTERNAL MEDICINE

## 2021-07-19 PROCEDURE — 99204 OFFICE O/P NEW MOD 45 MIN: CPT | Performed by: INTERNAL MEDICINE

## 2021-07-19 PROCEDURE — G8417 CALC BMI ABV UP PARAM F/U: HCPCS | Performed by: INTERNAL MEDICINE

## 2021-07-19 PROCEDURE — G8427 DOCREV CUR MEDS BY ELIG CLIN: HCPCS | Performed by: INTERNAL MEDICINE

## 2021-07-19 SDOH — ECONOMIC STABILITY: INCOME INSECURITY: IN THE LAST 12 MONTHS, WAS THERE A TIME WHEN YOU WERE NOT ABLE TO PAY THE MORTGAGE OR RENT ON TIME?: NO

## 2021-07-19 SDOH — ECONOMIC STABILITY: FOOD INSECURITY: WITHIN THE PAST 12 MONTHS, YOU WORRIED THAT YOUR FOOD WOULD RUN OUT BEFORE YOU GOT MONEY TO BUY MORE.: NEVER TRUE

## 2021-07-19 SDOH — ECONOMIC STABILITY: HOUSING INSECURITY
IN THE LAST 12 MONTHS, WAS THERE A TIME WHEN YOU DID NOT HAVE A STEADY PLACE TO SLEEP OR SLEPT IN A SHELTER (INCLUDING NOW)?: NO

## 2021-07-19 SDOH — ECONOMIC STABILITY: FOOD INSECURITY: WITHIN THE PAST 12 MONTHS, THE FOOD YOU BOUGHT JUST DIDN'T LAST AND YOU DIDN'T HAVE MONEY TO GET MORE.: NEVER TRUE

## 2021-07-19 ASSESSMENT — SOCIAL DETERMINANTS OF HEALTH (SDOH)
WITHIN THE LAST YEAR, HAVE YOU BEEN KICKED, HIT, SLAPPED, OR OTHERWISE PHYSICALLY HURT BY YOUR PARTNER OR EX-PARTNER?: NO
HOW HARD IS IT FOR YOU TO PAY FOR THE VERY BASICS LIKE FOOD, HOUSING, MEDICAL CARE, AND HEATING?: NOT HARD AT ALL
WITHIN THE LAST YEAR, HAVE YOU BEEN HUMILIATED OR EMOTIONALLY ABUSED IN OTHER WAYS BY YOUR PARTNER OR EX-PARTNER?: NO
WITHIN THE LAST YEAR, HAVE YOU BEEN AFRAID OF YOUR PARTNER OR EX-PARTNER?: NO
WITHIN THE LAST YEAR, HAVE TO BEEN RAPED OR FORCED TO HAVE ANY KIND OF SEXUAL ACTIVITY BY YOUR PARTNER OR EX-PARTNER?: NO

## 2021-07-19 NOTE — PROGRESS NOTES
TAKE ONE TABLET BY MOUTH ONCE A DAY. 30 tablet 11    sertraline (ZOLOFT) 50 MG tablet TAKE ONE TABLET BY MOUTH ONCE A DAY. 30 tablet 11    Disposable Gloves (VINYL GLOVES MEDIUM) MISC FACILITY REQUESTING ORDER FOR EXAM GLOVES 100 each 5    Diapers & Supplies MISC USE 1 6 TIMES A  each 3    Clotrimazole 1 % OINT Apply 1 each topically 2 times daily as needed (rash) 60 g 1    loperamide (RA ANTI-DIARRHEAL) 2 MG capsule Take 1 capsule by mouth 2 times daily as needed for Diarrhea 20 capsule 1    Calcium-Vitamin D-Vitamin K (VIACTIV CALCIUM PLUS D) 650-12.5-40 MG-MCG-MCG CHEW Take 1 tablet by mouth daily 30 tablet 11    acetaminophen (APAP EXTRA STRENGTH) 500 MG tablet Take 2 tablets by mouth 3 times daily 270 tablet 5    EPIPEN 2-JAG 0.3 MG/0.3ML SOAJ injection USE AS DRECTED. TAKE WITH ON ALL OUTINGS. 2 each 3    BANOPHEN 25 MG tablet TAKE 1 TABLET BY MOUTH NIGHTLY (DIPHENHYDRAMINE) 30 tablet 2    triamcinolone (KENALOG) 0.1 % cream Apply topically 2 times daily. 30 g 1    polyethylene glycol (GLYCOLAX) powder Take 17 g by mouth nightly.           Allergies   Allergen Reactions    Bee Venom Anaphylaxis    Shellfish-Derived Products      Includes Iodine    Amoxil [Amoxicillin] Rash    Bactrim [Sulfamethoxazole-Trimethoprim] Rash    Codeine Rash    Strawberry Flavor Rash       Past Medical History:   Diagnosis Date    Autism     COVID-19     4/2020    Irregular uterine bleeding     Left hip pain     Menopause ovarian failure     Mental retardation     OCD (obsessive compulsive disorder)     Urinary incontinence        Past Surgical History:   Procedure Laterality Date    BREAST BIOPSY      CYSTOSCOPY N/A 7/16/2021    CYSTOSCOPY WITH PELVIC EXAM AND PAPANICOLAOU SMEAR performed by Addis Moseley MD at [address] [place] [address] N/A 7/16/2021    CYSTOSCOPY BLADDER BIOPSY and fulgeration performed by Addis Moseley MD at [facility] HYSTERECTOMY, TOTAL ABDOMINAL         Social History     Socioeconomic History    Marital status: Single     Spouse name: Not on file    Number of children: 0    Years of education: Not on file    Highest education level: Not on file   Occupational History    Occupation: disability    Tobacco Use    Smoking status: Never Smoker    Smokeless tobacco: Never Used   Vaping Use    Vaping Use: Never used   Substance and Sexual Activity    Alcohol use: No    Drug use: No    Sexual activity: Never   Other Topics Concern    Not on file   Social History Narrative    Not on file     Social Determinants of Health     Financial Resource Strain: Low Risk     Difficulty of Paying Living Expenses: Not hard at all   Food Insecurity: No Food Insecurity    Worried About [address] in the Last Year: Never true    Krista of Food in the Last Year: Never true   Transportation Needs: No Transportation Needs    Lack of Transportation (Medical): No    Lack of Transportation (Non-Medical): No   Physical Activity:     Days of Exercise per Week:     Minutes of Exercise per Session:    Stress: No Stress Concern Present    Feeling of Stress : Not at all   Social Connections:     Frequency of Communication with Friends and Family:     Frequency of Social Gatherings with Friends and Family:     Attends Congregation Services:     Active Member of Clubs or Organizations:     Attends Club or Organization Meetings:     Marital Status:    Intimate Partner Violence: Not At Risk    Fear of Current or Ex-Partner: No    Emotionally Abused: No    Physically Abused: No    Sexually Abused: No        Family History   Problem Relation Age of Onset    Colon Cancer Paternal Grandfather          OBJECTIVE:    Vitals:    07/19/21 1020   BP: 110/64   Pulse: 76   Resp: 16   SpO2: 98%   Weight: 218 lb (98.9 kg)   Height: 5' 9\" (1.753 m)     Body mass index is 32.19 kg/m².       Physical Exam  Constitutional:       General: She is not in acute distress. Appearance: She is not ill-appearing. Comments: Patient wheelchair-bound   HENT:      Head: Normocephalic and atraumatic. Right Ear: External ear normal.      Left Ear: External ear normal.   Eyes:      General: No scleral icterus. Extraocular Movements: Extraocular movements intact. Conjunctiva/sclera: Conjunctivae normal.   Cardiovascular:      Rate and Rhythm: Normal rate and regular rhythm. Pulses: Normal pulses. Heart sounds: No murmur heard. No friction rub. No gallop. Pulmonary:      Effort: Pulmonary effort is normal. No respiratory distress. Breath sounds: Normal breath sounds. No wheezing, rhonchi or rales. Abdominal:      General: Bowel sounds are normal. There is no distension. Palpations: Abdomen is soft. There is no mass. Tenderness: There is no abdominal tenderness. There is no guarding or rebound. Musculoskeletal:         General: No swelling. Cervical back: Normal range of motion. No muscular tenderness. Skin:     General: Skin is warm. Coloration: Skin is not jaundiced. Findings: No bruising, erythema or rash. Neurological:      General: No focal deficit present. Comments: Patient wheelchair-bound   Psychiatric:      Comments: And seems agitated. Patient is nonverbal.  Multiple repetitive movements         ASSESSMENT/PLAN:  1. Autism  Patient nonverbal.  Has followed with psychiatry in the past however has not recently  -Plan to get patient in with a new psychiatrist whom she can follow with and can continue to prescribe medications  -Continue current medications    2. Mixed obsessional thoughts and acts  Patient seems agitated today in the office. Patient doing multiple repetitive movements. Patient on medication however has not seen psychiatry in a long time. We will get her set up with a new psychiatrist.  -Continue current medications    3.  Diarrhea, unspecified type  Patient sister notes that group home has noted bouts of diarrhea that are intermittent. I have asked patient sister to ask the group home to note the times that diarrhea and constipation have been unclear if this is diarrhea secondary to constipation or if constipation is from Imodium use secondary to diarrhea. We will plan to follow-up in 3 months    4. obesity   BMI 32. Patient due for laboratory work however patient is very agitated in the office today. We will plan to do lab work in 3 months. Return in about 3 months (around 10/19/2021).      The DO Jeremy

## 2021-07-19 NOTE — TELEPHONE ENCOUNTER
Chris [facility] Transitions Initial Follow Up Call    Outreach made within 2 business days of discharge: Yes    Patient: Daniel Yepez Patient : 1965   MRN: <[id number]>  Reason for Admission: There are no discharge diagnoses documented for the most recent discharge.   Discharge Date: 21       Spoke with: Patient has appointment with new PCP today,     Discharge department/facility: [facility]    Scheduled appointment with PCP within 7-14 days    Follow Up  Future Appointments   Date Time Provider Virginia Garcia   2021 10:30 AM Shaan Zuniga MD [facility]   10/18/2021 10:30 AM Mary Ramos DO [address] [place], [place]

## 2021-07-19 NOTE — PATIENT INSTRUCTIONS
[facility] [address], [place]  ii. [phone]  iii. [email or web address]. [place]    o [facility]  i. Accept most insurances, but not medicaid or medicare  ii. [address], [address]  iii. [phone]  iv. [email or web address]. [email or web address]    o [employer]  i. Accepts most insurances  ii. Many locations  iii. [phone]  iv. [email or web address]  v. Recommend scheduling online because telephone can have long wait times  o [email or web address]  i.  Can utilize this website and filter by location and insurance

## 2021-07-20 ENCOUNTER — TELEPHONE (OUTPATIENT)
Dept: GYNECOLOGY | Age: 56
End: 2021-07-20

## 2021-07-20 NOTE — TELEPHONE ENCOUNTER
Called and spoke to Emily Mckeon at [phone] relate message to Emily Mckeon in to patient need her follow up with Dr Vergara Crimes. Emily Mckeon stated she was driving and said she will call our office back to get phone number she needs to call.

## 2021-07-20 NOTE — TELEPHONE ENCOUNTER
Group home for patient called (didn't give number)     Wanted to update   Patient has post ops appt on 7/29/2021. She actually went to see Dr Francie Willingham yesterday and kind of freaked out. Was told that she might need some volume for her next appointment.

## 2021-07-20 NOTE — TELEPHONE ENCOUNTER
Tell home that she does not need post op with me-she needs to do post op with Dr. Izzy Kramer [phone] since I did not do her surgery.

## 2021-08-19 ENCOUNTER — TELEPHONE (OUTPATIENT)
Dept: INTERNAL MEDICINE CLINIC | Age: 56
End: 2021-08-19

## 2021-08-19 NOTE — TELEPHONE ENCOUNTER
Spoke with Betty Tsang @ [facility]. Advised her you were out of the office this week and that her old PCP Alexandru Moscoso MD was the original prescriber of this medication and she is going to speak with her nurses and see which medication they have been giving patient. She is curious if they are actually giving her both creams or just using one or the other and signing off on both. She will verify this and will call back.  This is an FYI

## 2021-08-19 NOTE — TELEPHONE ENCOUNTER
Melquiades Vázquez is requesting clarification of patient's medications. Which medication needs be used for patient's athletes foots.      clotrimazole-betamethasone (LOTRISONE) 1-0.05 % cream     Or     ciclopirox (LOPROX) 0.77 % cream    Please contact Melquiades Vázquez to clarify

## 2021-08-20 ENCOUNTER — TELEPHONE (OUTPATIENT)
Dept: INTERNAL MEDICINE CLINIC | Age: 56
End: 2021-08-20

## 2021-08-20 NOTE — TELEPHONE ENCOUNTER
Valentin Carrington Called in stating that they needs clarifications called in to the pharmacy for both medications   ciclopirox (LOPROX) 0.77 % cream     clomiPRAMINE (ANAFRANIL) 50 MG capsule      Please send a fax a set of clarifications to :[phone]  Please send clarifications to :   Hipolito Roper, [address] [place] [address], [address],[facility] [phone]   Please call to advise

## 2021-08-23 ENCOUNTER — TELEPHONE (OUTPATIENT)
Dept: INTERNAL MEDICINE CLINIC | Age: 56
End: 2021-08-23

## 2021-10-18 ENCOUNTER — OFFICE VISIT (OUTPATIENT)
Dept: INTERNAL MEDICINE CLINIC | Age: 56
End: 2021-10-18
Payer: MEDICARE

## 2021-10-18 VITALS
DIASTOLIC BLOOD PRESSURE: 78 MMHG | HEART RATE: 83 BPM | SYSTOLIC BLOOD PRESSURE: 128 MMHG | RESPIRATION RATE: 16 BRPM | OXYGEN SATURATION: 98 % | BODY MASS INDEX: 32.28 KG/M2 | WEIGHT: 213 LBS | HEIGHT: 68 IN

## 2021-10-18 DIAGNOSIS — E66.9 OBESITY (BMI 30-39.9): Primary | ICD-10-CM

## 2021-10-18 DIAGNOSIS — R73.03 PRE-DIABETES: ICD-10-CM

## 2021-10-18 DIAGNOSIS — R19.7 DIARRHEA, UNSPECIFIED TYPE: ICD-10-CM

## 2021-10-18 DIAGNOSIS — E78.2 MIXED HYPERLIPIDEMIA: ICD-10-CM

## 2021-10-18 PROCEDURE — 3017F COLORECTAL CA SCREEN DOC REV: CPT | Performed by: INTERNAL MEDICINE

## 2021-10-18 PROCEDURE — 99214 OFFICE O/P EST MOD 30 MIN: CPT | Performed by: INTERNAL MEDICINE

## 2021-10-18 PROCEDURE — G8428 CUR MEDS NOT DOCUMENT: HCPCS | Performed by: INTERNAL MEDICINE

## 2021-10-18 PROCEDURE — G8484 FLU IMMUNIZE NO ADMIN: HCPCS | Performed by: INTERNAL MEDICINE

## 2021-10-18 PROCEDURE — 1036F TOBACCO NON-USER: CPT | Performed by: INTERNAL MEDICINE

## 2021-10-18 PROCEDURE — G8417 CALC BMI ABV UP PARAM F/U: HCPCS | Performed by: INTERNAL MEDICINE

## 2021-10-18 RX ORDER — PSYLLIUM HUSK 0.4 G
1 CAPSULE ORAL DAILY
Qty: 30 CAPSULE | Refills: 2 | Status: SHIPPED | OUTPATIENT
Start: 2021-10-18 | End: 2021-12-21

## 2021-10-18 NOTE — PROGRESS NOTES
Bernardo Marquis (:  1965) is a 60 y.o. female,Established patient, here for evaluation of the following chief complaint(s): Other (Having loose stools and routine follow up)      Vitals:    10/18/21 1042   BP: 128/78   Pulse: 83   Resp: 16   SpO2: 98%        ASSESSMENT/PLAN:  1. Obesity (BMI 30-39. 9)  BMI 32.39. Check CMP, lipid panel and hemoglobin A1c  -     Comprehensive Metabolic Panel; Future  -     Lipid Panel; Future  -     Hemoglobin A1C; Future  2. Pre-diabetes  Patient with history of prediabetes. We will check a hemoglobin A1c.  -     Hemoglobin A1C; Future  3. Mixed hyperlipidemia  Minute history of mixed hyperlipidemia we will check a lipid panel.  -     Lipid Panel; Future  4. Diarrhea, unspecified type  Loose stools intermittently. Diarrhea is never watery. Patient has no abdominal pain. No fevers associated with the symptoms. Will try Metamucil to bulk up patient's stools. Return in about 3 months (around 2022). SUBJECTIVE/OBJECTIVE:  HPI    Patient is a 58-year-old female with past medical history of OCD, autism (patient not verbal), and obesity who presents secondary to follow-up for diarrhea. Patient is nonverbal all information gathered from patient's nurse assistant. Patient continues to have diarrhea : This seems to be intermittent. Patient will have loose stools that come and go. Sometimes constipation mixed with diarrea. Not getting imodium very often. No abdominal pain. No fevers. Nurse assistant does state that there can be multiple bowel movements per day. Also concerns the patient eats whatever she can get her hands on. This could include things that are not good for her. Question that this could be causing some of the diarrhea. Patient otherwise doing well. Review of Systems ROS negative except for those noted in the HPI above.        Vitals:    10/18/21 1042   BP: 128/78   Pulse: 83   Resp: 16   SpO2: 98%     Physical Exam  Constitutional:       General: She is not in acute distress. Appearance: Normal appearance. She is not ill-appearing. HENT:      Head: Normocephalic and atraumatic. Eyes:      General: No scleral icterus. Extraocular Movements: Extraocular movements intact. Conjunctiva/sclera: Conjunctivae normal.   Cardiovascular:      Rate and Rhythm: Normal rate and regular rhythm. Pulses: Normal pulses. Heart sounds: No murmur heard. No friction rub. No gallop. Pulmonary:      Effort: Pulmonary effort is normal. No respiratory distress. Breath sounds: Normal breath sounds. No wheezing. Abdominal:      General: Abdomen is flat. There is no distension. Palpations: Abdomen is soft. There is no mass. Tenderness: There is no abdominal tenderness. There is no guarding. Musculoskeletal:         General: Normal range of motion. Skin:     General: Skin is warm. Findings: No erythema or rash. Neurological:      General: No focal deficit present. Mental Status: She is alert. An electronic signature was used to authenticate this note.     --Edgar Florence,  What Type Of Note Output Would You Prefer (Optional)?: Standard Output How Severe Is Your Skin Lesion?: moderate Has Your Skin Lesion Been Treated?: not been treated Is This A New Presentation, Or A Follow-Up?: Skin Lesion

## 2021-10-19 PROBLEM — R73.03 PRE-DIABETES: Status: ACTIVE | Noted: 2021-10-19

## 2021-10-19 PROBLEM — E78.2 MIXED HYPERLIPIDEMIA: Status: ACTIVE | Noted: 2021-10-19

## 2021-12-21 RX ORDER — PSYLLIUM HUSK 0.4 G
CAPSULE ORAL
Qty: 30 CAPSULE | Refills: 0 | Status: SHIPPED | OUTPATIENT
Start: 2021-12-21 | End: 2022-01-31

## 2022-01-05 DIAGNOSIS — M25.552 CHRONIC HIP PAIN, LEFT: ICD-10-CM

## 2022-01-05 DIAGNOSIS — G89.29 CHRONIC HIP PAIN, LEFT: ICD-10-CM

## 2022-01-05 RX ORDER — PSEUDOEPHED/ACETAMINOPH/DIPHEN 30MG-500MG
TABLET ORAL
Qty: 180 TABLET | Refills: 0 | OUTPATIENT
Start: 2022-01-05

## 2022-01-28 ENCOUNTER — TELEPHONE (OUTPATIENT)
Dept: INTERNAL MEDICINE CLINIC | Age: 57
End: 2022-01-28

## 2022-01-28 NOTE — TELEPHONE ENCOUNTER
[facility] magui called to see if the patient could get the following medication to be PRN for constipation, in opposed to once daily? I am not seeing this on the list of medications at all but she says the patient takes it daily but doesn't need this as often. Reguloid one table tdaily with 8 oz of water is what the original script says.

## 2022-01-31 NOTE — TELEPHONE ENCOUNTER
Mony said she is taking the medication that was requested not Metamucil but according to [facility] patient is actually having more loose stools than constipation.

## 2022-02-07 ENCOUNTER — TELEPHONE (OUTPATIENT)
Dept: INTERNAL MEDICINE CLINIC | Age: 57
End: 2022-02-07

## 2022-02-07 NOTE — TELEPHONE ENCOUNTER
Spoke with home care nurse Susy Garrett, she said she will be setting Meredith's t-dap and shingles up at [facility] pharmacy.

## 2022-02-07 NOTE — TELEPHONE ENCOUNTER
Patient needs to have order T-dap and shingles vaccine sent to her pharmacy below because her insurance will not pay for it to be done here at the office or at the [facility].           Karishma Johnson [phone] Vijaya Acosta [phone]

## 2022-02-10 ENCOUNTER — OFFICE VISIT (OUTPATIENT)
Dept: INTERNAL MEDICINE CLINIC | Age: 57
End: 2022-02-10
Payer: MEDICARE

## 2022-02-10 VITALS
SYSTOLIC BLOOD PRESSURE: 122 MMHG | RESPIRATION RATE: 20 BRPM | HEART RATE: 102 BPM | OXYGEN SATURATION: 98 % | BODY MASS INDEX: 32.33 KG/M2 | WEIGHT: 212.6 LBS | DIASTOLIC BLOOD PRESSURE: 84 MMHG

## 2022-02-10 DIAGNOSIS — K52.9 CHRONIC DIARRHEA: ICD-10-CM

## 2022-02-10 DIAGNOSIS — R30.0 DYSURIA: Primary | ICD-10-CM

## 2022-02-10 DIAGNOSIS — M16.12 PRIMARY OSTEOARTHRITIS OF LEFT HIP: ICD-10-CM

## 2022-02-10 DIAGNOSIS — Z12.11 COLON CANCER SCREENING: ICD-10-CM

## 2022-02-10 DIAGNOSIS — N30.01 ACUTE CYSTITIS WITH HEMATURIA: ICD-10-CM

## 2022-02-10 DIAGNOSIS — R32 INCONTINENCE IN FEMALE: ICD-10-CM

## 2022-02-10 PROCEDURE — 81003 URINALYSIS AUTO W/O SCOPE: CPT | Performed by: INTERNAL MEDICINE

## 2022-02-10 PROCEDURE — G8484 FLU IMMUNIZE NO ADMIN: HCPCS | Performed by: INTERNAL MEDICINE

## 2022-02-10 PROCEDURE — 99214 OFFICE O/P EST MOD 30 MIN: CPT | Performed by: INTERNAL MEDICINE

## 2022-02-10 PROCEDURE — G8427 DOCREV CUR MEDS BY ELIG CLIN: HCPCS | Performed by: INTERNAL MEDICINE

## 2022-02-10 PROCEDURE — 3017F COLORECTAL CA SCREEN DOC REV: CPT | Performed by: INTERNAL MEDICINE

## 2022-02-10 PROCEDURE — 1036F TOBACCO NON-USER: CPT | Performed by: INTERNAL MEDICINE

## 2022-02-10 PROCEDURE — G8417 CALC BMI ABV UP PARAM F/U: HCPCS | Performed by: INTERNAL MEDICINE

## 2022-02-10 NOTE — PROGRESS NOTES
Constantin Aguero (:  1965) is a 60 y.o. female,Established patient, here for evaluation of the following chief complaint(s):  3 Month Follow-Up (poss uti- grabbing and making nosies when urinating )      Vitals:    02/10/22 1137   BP: 122/84   Pulse: 102   Resp: 20   SpO2: 98%        ASSESSMENT/PLAN:  1. Dysuria  Obtain UA with reflex to culture to evaluate for UTI. Since patient is unable to verbalize symptoms will await results prior to prescribing antibiotic.  -     URINE RT REFLEX TO CULTURE  2. Incontinence in female  -     Disposable Gloves (VINYL GLOVES MEDIUM) MISC; FACILITY REQUESTING ORDER FOR EXAM GLOVES, Disp-100 each, R-5Normal  -     URINE RT REFLEX TO CULTURE  3. Chronic diarrhea  Patient now with chronic diarrhea. Patient is off all medications that could be causing this. Patient is also due for colon cancer screening. Family to discuss going forward with possible colonoscopy. Will give referral to GI for further evaluation.  -     LEO - Gordon Magana MD, Gastroenterology, [facility]  4. Colon cancer screening  -     LEO Magana MD, Gastroenterology, [facility]  6. Primary osteoarthritis of left hip  Continue this medication as needed for OA  -     diclofenac (VOLTAREN) 75 MG EC tablet; Take 1 tablet twice daily as needed for joint pain, Disp-60 tablet, R-11Adjust Sig      Return in about 6 months (around 8/10/2022). SUBJECTIVE/OBJECTIVE:  HPI     Patient is a 78-year-old female with past medical history of prediabetes, hyperlipidemia OCD, autism and incontinence who presents secondary to follow-up for chronic diseases. Diarrhea happening 2 x per week. Dairy makes this worse. Overall much improved since stopping Metamucil. Patient's caregiver notes that patient has been grabbing at her vulva during all episodes of urination. They think that she is having pain with urination.   She is not complaining of any other symptoms including fevers/chills or pain in any other way. Review of Systems ROS negative except for those noted in the HPI above. Vitals:    02/10/22 1137   BP: 122/84   Pulse: 102   Resp: 20   SpO2: 98%         Physical Exam  Constitutional:       General: She is not in acute distress. Appearance: Normal appearance. She is not ill-appearing. HENT:      Head: Normocephalic and atraumatic. Right Ear: External ear normal.      Left Ear: External ear normal.   Eyes:      General: No scleral icterus. Extraocular Movements: Extraocular movements intact. Conjunctiva/sclera: Conjunctivae normal.   Cardiovascular:      Rate and Rhythm: Normal rate and regular rhythm. Pulses: Normal pulses. Heart sounds: No murmur heard. No friction rub. No gallop. Pulmonary:      Effort: Pulmonary effort is normal. No respiratory distress. Breath sounds: Normal breath sounds. No wheezing, rhonchi or rales. Abdominal:      General: Bowel sounds are normal. There is no distension. Palpations: Abdomen is soft. There is no mass. Tenderness: There is no abdominal tenderness. There is no guarding or rebound. Musculoskeletal:      Cervical back: Normal range of motion. No muscular tenderness. Right lower leg: No edema. Left lower leg: No edema. Lymphadenopathy:      Cervical: No cervical adenopathy. Skin:     General: Skin is warm. Findings: No erythema or rash. Neurological:      General: No focal deficit present. Mental Status: She is alert and oriented to person, place, and time. Psychiatric:         Mood and Affect: Mood normal.         Behavior: Behavior normal.           An electronic signature was used to authenticate this note.     --Jamel Luo,

## 2022-02-11 LAB
BACTERIA: ABNORMAL /HPF
BILIRUBIN URINE: NEGATIVE
BLOOD, URINE: ABNORMAL
CLARITY: ABNORMAL
COLOR: YELLOW
EPITHELIAL CELLS, UA: 4 /HPF (ref 0–5)
GLUCOSE URINE: NEGATIVE MG/DL
HYALINE CASTS: 7 /LPF (ref 0–8)
KETONES, URINE: NEGATIVE MG/DL
LEUKOCYTE ESTERASE, URINE: ABNORMAL
MICROSCOPIC EXAMINATION: YES
NITRITE, URINE: POSITIVE
PH UA: 6 (ref 5–8)
PROTEIN UA: 100 MG/DL
RBC UA: ABNORMAL /HPF (ref 0–4)
SPECIFIC GRAVITY UA: 1.02 (ref 1–1.03)
URINE REFLEX TO CULTURE: YES
URINE TYPE: ABNORMAL
UROBILINOGEN, URINE: 0.2 E.U./DL
WBC UA: >900 /HPF (ref 0–5)

## 2022-02-11 RX ORDER — NITROFURANTOIN 25; 75 MG/1; MG/1
100 CAPSULE ORAL 2 TIMES DAILY
Qty: 10 CAPSULE | Refills: 0 | Status: SHIPPED | OUTPATIENT
Start: 2022-02-11 | End: 2022-02-16

## 2022-02-11 RX ORDER — DICLOFENAC SODIUM 75 MG/1
TABLET, DELAYED RELEASE ORAL
Qty: 60 TABLET | Refills: 11
Start: 2022-02-11 | End: 2022-03-17 | Stop reason: SDUPTHER

## 2022-02-12 LAB
ORGANISM: ABNORMAL
URINE CULTURE, ROUTINE: ABNORMAL

## 2022-02-15 RX ORDER — CIPROFLOXACIN 500 MG/1
500 TABLET, FILM COATED ORAL 2 TIMES DAILY
Qty: 14 TABLET | Refills: 0 | Status: SHIPPED | OUTPATIENT
Start: 2022-02-15 | End: 2022-02-22

## 2022-03-01 ENCOUNTER — TELEPHONE (OUTPATIENT)
Dept: INTERNAL MEDICINE CLINIC | Age: 57
End: 2022-03-01

## 2022-03-01 DIAGNOSIS — M25.552 CHRONIC HIP PAIN, LEFT: Primary | ICD-10-CM

## 2022-03-01 DIAGNOSIS — G89.29 CHRONIC HIP PAIN, LEFT: Primary | ICD-10-CM

## 2022-03-01 NOTE — TELEPHONE ENCOUNTER
Patient's sister called and states that patient  is in need to have another referral for pt to continue her water PT for her hip   issues.  Please send referral [facility] at [phone]

## 2022-03-01 NOTE — TELEPHONE ENCOUNTER
----- Message from Geraldine Pallas sent at 3/1/2022 12:57 PM EST -----  Subject: Referral Request    QUESTIONS   Reason for referral request? Pt sister called and states that pt is in   need to have another referral for pt to continue her water PT for her hip   issues. Please send referral [facility] at [phone]   Has the physician seen you for this condition before? No   Preferred Specialist (if applicable)? Do you already have an appointment scheduled? No  Additional Information for Provider? Pt is already taking physical   therapy. Sister is unsure of last appt regarding this issue with a   physician.  ---------------------------------------------------------------------------  --------------  [address]  What is the best way for the office to contact you? OK to leave message on   voicemail  Preferred Call Back Phone Number?  [phone]

## 2022-03-02 ENCOUNTER — TELEPHONE (OUTPATIENT)
Dept: INTERNAL MEDICINE CLINIC | Age: 57
End: 2022-03-02

## 2022-03-02 RX ORDER — EPINEPHRINE 0.3 MG/.3ML
INJECTION SUBCUTANEOUS
Qty: 2 EACH | Refills: 3 | Status: SHIPPED | OUTPATIENT
Start: 2022-03-02 | End: 2022-03-17 | Stop reason: SDUPTHER

## 2022-03-02 NOTE — TELEPHONE ENCOUNTER
Dillon Heading with [facility] called stating that the Patient needs to have an order for her to have her epipen with her at the day program. Please fax this order to the number below:    EPIPEN 2-JAG 0.3 MG/0.3ML SOAJ injection USE AS DRECTED. TAKE WITH ON ALL OUTINGS.              [phone]

## 2022-03-14 ENCOUNTER — TELEPHONE (OUTPATIENT)
Dept: INTERNAL MEDICINE CLINIC | Age: 57
End: 2022-03-14

## 2022-03-14 NOTE — TELEPHONE ENCOUNTER
----- Message from Bhumi Demarco sent at 3/14/2022 12:29 PM EDT -----  Subject: Message to Provider    QUESTIONS  Information for Provider? Patient's guardian is going to drop off   paperwork Statement of expert evaluation to be filled out by Dr. Dalia Fam. ---------------------------------------------------------------------------  --------------  Millie STEPHENS  What is the best way for the office to contact you? OK to leave message on   voicemail  Preferred Call Back Phone Number? [phone]  ---------------------------------------------------------------------------  --------------  SCRIPT ANSWERS  Relationship to Patient? Guardian  Representative Name? Beto Payne  Is the Representative on the appropriate HIPAA document in Epic?  Yes

## 2022-03-14 NOTE — TELEPHONE ENCOUNTER
Patient's guardian is going to drop off   paperwork Statement of expert evaluation to be filled out by Dr. Deep Finch.

## 2022-03-17 ENCOUNTER — OFFICE VISIT (OUTPATIENT)
Dept: INTERNAL MEDICINE CLINIC | Age: 57
End: 2022-03-17
Payer: MEDICARE

## 2022-03-17 ENCOUNTER — TELEPHONE (OUTPATIENT)
Dept: INTERNAL MEDICINE CLINIC | Age: 57
End: 2022-03-17

## 2022-03-17 VITALS
OXYGEN SATURATION: 96 % | BODY MASS INDEX: 32.99 KG/M2 | DIASTOLIC BLOOD PRESSURE: 80 MMHG | HEART RATE: 84 BPM | WEIGHT: 217 LBS | RESPIRATION RATE: 14 BRPM | SYSTOLIC BLOOD PRESSURE: 110 MMHG

## 2022-03-17 DIAGNOSIS — K59.00 CONSTIPATION, UNSPECIFIED CONSTIPATION TYPE: Primary | ICD-10-CM

## 2022-03-17 DIAGNOSIS — J30.9 ALLERGIC RHINITIS, UNSPECIFIED SEASONALITY, UNSPECIFIED TRIGGER: ICD-10-CM

## 2022-03-17 DIAGNOSIS — F42.2 MIXED OBSESSIONAL THOUGHTS AND ACTS: ICD-10-CM

## 2022-03-17 DIAGNOSIS — F33.1 MODERATE EPISODE OF RECURRENT MAJOR DEPRESSIVE DISORDER (HCC): ICD-10-CM

## 2022-03-17 DIAGNOSIS — R32 INCONTINENCE IN FEMALE: ICD-10-CM

## 2022-03-17 DIAGNOSIS — F84.0 AUTISM: ICD-10-CM

## 2022-03-17 DIAGNOSIS — B35.3 TINEA PEDIS OF BOTH FEET: ICD-10-CM

## 2022-03-17 DIAGNOSIS — G89.29 CHRONIC HIP PAIN, LEFT: ICD-10-CM

## 2022-03-17 DIAGNOSIS — I10 PRIMARY HYPERTENSION: ICD-10-CM

## 2022-03-17 DIAGNOSIS — L30.9 DERMATITIS: ICD-10-CM

## 2022-03-17 DIAGNOSIS — M16.12 PRIMARY OSTEOARTHRITIS OF LEFT HIP: ICD-10-CM

## 2022-03-17 DIAGNOSIS — R19.7 DIARRHEA, UNSPECIFIED TYPE: ICD-10-CM

## 2022-03-17 DIAGNOSIS — T78.2XXS ANAPHYLAXIS, SEQUELA: ICD-10-CM

## 2022-03-17 DIAGNOSIS — M25.552 CHRONIC HIP PAIN, LEFT: ICD-10-CM

## 2022-03-17 PROCEDURE — G8417 CALC BMI ABV UP PARAM F/U: HCPCS | Performed by: INTERNAL MEDICINE

## 2022-03-17 PROCEDURE — 99214 OFFICE O/P EST MOD 30 MIN: CPT | Performed by: INTERNAL MEDICINE

## 2022-03-17 PROCEDURE — 3017F COLORECTAL CA SCREEN DOC REV: CPT | Performed by: INTERNAL MEDICINE

## 2022-03-17 PROCEDURE — G8427 DOCREV CUR MEDS BY ELIG CLIN: HCPCS | Performed by: INTERNAL MEDICINE

## 2022-03-17 PROCEDURE — 1036F TOBACCO NON-USER: CPT | Performed by: INTERNAL MEDICINE

## 2022-03-17 PROCEDURE — G8484 FLU IMMUNIZE NO ADMIN: HCPCS | Performed by: INTERNAL MEDICINE

## 2022-03-17 RX ORDER — DICLOFENAC SODIUM 75 MG/1
TABLET, DELAYED RELEASE ORAL
Qty: 60 TABLET | Refills: 11 | Status: SHIPPED | OUTPATIENT
Start: 2022-03-17 | End: 2022-08-18

## 2022-03-17 RX ORDER — LORATADINE 10 MG/1
10 TABLET ORAL DAILY
Qty: 30 TABLET | Refills: 5 | Status: SHIPPED | OUTPATIENT
Start: 2022-03-17

## 2022-03-17 RX ORDER — ACETAMINOPHEN 500 MG
1000 TABLET ORAL EVERY 8 HOURS PRN
Qty: 180 TABLET | Refills: 0 | Status: SHIPPED | OUTPATIENT
Start: 2022-03-17 | End: 2022-11-04 | Stop reason: SDUPTHER

## 2022-03-17 RX ORDER — EPINEPHRINE 0.3 MG/.3ML
INJECTION SUBCUTANEOUS
Qty: 2 EACH | Refills: 3 | Status: SHIPPED | OUTPATIENT
Start: 2022-03-17 | End: 2022-08-02 | Stop reason: SDUPTHER

## 2022-03-17 RX ORDER — PROPRANOLOL HYDROCHLORIDE 40 MG/1
TABLET ORAL
Qty: 90 TABLET | Refills: 11 | Status: SHIPPED | OUTPATIENT
Start: 2022-03-17

## 2022-03-17 RX ORDER — CALCIUM CARB/VITAMIN D3/VIT K1 650MG-12.5
1 TABLET,CHEWABLE ORAL DAILY
Qty: 30 TABLET | Refills: 11 | Status: SHIPPED | OUTPATIENT
Start: 2022-03-17

## 2022-03-17 RX ORDER — LOPERAMIDE HYDROCHLORIDE 2 MG/1
2 CAPSULE ORAL 2 TIMES DAILY PRN
Qty: 20 CAPSULE | Refills: 1 | Status: SHIPPED | OUTPATIENT
Start: 2022-03-17

## 2022-03-17 NOTE — PROGRESS NOTES
Carroll Oliver (:  1965) is a 60 y.o. female,Established patient, here for evaluation of the following chief complaint(s): Other (Paperwork for [facility] Kristyn Lilly' says she was unaware))      Vitals:    22 1040   BP: 110/80   Pulse: 84   Resp: 14   SpO2: 96%        ASSESSMENT/PLAN:  1. Constipation, unspecified constipation type  Currently dealing with more diarrhea than constipation. Patient can use psyllium as needed  -     psyllium 0.52 g capsule; Take 1 capsule by mouth daily as needed (constipation), Disp-30 capsule, R-0Normal  2. Diarrhea, unspecified type  Overall resolved. Can use loperamide as needed for diarrhea. I had referred patient to GI. It sounds like the patient's guardian who is her sister has refused referral to GI and colonoscopy. -     loperamide (RA ANTI-DIARRHEAL) 2 MG capsule; Take 1 capsule by mouth 2 times daily as needed for Diarrhea, Disp-20 capsule, R-1Normal  3. Chronic hip pain, left   Patient stays in a wheelchair secondary to her chronic pain. Tylenol use as needed  -     acetaminophen (ACETAMINOPHEN EXTRA STRENGTH) 500 MG tablet; Take 2 tablets by mouth every 8 hours as needed for Pain or Fever, Disp-180 tablet, R-0Normal  -     Calcium-Vitamin D-Vitamin K (VIACTIV CALCIUM PLUS D) 650-12.5-40 MG-MCG-MCG CHEW; Take 1 tablet by mouth daily, Disp-30 tablet, R-11Normal  4. Dermatitis  Clotrimazole can be used as needed  -     Clotrimazole 1 % OINT; Apply 1 each topically 2 times daily as needed (rash), Disp-60 g, R-1Normal  5. Tinea pedis of both feet  Loprox as needed  -     ciclopirox (LOPROX) 0.77 % cream; Apply topically 2 times daily as needed (for toe web fungal infection) Use for toe webs for fungal infection, Topical, 2 TIMES DAILY PRN Starting Thu 3/17/2022, Disp-1 each, R-5, Normal  6. Autism  Stable on current medications.   7. Incontinence in female  -     [address]; Disp-180 each, R-3, NormalUSE 1 6 TIMES A DAY  -     Disposable Gloves (VINYL GLOVES MEDIUM) MISC; FACILITY REQUESTING ORDER FOR EXAM GLOVES, Disp-100 each, R-5Normal  8. Primary osteoarthritis of left hip  -     diclofenac (VOLTAREN) 75 MG EC tablet; Take 1 tablet twice daily as needed for joint pain, Disp-60 tablet, R-11Normal  9. Anaphylaxis, sequela  Patient has EpiPen to take with her secondary to anaphylaxis asked to be venom  -     EPINEPHrine (EPIPEN 2-JAG) 0.3 MG/0.3ML SOAJ injection; USE AS DRECTED. TAKE WITH ON ALL OUTINGS., Disp-2 each, R-3Normal  10. Allergic rhinitis, unspecified seasonality, unspecified trigger  Continue Claritin  -     loratadine (CLARITIN) 10 MG tablet; Take 1 tablet by mouth daily Take 1 tablet daily as needed for allergies, Disp-30 tablet, R-5Normal  11. Moderate episode of recurrent major depressive disorder (HCC)  Stable on Zoloft  -     sertraline (ZOLOFT) 50 MG tablet; TAKE ONE TABLET BY MOUTH ONCE A DAY., Disp-30 tablet, R-11Normal  12. Mixed obsessional thoughts and acts  Stable on Zoloft  -     sertraline (ZOLOFT) 50 MG tablet; TAKE ONE TABLET BY MOUTH ONCE A DAY., Disp-30 tablet, R-11Normal  13. Primary hypertension  Well-controlled on current medication  -     propranolol (INDERAL) 40 MG tablet; TAKE ONE TABLET BY MOUTH THREE TIMES DAILY. , Disp-90 tablet, R-11Normal      Return in about 6 months (around 9/17/2022). SUBJECTIVE/OBJECTIVE:  HPI    Patient is a 49-year-old female with past medical history of OCD, autism, obesity, hyperlipidemia and prediabetes who presents secondary to vomiting diseases on medications    Diarrhea has improved since last visit. Patient has also been having what appeared to be pain with urination and has now resolved with treatment of UTI. Mood is currently stable per nursing staff. Blood pressures is also been well controlled. There are no current concerns. Review of Systems  ROS negative except for those noted in the HPI above.        Vitals:    03/17/22 1040   BP: 110/80   Pulse: 84 Resp: 14   SpO2: 96%         Physical Exam  Constitutional:       General: She is not in acute distress. Appearance: Normal appearance. She is not ill-appearing. HENT:      Head: Normocephalic and atraumatic. Right Ear: External ear normal.      Left Ear: External ear normal.   Eyes:      Extraocular Movements: Extraocular movements intact. Conjunctiva/sclera: Conjunctivae normal.   Cardiovascular:      Rate and Rhythm: Normal rate and regular rhythm. Heart sounds: No murmur heard. No friction rub. No gallop. Pulmonary:      Effort: Pulmonary effort is normal. No respiratory distress. Breath sounds: Normal breath sounds. No wheezing, rhonchi or rales. Abdominal:      General: Bowel sounds are normal. There is no distension. Palpations: Abdomen is soft. There is no mass. Tenderness: There is no abdominal tenderness. There is no guarding. Musculoskeletal:      Right lower leg: No edema. Left lower leg: No edema. Skin:     General: Skin is warm. Findings: No erythema or rash. Neurological:      General: No focal deficit present. Mental Status: She is alert. Mental status is at baseline. Psychiatric:      Comments: behavior normal for patient. An electronic signature was used to authenticate this note.     --Dereje Luis,

## 2022-03-17 NOTE — TELEPHONE ENCOUNTER
Verified message with Dede Sol.  Palma Kirk will be calling to set up the appointment   This is just an FYI that the sister advised it was ok

## 2022-03-17 NOTE — TELEPHONE ENCOUNTER
Patient's sister said that it is ok for the patient to be seen by Dr Petty Arana. The manager of the house is Jodie Rutherford and she will be making this appointment for the patient.         [phone]

## 2022-03-22 ENCOUNTER — TELEPHONE (OUTPATIENT)
Dept: INTERNAL MEDICINE CLINIC | Age: 57
End: 2022-03-22

## 2022-03-22 NOTE — TELEPHONE ENCOUNTER
Yuly Ramírez with [address] is wanting Dr. Shayna Becker to clarify with [facility] patient is to be taking Immodium and if she should be taking. Please call Yuly Ramírez at [facility] with any questions/concerns.

## 2022-03-31 ENCOUNTER — OFFICE VISIT (OUTPATIENT)
Dept: PSYCHIATRY | Age: 57
End: 2022-03-31
Payer: MEDICARE

## 2022-03-31 ENCOUNTER — TELEPHONE (OUTPATIENT)
Dept: INTERNAL MEDICINE CLINIC | Age: 57
End: 2022-03-31

## 2022-03-31 VITALS
SYSTOLIC BLOOD PRESSURE: 128 MMHG | HEART RATE: 72 BPM | BODY MASS INDEX: 33.45 KG/M2 | WEIGHT: 220 LBS | DIASTOLIC BLOOD PRESSURE: 78 MMHG | RESPIRATION RATE: 12 BRPM

## 2022-03-31 DIAGNOSIS — F84.0 AUTISM: Primary | ICD-10-CM

## 2022-03-31 DIAGNOSIS — F42.2 MIXED OBSESSIONAL THOUGHTS AND ACTS: ICD-10-CM

## 2022-03-31 PROCEDURE — G8417 CALC BMI ABV UP PARAM F/U: HCPCS | Performed by: STUDENT IN AN ORGANIZED HEALTH CARE EDUCATION/TRAINING PROGRAM

## 2022-03-31 PROCEDURE — 3017F COLORECTAL CA SCREEN DOC REV: CPT | Performed by: STUDENT IN AN ORGANIZED HEALTH CARE EDUCATION/TRAINING PROGRAM

## 2022-03-31 PROCEDURE — 1036F TOBACCO NON-USER: CPT | Performed by: STUDENT IN AN ORGANIZED HEALTH CARE EDUCATION/TRAINING PROGRAM

## 2022-03-31 PROCEDURE — G8484 FLU IMMUNIZE NO ADMIN: HCPCS | Performed by: STUDENT IN AN ORGANIZED HEALTH CARE EDUCATION/TRAINING PROGRAM

## 2022-03-31 PROCEDURE — G8428 CUR MEDS NOT DOCUMENT: HCPCS | Performed by: STUDENT IN AN ORGANIZED HEALTH CARE EDUCATION/TRAINING PROGRAM

## 2022-03-31 PROCEDURE — 99203 OFFICE O/P NEW LOW 30 MIN: CPT | Performed by: STUDENT IN AN ORGANIZED HEALTH CARE EDUCATION/TRAINING PROGRAM

## 2022-03-31 RX ORDER — CLOMIPRAMINE HYDROCHLORIDE 50 MG/1
CAPSULE ORAL
Qty: 60 CAPSULE | Refills: 3 | Status: SHIPPED | OUTPATIENT
Start: 2022-03-31 | End: 2022-07-28 | Stop reason: SDUPTHER

## 2022-03-31 RX ORDER — SERTRALINE HYDROCHLORIDE 100 MG/1
TABLET, FILM COATED ORAL
Qty: 30 TABLET | Refills: 3 | Status: SHIPPED | OUTPATIENT
Start: 2022-03-31 | End: 2022-07-28 | Stop reason: SDUPTHER

## 2022-03-31 RX ORDER — PERPHENAZINE 2 MG/1
2 TABLET, FILM COATED ORAL NIGHTLY
Qty: 30 TABLET | Refills: 3 | Status: SHIPPED | OUTPATIENT
Start: 2022-03-31 | End: 2022-07-22

## 2022-03-31 NOTE — TELEPHONE ENCOUNTER
Zoran Landrum called stating that the Patient is there for Aquatic therapy and needs to have her referral updated. As last one has .       Can we please update and fax this to: [phone]

## 2022-03-31 NOTE — PROGRESS NOTES
[address]  1965 03/31/22  Face to Face time: 30 minutes  PCP: Ninoska Strange DO    CC: Medication Check      ASSESSMENT:   Patient is a 49-year-old female with a past medical history significant of autism, prediabetes, mixed hyperlipidemia who presents to the outpatient psychiatric clinic today for evaluation and management of OCD and autistic behaviors. Patient's presentation today is consistent with a longstanding diagnosis of autism that would meet criteria for level 3. The patient does require significant caregiving needs as she is nonverbal and is reliant on staff for assistance in most activities of daily living. Associated with the patient's autism is noticeable OCD behaviors including ritualistic behaviors such as picking things up off the floor and throwing them over her shoulder. The patient is currently demonstrating some difficulties with this, as she has been having difficulties with stealing things from staff members as well as other patients and going into other patient's rooms at nighttime in order to steal things. We will work with her medication regimen to try and optimize such in order to mitigate some of these behaviors. Diagnosis:  Autism, level 3  OCD    PLAN:   1. Discussed with patient potential management options further conditions including medication management as well as nonpharmacologic strategies. Patient on board with current plan of care. 2.  We will plan to continue the patient on her current medications of perphenazine 2 mg nightly, clomipramine 100 mg nightly, and propranolol 40 mg 3 times daily. 3.  We will plan to increase the patient's sertraline back to 100 mg daily which is where it had been previously.   Patient's caregivers were cautioned regarding the medication increase and how it can change bowel function, advised to be on the look out for nausea/diarrhea in the first week of the medication change. Medication Monitoring:    - PDMP reviewed: No recent prescriptions. Last prescription for diazepam was in April 2021 and a very brief supply      Follow-up: RTC in 4 months    Safety: Pt was counseled on the potential for increased suicidal ideations and advised on potential options for dealing with these including hotlines, calling the office, or going to the nearest emergency room. ____________________________________________________________________________    HPI:   Patient is a 70-year-old female with a past medical history significant of autism, prediabetes, mixed hyperlipidemia who presents to the outpatient psychiatric clinic today for evaluation and management of OCD and autistic behaviors. Patient was accompanied today by a caregiving representative of [facility] [address]. They provided the entirety of the history as the patient is nonverbal.    The caregiver indicated that the patient has been a long-term resident at [facility] [address] and that she generally does well there. The patient will go through a day program and has had no poor reports from there. The staff at [facility] [address] have noted some behavioral difficulties including stealing behaviors. The patient is noted to be at times rather impulsive and will take things even out of other people's hands. She has also been known to go into other patient's rooms at nighttime and take things. Per the caregiver, the staff is waking her up every 2 hours at nighttime in order to get her up and go to the restroom due to her urinary incontinence. The caregiver indicated that there have been no concerns regarding decreases in energy or a sense of restlessness during the daytime. They have noticed ritualistic OCD behaviors including picking things up off the ground and throwing them over her shoulder. Patient is noted to generally eat well and to otherwise interact well with staff. There have been no aggressive incidents.     The patient's sister is heavily involved with her care. Patient is noted to enjoy things such as puzzles or music of any kind. The patient's sister will take her swimming 2 times a week, and the patient is noted to significantly enjoy this. Staff have never had any concerns about self harming behaviors. ROS:   Review of Systems   Unable to perform ROS: Patient nonverbal        Past Psychiatric History:     Hosp: Denied  Diagnoses: OCD, autism spectrum w/ moderate MR Anaya medications: Sertraline 50 mg daily, propranolol 40 mg 3 times daily, clomipramine 100 mg nightly, perphenazine 2 mg nightly  Med trials: Diazepam,  Outpt: Previously saw [facility] for psychiatric care  NSSI: Denied  Suicide Attempts: None per records    Past Medical History:   Diagnosis Date    Autism     COVID-19     4/2020    Irregular uterine bleeding     Left hip pain     Menopause ovarian failure     Mental retardation     OCD (obsessive compulsive disorder)     Urinary incontinence      Past Surgical History:   Procedure Laterality Date    BREAST BIOPSY      CYSTOSCOPY N/A 7/16/2021    CYSTOSCOPY WITH PELVIC EXAM AND PAPANICOLAOU SMEAR performed by Michael Knight MD at [facility] [address] N/A 7/16/2021    CYSTOSCOPY BLADDER BIOPSY and fulgeration performed by Michael Knight MD at [place] [facility] [address], TOTAL ABDOMINAL       Social History     Socioeconomic History    Marital status: Single     Spouse name: None    Number of children: 0    Years of education: None    Highest education level: None   Occupational History    Occupation: disability    Tobacco Use    Smoking status: Never Smoker    Smokeless tobacco: Never Used   Vaping Use    Vaping Use: Never used   Substance and Sexual Activity    Alcohol use: No    Drug use: No    Sexual activity: Never   Other Topics Concern    None   Social History Narrative    Patient is a long-term resident of [address] [place] [address] house.  Her sister does provide significant care and interaction with her there. Social Determinants of Health     Financial Resource Strain: Low Risk     Difficulty of Paying Living Expenses: Not hard at all   Food Insecurity: No Food Insecurity    Worried About Running Out of Food in the Last Year: Never true    Krista of Food in the Last Year: Never true   Transportation Needs: No Transportation Needs    Lack of Transportation (Medical): No    Lack of Transportation (Non-Medical):  No   Physical Activity:     Days of Exercise per Week: Not on file    Minutes of Exercise per Session: Not on file   Stress: No Stress Concern Present    Feeling of Stress : Not at all   Social Connections:     Frequency of Communication with Friends and Family: Not on file    Frequency of Social Gatherings with Friends and Family: Not on file    Attends Restorationist Services: Not on file    Active Member of Clubs or Organizations: Not on file    Attends Club or Organization Meetings: Not on file    Marital Status: Not on file   Intimate Partner Violence: Not At Risk    Fear of Current or Ex-Partner: No    Emotionally Abused: No    Physically Abused: No    Sexually Abused: No   Housing Stability: Unknown    Unable to Pay for Housing in the Last Year: No    Number of [place] in the Last Year: Not on file    Unstable Housing in the Last Year: No      Family History   Problem Relation Age of Onset    Colon Cancer Paternal Grandfather      Allergies   Allergen Reactions    Bee Venom Anaphylaxis    Shellfish-Derived Products      Includes Iodine    Amoxil [Amoxicillin] Rash    Bactrim [Sulfamethoxazole-Trimethoprim] Rash    Codeine Rash    Strawberry Flavor Rash     Current Outpatient Medications on File Prior to Visit   Medication Sig Dispense Refill    loperamide (RA ANTI-DIARRHEAL) 2 MG capsule Take 1 capsule by mouth 2 times daily as needed for Diarrhea 20 capsule 1    acetaminophen (ACETAMINOPHEN EXTRA STRENGTH) 500 MG tablet Take 2 tablets by mouth every 8 hours as needed for Pain or Fever 180 tablet 0    Calcium-Vitamin D-Vitamin K (VIACTIV CALCIUM PLUS D) 650-12.5-40 MG-MCG-MCG CHEW Take 1 tablet by mouth daily 30 tablet 11    Clotrimazole 1 % OINT Apply 1 each topically 2 times daily as needed (rash) 60 g 1    psyllium 0.52 g capsule Take 1 capsule by mouth daily as needed (constipation) 30 capsule 0    ciclopirox (LOPROX) 0.77 % cream Apply topically 2 times daily as needed (for toe web fungal infection) Use for toe webs for fungal infection 1 each 5    Diapers & Supplies MISC USE 1 6 TIMES A  each 3    diclofenac (VOLTAREN) 75 MG EC tablet Take 1 tablet twice daily as needed for joint pain 60 tablet 11    Disposable Gloves (VINYL GLOVES MEDIUM) MISC FACILITY REQUESTING ORDER FOR EXAM GLOVES 100 each 5    EPINEPHrine (EPIPEN 2-JAG) 0.3 MG/0.3ML SOAJ injection USE AS DRECTED. TAKE WITH ON ALL OUTINGS. 2 each 3    loratadine (CLARITIN) 10 MG tablet Take 1 tablet by mouth daily Take 1 tablet daily as needed for allergies 30 tablet 5    propranolol (INDERAL) 40 MG tablet TAKE ONE TABLET BY MOUTH THREE TIMES DAILY. 90 tablet 11     No current facility-administered medications on file prior to visit. OBJECTIVE:  Vitals:    03/31/22 1339   BP: 128/78   Pulse: 72   Resp: 12   Weight: 220 lb (99.8 kg)       MSE:   Appearance:    Appropriately dressed  Motor: Significant rocking noted throughout the course of the evaluation  Eye Contact: Poor. No fixed eye contact even when spoken to  Speech:    None  Language:   None  Mood/Affect:   Patient unable to state/mildly anxious  Thought Process:    Some perseverative thought processes were evident as the patient engaged in ritualistic behaviors of picking things up off the ground and throwing them over her shoulder. Patient did this approximately 5 times within the course of the half hour evaluation.   Thought Content:    Unable to determine  Hallucinations:   Not seem to be responding to internal stimuli  Associations:   Unable to determine  Attention/Concentration:   None  Orientation:    Minimal reorientation when name is stated  Memory:   Unable to determine  Fund of Knowledge:    Significantly intellectual disability noted  Insight/Judgement:   Absent/limited    No flowsheet data found. PHQ-9 Questionaire 6/4/2021 2/22/2021   Little interest or pleasure in doing things 0 0   Feeling down, depressed, or hopeless 0 0   PHQ-9 Total Score 0 0        AIMS Scale  Facial and Oral Movements(AIMS)  Muscles of Facial Expression: None, normal  Lips and Perioral Area: None, normal  Jaw: None, normal  Tongue: None, normal  Extremity Movements(AIMS)  Upper (arms, wrists, hands, fingers): None, normal  Lower (legs, knees, ankles, toes): None, normal  Trunk Movements(AIMS)  Neck, shoulders, hips: None, normal  Overall Severity(AIMS)  Severity of abnormal movements (highest score from 1-7): 0  Incapacitation due to abnormal movements: None, normal  Patient's awareness of abnormal movements (rate only patient's report):  No Awareness  Total of items 1-7: 0  Copy above value to this cell: 0  Dental Status(AIMS)  Current problems with teeth and/or dentures?: No  Does patient usually wear dentures?: No     Labs:     Lab Results   Component Value Date    CHOL 192 10/18/2021    CHOL 215 (H) 08/13/2020    CHOL 162 02/13/2019     Lab Results   Component Value Date    TRIG 108 10/18/2021    TRIG 189 (H) 08/13/2020    TRIG 129 02/13/2019     Lab Results   Component Value Date    HDL 52 10/18/2021    HDL 48 08/13/2020    HDL 50 02/13/2019     Lab Results   Component Value Date    LDLCALC 118 (H) 10/18/2021    LDLCALC 129 (H) 08/13/2020    LDLCALC 86 02/13/2019     Lab Results   Component Value Date    LABVLDL 22 10/18/2021    LABVLDL 38 08/13/2020    LABVLDL 26 02/13/2019       Lab Results   Component Value Date    LABA1C 5.4 10/18/2021     Lab Results   Component Value Date    .3 10/18/2021       Lab Results   Component Value Date    TSHREFLEX 1.63 06/04/2021          Lab Results   Component Value Date    WTTSNMKP13 039 11/28/2016     Lab Results   Component Value Date    FOLATE 13.41 11/28/2016       Last Drug screen: 11/28/2016 - for all substances    Imaging: No pertinent imaging    EKG: None on file        Amy Gallego MD   Psychiatry

## 2022-04-21 DIAGNOSIS — F42.2 MIXED OBSESSIONAL THOUGHTS AND ACTS: ICD-10-CM

## 2022-04-22 RX ORDER — CLOMIPRAMINE HYDROCHLORIDE 50 MG/1
CAPSULE ORAL
Qty: 62 CAPSULE | Refills: 0 | OUTPATIENT
Start: 2022-04-22

## 2022-04-22 RX ORDER — SERTRALINE HYDROCHLORIDE 100 MG/1
TABLET, FILM COATED ORAL
Qty: 31 TABLET | Refills: 0 | OUTPATIENT
Start: 2022-04-22

## 2022-06-18 ENCOUNTER — HOSPITAL ENCOUNTER (EMERGENCY)
Age: 57
Discharge: HOME | End: 2022-06-18
Attending: STUDENT IN AN ORGANIZED HEALTH CARE EDUCATION/TRAINING PROGRAM
Payer: MEDICARE

## 2022-06-18 VITALS
SYSTOLIC BLOOD PRESSURE: 113 MMHG | HEART RATE: 94 BPM | DIASTOLIC BLOOD PRESSURE: 77 MMHG | WEIGHT: 227 LBS | BODY MASS INDEX: 34.52 KG/M2 | RESPIRATION RATE: 15 BRPM | OXYGEN SATURATION: 98 % | TEMPERATURE: 97.5 F

## 2022-06-18 DIAGNOSIS — L53.9 FOOT ERYTHEMA: Primary | ICD-10-CM

## 2022-06-18 PROCEDURE — 99283 EMERGENCY DEPT VISIT LOW MDM: CPT

## 2022-06-18 ASSESSMENT — PAIN - FUNCTIONAL ASSESSMENT: PAIN_FUNCTIONAL_ASSESSMENT: WONG-BAKER FACES

## 2022-06-18 ASSESSMENT — PAIN SCALES - WONG BAKER: WONGBAKER_NUMERICALRESPONSE: 4

## 2022-06-18 ASSESSMENT — PAIN DESCRIPTION - ORIENTATION: ORIENTATION: RIGHT;LEFT

## 2022-06-18 ASSESSMENT — PAIN DESCRIPTION - LOCATION: LOCATION: FOOT

## 2022-06-19 NOTE — ED PROVIDER NOTES
ED Attending Attestation Note     Date of evaluation: 6/18/2022    This patient was seen by the resident. I have seen and examined the patient, agree with the workup, evaluation, management and diagnosis. The care plan has been discussed. My assessment reveals a 57-year-old female with a history of nonverbal autism presenting with a chief complaint of erythema to her bilateral feet. Caretaker first noticed today when she was putting some cream of the patient's feet. Patient does not appear to be in any pain, no warmth, drainage or wounds. He does have an abnormal gait secondary to some recent hip pain. Discussed suspicion that this may be secondary to loadbearing. Low suspicion for infectious etiology at this time, x-rays offered and family elects to follow-up outpatient with her podiatrist as well as primary care physician. Discussed strict return precautions.      Luz Maria Oneill MD  06/18/22 2120

## 2022-06-19 NOTE — ED PROVIDER NOTES
[address] RESIDENT NOTE       Date of evaluation: 6/18/2022    Chief Complaint     Foot Pain (redness to both feet (bottom) and painful to walk)      History of Present Illness     Monisha Elliott is a 60 y.o. female with hx autism, who presents for evaluation of right foot erythema and suspected discomfort. History is primarily provided by the patient's caregiver, her sister is a patient is nonverbal at baseline. The family member noticed that when she went to visit her sister today, she was putting cream on her feet and that she seemed uncomfortable whenever she palpated the toes and the dorsal and plantar aspect of the right foot and there is increased erythema there. This not something she did previously. Patient has history of chronic left hip pain that is not amenable to surgical evaluation given the mobility limitations per what her sister is describing; no recent shoes, other topical agents and use recently. She said no fevers or chills that they are aware of and otherwise been acting like her self. She is nonambulatory typically at baseline for the most part, and uses a wheelchair, however does horizontal 1 or 2 step movements at most.    Review of Systems     A complete review of systems was performed and is otherwise negative. Past Medical, Surgical, Family, and Social History     She has a past medical history of Autism, COVID-19, Irregular uterine bleeding, Left hip pain, Menopause ovarian failure, Mental retardation, OCD (obsessive compulsive disorder), and Urinary incontinence. She has a past surgical history that includes Hysterectomy (1975); Hysterectomy, total abdominal; Breast biopsy; Cystoscopy (N/A, 7/16/2021); and cystoscopy w biopsy of bladder (N/A, 7/16/2021). Her family history includes Colon Cancer in her paternal grandfather. She reports that she has never smoked.  She has never used smokeless tobacco. She reports that she does not drink alcohol and does not use drugs. Medications     Discharge Medication List as of 6/18/2022  9:26 PM      CONTINUE these medications which have NOT CHANGED    Details   clomiPRAMINE (ANAFRANIL) 50 MG capsule TAKE TWO (2) CAPSULES BY MOUTH AT BEDTIME., Disp-60 capsule, R-3Normal      sertraline (ZOLOFT) 100 MG tablet TAKE ONE TABLET BY MOUTH ONCE A DAY., Disp-30 tablet, R-3Normal      perphenazine 2 MG tablet Take 1 tablet by mouth nightly, Disp-30 tablet, R-3Normal      loperamide (RA ANTI-DIARRHEAL) 2 MG capsule Take 1 capsule by mouth 2 times daily as needed for Diarrhea, Disp-20 capsule, R-1Normal      acetaminophen (ACETAMINOPHEN EXTRA STRENGTH) 500 MG tablet Take 2 tablets by mouth every 8 hours as needed for Pain or Fever, Disp-180 tablet, R-0Normal      Calcium-Vitamin D-Vitamin K (VIACTIV CALCIUM PLUS D) 650-12.5-40 MG-MCG-MCG CHEW Take 1 tablet by mouth daily, Disp-30 tablet, R-11Normal      Clotrimazole 1 % OINT Apply 1 each topically 2 times daily as needed (rash), Disp-60 g, R-1Normal      psyllium 0.52 g capsule Take 1 capsule by mouth daily as needed (constipation), Disp-30 capsule, R-0Normal      ciclopirox (LOPROX) 0.77 % cream Apply topically 2 times daily as needed (for toe web fungal infection) Use for toe webs for fungal infection, Topical, 2 TIMES DAILY PRN Starting Thu 3/17/2022, Disp-1 each, R-5, Normal      Diapers & Supplies MISC Disp-180 each, R-3, NormalUSE 1 6 TIMES A DAY      diclofenac (VOLTAREN) 75 MG EC tablet Take 1 tablet twice daily as needed for joint pain, Disp-60 tablet, R-11Normal      Disposable Gloves (VINYL GLOVES MEDIUM) MISC FACILITY REQUESTING ORDER FOR EXAM GLOVES, Disp-100 each, R-5Normal      EPINEPHrine (EPIPEN 2-JAG) 0.3 MG/0.3ML SOAJ injection USE AS DRECTED.  TAKE WITH ON ALL OUTINGS., Disp-2 each, R-3Normal      loratadine (CLARITIN) 10 MG tablet Take 1 tablet by mouth daily Take 1 tablet daily as needed for allergies, Disp-30 tablet, n/a    ED Course     Nursing Notes, Past Medical Hx, Past Surgical Hx, Social Hx, Allergies, and Family Hx were reviewed. The patient was given the followingmedications:  No orders of the defined types were placed in this encounter. CONSULTS:  None    MEDICAL DECISION MAKING / ASSESSMENT / PLAN     Monisha Elliott is a 60 y.o. female presenting for evaluation of right foot plantar surface erythema. Lower suspicion for cellulitis given absence of to palpation, and rather appears to be more consistent with overuse injury and mild local trauma in the setting of probable compensation secondary to gait abnormalities of the left hip pain. She is no pain to passive or active range of motion of the bilateral lower extremities suggestive of septic arthritis, there is no heat to palpation or obvious effusion in the smaller or larger joints of the bilateral lower extremities. Unlikely to represent polycythemia given absence of other digit involvement, no dactylitis noted. As such, and with noticeable pain, was recommended to avoid additionally topical agents, driveline or the erythemic cyst to track further potential progression, and to follow-up with podiatry as scheduled. The caregiver/sister was agreeable to this plan, was discharged in stable condition. I offered an x-ray, however the family member did not feel that this was necessary and declined while in the emergency department in the course of her care. This patient was also evaluated by the attending physician. All care plans werediscussed and agreed upon. ED Course as of 06/19/22 0245   Sat Jun 18, 2022 2053 BP: 113/77 [LG]   2053 Heart Rate: 94 [LG]   2053 Resp: 15 [LG]   2053 SpO2: 98 % [LG]   2053 Temp: 97.5 °F (36.4 °C) [LG]      ED Course User Index  [LG] Hi Wise MD         Clinical Impression     1.  Foot erythema        Disposition     PATIENT REFERRED TO:  Anitha Nazario [address]  [address]    In 1 week      Robert Reynaga, [address]  [phone]            DISCHARGE MEDICATIONS:  Discharge Medication List as of 6/18/2022  9:26 PM          DISPOSITION Decision To Discharge 06/18/2022 09:15:17 PM      Mino Dietrich MD    PGY-2, [facility]   Emergency Medicine       Clare Miguel MD  Resident  06/19/22 8809

## 2022-06-19 NOTE — ED NOTES
Pt being discharged. Went over all discharge instructions and all questions answered. Pt taken out of department with all belongings at bedside by guardian.       Cesar Moore RN  06/18/22 2722

## 2022-06-19 NOTE — ED NOTES
Pt comes to the ED with complaints of redness/pain to bottom of both feet (right worse than left). Pt is autistic and non-verbal. Sister/Guardian at bedside.       Marisa Shaw RN  06/18/22 2052

## 2022-07-01 ENCOUNTER — TELEPHONE (OUTPATIENT)
Dept: INTERNAL MEDICINE CLINIC | Age: 57
End: 2022-07-01

## 2022-07-01 NOTE — TELEPHONE ENCOUNTER
Dontae Bojorquez with [facility] is requesting that our office call the pharmacy to inform them that Dr. Kenton Nowak is now the prescribing doctor for patient's   Sertraline. [facility] [employer] is in the process of cleaning up patient medication list prior to the state coming. She states patient is on 2 different doses, and one was prescribed by Dr. Michelle Nowak, and the other by Dr. Kenton Nowak, which will send a red flag.     Please contact Bright Villanueva [phone] - f [phone]

## 2022-07-01 NOTE — TELEPHONE ENCOUNTER
Pt has been taking 150mg due to MD MARITA CHÁVEZ [facility] script not being discontinued with jere woos was called to be made aware of the 50mg needing to be discontinued

## 2022-07-06 ENCOUNTER — OFFICE VISIT (OUTPATIENT)
Dept: INTERNAL MEDICINE CLINIC | Age: 57
End: 2022-07-06
Payer: MEDICARE

## 2022-07-06 VITALS — SYSTOLIC BLOOD PRESSURE: 130 MMHG | HEART RATE: 65 BPM | DIASTOLIC BLOOD PRESSURE: 78 MMHG | OXYGEN SATURATION: 98 %

## 2022-07-06 DIAGNOSIS — M16.12 OSTEOARTHRITIS OF LEFT HIP, UNSPECIFIED OSTEOARTHRITIS TYPE: Primary | ICD-10-CM

## 2022-07-06 DIAGNOSIS — N18.30 STAGE 3 CHRONIC KIDNEY DISEASE, UNSPECIFIED WHETHER STAGE 3A OR 3B CKD (HCC): ICD-10-CM

## 2022-07-06 PROCEDURE — 99213 OFFICE O/P EST LOW 20 MIN: CPT | Performed by: INTERNAL MEDICINE

## 2022-07-06 PROCEDURE — G8417 CALC BMI ABV UP PARAM F/U: HCPCS | Performed by: INTERNAL MEDICINE

## 2022-07-06 PROCEDURE — 1036F TOBACCO NON-USER: CPT | Performed by: INTERNAL MEDICINE

## 2022-07-06 PROCEDURE — 3017F COLORECTAL CA SCREEN DOC REV: CPT | Performed by: INTERNAL MEDICINE

## 2022-07-06 PROCEDURE — G8427 DOCREV CUR MEDS BY ELIG CLIN: HCPCS | Performed by: INTERNAL MEDICINE

## 2022-07-06 ASSESSMENT — PATIENT HEALTH QUESTIONNAIRE - PHQ9: DEPRESSION UNABLE TO ASSESS: FUNCTIONAL CAPACITY MOTIVATION LIMITS ACCURACY

## 2022-07-06 NOTE — PROGRESS NOTES
Dede Lee (:  1965) is a 60 y.o. female,Established patient, here for evaluation of the following chief complaint(s):  Joint Pain (right hip x over a year )      Vitals:    22 1138   BP: 130/78   Pulse: 65   SpO2: 98%        ASSESSMENT/PLAN:  1. Osteoarthritis of left hip, unspecified osteoarthritis type  Patient with history of severe osteoarthritis of the left hip. Patient has seen orthopedic surgery in the past, Dr. Maia Elder, with recommendation for hip replacement. Patient's family declined surgical intervention at that time, . Patient now seems to be having increased pain. She is attempting to not weight-bear on the left leg. She has a very antalgic gait favoring the left side. She is taking Tylenol 3 g daily for pain.  -Obtain x-ray of the left hip to rule out fracture versus dislocation versus other etiology causing increased pain  -Educated staff to make an appointment with Dr. Maia Elder, orthopedic surgery patient may benefit from at least a steroid injection if replacement is not a viable option.  -Continue Tylenol and can add Salonpas lidocaine patch or Voltaren gel for pain, may need tramadol or another medication however would like to avoid this given patient's already altered mental status at baseline  -     XR HIP LEFT (2-3 VIEWS); Future      Return in about 4 weeks (around 8/3/2022) for hip pain. SUBJECTIVE/OBJECTIVE:  HPI    Patient is a 58-year-old nonverbal female with past medical history of prediabetes, hyperlipidemia, OCD and obesity who presents secondary to pain in the hip. Staff feels her pain is increasing. Getting in the walk in shower and getting up and down on toilet causes patient to scream in with a feels pain. They feel that it could be her left hip. It does look like it is noted by her prior PCP that it was thought the patient would benefit from left hip replacement however family felt that she should not go forward with this.   Therefore surgery was not completed. Patient had seen orthopedic surgery Dr. Kayla Ho. It was also noted in prior PCPs note that patient was at high risk for dislocation. Patient is nonverbal and therefore unable to give me any history today. Hx obtained from patients group home staff member. Review of Systems ROS negative except for those noted in the HPI above. Vitals:    07/06/22 1138   BP: 130/78   Pulse: 65   SpO2: 98%     Physical Exam  Constitutional:       General: She is not in acute distress. Appearance: Normal appearance. She is not ill-appearing. HENT:      Head: Normocephalic and atraumatic. Right Ear: External ear normal.      Left Ear: External ear normal.      Nose: No congestion or rhinorrhea. Mouth/Throat:      Mouth: Mucous membranes are moist.      Pharynx: No oropharyngeal exudate or posterior oropharyngeal erythema. Eyes:      General: No scleral icterus. Extraocular Movements: Extraocular movements intact. Conjunctiva/sclera: Conjunctivae normal.   Cardiovascular:      Rate and Rhythm: Normal rate and regular rhythm. Pulses: Normal pulses. Heart sounds: No murmur heard. No friction rub. No gallop. Pulmonary:      Effort: Pulmonary effort is normal. No respiratory distress. Breath sounds: Normal breath sounds. No wheezing, rhonchi or rales. Abdominal:      General: Bowel sounds are normal. There is no distension. Palpations: Abdomen is soft. There is no mass. Tenderness: There is no abdominal tenderness. There is no guarding or rebound. Musculoskeletal:      Cervical back: No muscular tenderness. Right lower leg: No edema. Left lower leg: No edema. Comments: Patient seems to be attempted to not weight bear on the left foot when she is standing. Antalgic gait, patient trying to stay off left leg. Lymphadenopathy:      Cervical: No cervical adenopathy. Skin:     Findings: No erythema or rash.    Neurological: General: No focal deficit present. Mental Status: She is alert and oriented to person, place, and time. Gait: Gait abnormal.   Psychiatric:         Mood and Affect: Mood normal.         Behavior: Behavior normal.         An electronic signature was used to authenticate this note.     --Edgar Florence, DO

## 2022-07-13 ENCOUNTER — TELEPHONE (OUTPATIENT)
Dept: INTERNAL MEDICINE CLINIC | Age: 57
End: 2022-07-13

## 2022-07-13 NOTE — TELEPHONE ENCOUNTER
Melquiades Vázquez is requesting that patient's acetaminophen (ACETAMINOPHEN EXTRA STRENGTH) 500 MG tablet for [employer] be discontinued. I do not see an order specifically written for [employer]. Please contact Melquiades Vázquez with questions/concerns.

## 2022-07-22 RX ORDER — PERPHENAZINE 2 MG/1
2 TABLET, FILM COATED ORAL NIGHTLY
Qty: 90 TABLET | Refills: 0 | Status: SHIPPED | OUTPATIENT
Start: 2022-07-22 | End: 2022-07-28 | Stop reason: SDUPTHER

## 2022-07-27 NOTE — PROGRESS NOTES
PSYCHIATRY PROGRESS NOTE    Jose Sotomayor  1965 07/28/22  Face to Face time: 25 minutes  PCP: Avis Wood DO    CC:   Chief Complaint   Patient presents with    Follow-up     Patient is a 67-year-old female with a past medical history significant of autism, prediabetes, mixed hyperlipidemia who presents to the outpatient psychiatric clinic today for evaluation and management of OCD and autistic behaviors. A:  Patient presentation today remains essentially unchanged from previous. She continues to have some mild OCD symptoms as well as her chronic autistic behaviors. We will continue regular surveillance with the patient as required. Diagnosis:  Autism, level 3  OCD    P:   1. We will plan to maintain the patient on her current medications of perphenazine 2 mg nightly, clomipramine 100 mg nightly, and sertraline 100 mg daily for treatment of OCD and autistic symptoms. Medication Monitoring:    - PDMP reviewed: No current medications    Follow-up: 4 months for regular surveillance    Safety: Pt was counseled on the potential for increased suicidal ideations and advised on potential options for dealing with these including hotlines, calling the office, or going to the nearest emergency room. __________________________________________________________________________    S:   Patient was accompanied by her caregiver Sendy today. The patient remains nonverbal and largely noncommunicative. She was asked general questions such as how she is doing or what kind of fun things she might be doing today, however she was unable to provide a meaningful response to these. The patient was noted to be singing/humming throughout the evaluation. Patient's caregiver indicated that the patient was having no particular distressing symptoms at this time.   She still continues with some impulsive stealing as well as with her ritualistic picking up of things off the ground and throwing them over her shoulder. The patient does not appear to be in any distress with any of these behaviors and is seem to be rather normal for her. ROS:  Review of Systems   Unable to perform ROS: Patient nonverbal      Brief Medical Hx:   Patient Active Problem List   Diagnosis    OCD (obsessive compulsive disorder)    Autism    Obesity (BMI 30-39. 9)    Chronic hip pain, left    Dependent edema    Urge incontinence    Mixed hyperlipidemia    Pre-diabetes    Chronic renal disease, stage III ([facility] [address] [271506]        Brief Psych Hx:  Hosp: Denied  Diagnoses: OCD, autism spectrum w/ moderate MR  Med trials: Diazepam,  Outpt: Previously saw [facility] for [facility] care  NSSI: Denied  Suicide Attempts: None per records    O:  Wt Readings from Last 3 Encounters:   06/18/22 227 lb (103 kg)   03/31/22 220 lb (99.8 kg)   03/17/22 217 lb (98.4 kg)       Vitals:    07/28/22 1005   BP: 104/68   Pulse: 91       Mental Status Exam:   Appearance:    Appropriately dressed  Motor: Significant rocking noted throughout the course of the evaluation. Patient able to maneuver her wheelchair relatively well to get out of the room  Eye Contact: Poor. No fixed eye contact even when spoken to  Speech:    None. Nonspecific singing/humming noted  Language:   None  Mood/Affect:   Patient unable to state/mildly anxious  Thought Process:    Unable to determine  Thought Content:    Unable to determine  Hallucinations:   Not seen to be responding to internal stimuli  Associations:   Unable to determine  Attention/Concentration:   None  Orientation:    Minimal reorientation when name was stated  Memory:   Unable to determine  Fund of Knowledge:    Significantly intellectual disability noted  Insight/Judgement:   Absent/limited      No flowsheet data found.   PHQ-9 Questionaire 6/4/2021 2/22/2021   Little interest or pleasure in doing things 0 0   Feeling down, depressed, or hopeless 0 0   PHQ-9 Total Score 0 0        AIMS Scale  Facial and Oral

## 2022-07-28 ENCOUNTER — OFFICE VISIT (OUTPATIENT)
Dept: PSYCHIATRY | Age: 57
End: 2022-07-28
Payer: MEDICARE

## 2022-07-28 VITALS — HEART RATE: 91 BPM | SYSTOLIC BLOOD PRESSURE: 104 MMHG | DIASTOLIC BLOOD PRESSURE: 68 MMHG

## 2022-07-28 DIAGNOSIS — F84.0 AUTISM: Primary | ICD-10-CM

## 2022-07-28 DIAGNOSIS — F42.2 MIXED OBSESSIONAL THOUGHTS AND ACTS: ICD-10-CM

## 2022-07-28 PROCEDURE — G8417 CALC BMI ABV UP PARAM F/U: HCPCS | Performed by: STUDENT IN AN ORGANIZED HEALTH CARE EDUCATION/TRAINING PROGRAM

## 2022-07-28 PROCEDURE — G8427 DOCREV CUR MEDS BY ELIG CLIN: HCPCS | Performed by: STUDENT IN AN ORGANIZED HEALTH CARE EDUCATION/TRAINING PROGRAM

## 2022-07-28 PROCEDURE — 3017F COLORECTAL CA SCREEN DOC REV: CPT | Performed by: STUDENT IN AN ORGANIZED HEALTH CARE EDUCATION/TRAINING PROGRAM

## 2022-07-28 PROCEDURE — 99214 OFFICE O/P EST MOD 30 MIN: CPT | Performed by: STUDENT IN AN ORGANIZED HEALTH CARE EDUCATION/TRAINING PROGRAM

## 2022-07-28 PROCEDURE — 1036F TOBACCO NON-USER: CPT | Performed by: STUDENT IN AN ORGANIZED HEALTH CARE EDUCATION/TRAINING PROGRAM

## 2022-07-28 RX ORDER — SERTRALINE HYDROCHLORIDE 100 MG/1
TABLET, FILM COATED ORAL
Qty: 30 TABLET | Refills: 3 | Status: SHIPPED | OUTPATIENT
Start: 2022-07-28

## 2022-07-28 RX ORDER — PERPHENAZINE 2 MG/1
2 TABLET, FILM COATED ORAL NIGHTLY
Qty: 90 TABLET | Refills: 1 | Status: SHIPPED | OUTPATIENT
Start: 2022-07-28 | End: 2022-10-24

## 2022-07-28 RX ORDER — CLOMIPRAMINE HYDROCHLORIDE 50 MG/1
CAPSULE ORAL
Qty: 60 CAPSULE | Refills: 3 | Status: SHIPPED | OUTPATIENT
Start: 2022-07-28

## 2022-08-02 ENCOUNTER — TELEPHONE (OUTPATIENT)
Dept: INTERNAL MEDICINE CLINIC | Age: 57
End: 2022-08-02

## 2022-08-02 DIAGNOSIS — T78.2XXS ANAPHYLAXIS, SEQUELA: ICD-10-CM

## 2022-08-02 RX ORDER — EPINEPHRINE 0.3 MG/.3ML
INJECTION SUBCUTANEOUS
Qty: 2 EACH | Refills: 0 | Status: SHIPPED | OUTPATIENT
Start: 2022-08-02 | End: 2022-08-11 | Stop reason: SDUPTHER

## 2022-08-02 NOTE — TELEPHONE ENCOUNTER
Harinder Mckinney with [facility] id requesting we fax over a new RX for Epipen to [facility] at [phone]. Can we fax over 2 that way they can make sure they get to both day programs patient is involved in? Please call Harinder Mckinney with any questions/concerns/updates.

## 2022-08-11 ENCOUNTER — HOSPITAL ENCOUNTER (OUTPATIENT)
Dept: GENERAL RADIOLOGY | Age: 57
Discharge: HOME | End: 2022-08-11

## 2022-08-11 ENCOUNTER — OFFICE VISIT (OUTPATIENT)
Dept: INTERNAL MEDICINE CLINIC | Age: 57
End: 2022-08-11
Payer: MEDICARE

## 2022-08-11 ENCOUNTER — HOSPITAL ENCOUNTER (OUTPATIENT)
Age: 57
Discharge: HOME | End: 2022-08-11

## 2022-08-11 VITALS
BODY MASS INDEX: 32.34 KG/M2 | WEIGHT: 212.7 LBS | OXYGEN SATURATION: 97 % | HEART RATE: 78 BPM | DIASTOLIC BLOOD PRESSURE: 76 MMHG | SYSTOLIC BLOOD PRESSURE: 128 MMHG

## 2022-08-11 DIAGNOSIS — M16.12 OSTEOARTHRITIS OF LEFT HIP, UNSPECIFIED OSTEOARTHRITIS TYPE: ICD-10-CM

## 2022-08-11 DIAGNOSIS — T78.2XXS ANAPHYLAXIS, SEQUELA: ICD-10-CM

## 2022-08-11 DIAGNOSIS — M25.552 CHRONIC HIP PAIN, LEFT: Primary | ICD-10-CM

## 2022-08-11 DIAGNOSIS — G89.29 CHRONIC HIP PAIN, LEFT: Primary | ICD-10-CM

## 2022-08-11 PROCEDURE — G8427 DOCREV CUR MEDS BY ELIG CLIN: HCPCS | Performed by: INTERNAL MEDICINE

## 2022-08-11 PROCEDURE — 99213 OFFICE O/P EST LOW 20 MIN: CPT | Performed by: INTERNAL MEDICINE

## 2022-08-11 PROCEDURE — 1036F TOBACCO NON-USER: CPT | Performed by: INTERNAL MEDICINE

## 2022-08-11 PROCEDURE — 3017F COLORECTAL CA SCREEN DOC REV: CPT | Performed by: INTERNAL MEDICINE

## 2022-08-11 PROCEDURE — G8417 CALC BMI ABV UP PARAM F/U: HCPCS | Performed by: INTERNAL MEDICINE

## 2022-08-11 RX ORDER — EPINEPHRINE 0.3 MG/.3ML
INJECTION SUBCUTANEOUS
Qty: 4 EACH | Refills: 0 | Status: SHIPPED | OUTPATIENT
Start: 2022-08-11 | End: 2022-11-04 | Stop reason: SDUPTHER

## 2022-08-11 SDOH — ECONOMIC STABILITY: FOOD INSECURITY: WITHIN THE PAST 12 MONTHS, THE FOOD YOU BOUGHT JUST DIDN'T LAST AND YOU DIDN'T HAVE MONEY TO GET MORE.: NEVER TRUE

## 2022-08-11 SDOH — ECONOMIC STABILITY: FOOD INSECURITY: WITHIN THE PAST 12 MONTHS, YOU WORRIED THAT YOUR FOOD WOULD RUN OUT BEFORE YOU GOT MONEY TO BUY MORE.: NEVER TRUE

## 2022-08-11 ASSESSMENT — PATIENT HEALTH QUESTIONNAIRE - PHQ9: DEPRESSION UNABLE TO ASSESS: FUNCTIONAL CAPACITY MOTIVATION LIMITS ACCURACY

## 2022-08-11 ASSESSMENT — SOCIAL DETERMINANTS OF HEALTH (SDOH): HOW HARD IS IT FOR YOU TO PAY FOR THE VERY BASICS LIKE FOOD, HOUSING, MEDICAL CARE, AND HEATING?: NOT HARD AT ALL

## 2022-08-11 NOTE — PROGRESS NOTES
Priscilla Lpoez (:  1965) is a 60 y.o. female,Established patient, here for evaluation of the following chief complaint(s):  Hip Pain      Vitals:    22 1105   BP: 128/76   Pulse: 78   SpO2: 97%        ASSESSMENT/PLAN:  1. Chronic hip pain, left  Patient with worsening left hip pain. Patient has not yet obtained x-ray of the left hip, made an appointment with orthopedic surgeon or tried recommendations from last visit.  -Patient to get x-ray of hip  -Educated to schedule with orthopedic surgeon Dr. Chance Fajardo  -Try Salonpas lidocaine patches and Voltaren gel for pain, if this is not working we will have to discuss tramadol as a possibility with patient's POA  2. Anaphylaxis, sequela  -     EPINEPHrine (EPIPEN 2-JAG) 0.3 MG/0.3ML SOAJ injection; USE AS DRECTED. TAKE WITH ON ALL OUTINGS., Disp-4 each, R-0Normal      Return in about 4 weeks (around 2022). SUBJECTIVE/OBJECTIVE:  HPI    Patient is a 78-year-old female with past medical history of CKD, OCD, prediabetes, hyperlipidemia and chronic left hip pain who presents for follow-up for left hip pain. Patient is nonverbal and history is obtained from patient's aide. It appears the patient's continuing to have hip pain. Staff feels that it is worsening. Unfortunately home care staff has not implemented any of the recommendations that I made at patient's last appointment. She has not yet had her x-ray secondary to difficulty obtaining this and needing help from patient's family. They have not tried Salonpas lidocaine patches or Voltaren gel. They have also not made a follow-up with patient's orthopedic surgeon Dr. Chance Fajardo. Review of Systems ROS negative except for those noted in the HPI above. Vitals:    22 1105   BP: 128/76   Pulse: 78   SpO2: 97%     Physical Exam  Constitutional:       General: She is not in acute distress. Appearance: Normal appearance. She is not ill-appearing.    HENT:      Head: Normocephalic and atraumatic. Right Ear: External ear normal.      Left Ear: External ear normal.   Eyes:      General: No scleral icterus. Extraocular Movements: Extraocular movements intact. Conjunctiva/sclera: Conjunctivae normal.   Cardiovascular:      Rate and Rhythm: Normal rate and regular rhythm. Heart sounds: No murmur heard. No friction rub. No gallop. Pulmonary:      Effort: Pulmonary effort is normal. No respiratory distress. Breath sounds: Normal breath sounds. No wheezing, rhonchi or rales. Musculoskeletal:      Cervical back: No muscular tenderness. Right lower leg: No edema. Left lower leg: No edema. Skin:     General: Skin is warm. Findings: No erythema or rash. Neurological:      Mental Status: She is alert. Mental status is at baseline. Gait: Gait abnormal (antalgic gait. using wheel chair). An electronic signature was used to authenticate this note.     --Otoniel Webb DO

## 2022-08-15 ENCOUNTER — TELEPHONE (OUTPATIENT)
Dept: INTERNAL MEDICINE CLINIC | Age: 57
End: 2022-08-15

## 2022-08-15 NOTE — TELEPHONE ENCOUNTER
Patient's guardian Liyah Eid is requesting to speak with Dr. Vana Krabbe regarding patient not being able to get her Xray, and needing to have a mobile unit come to her home. Please contact her @ phone # provided.

## 2022-08-17 NOTE — TELEPHONE ENCOUNTER
Kaitlyn Pierre is calling back to speak with Dr. Rufino Masters about Zan Sebastian. Please contact her at number provided when possible.

## 2022-08-18 DIAGNOSIS — G89.29 CHRONIC HIP PAIN, LEFT: Primary | ICD-10-CM

## 2022-08-18 DIAGNOSIS — M25.552 CHRONIC HIP PAIN, LEFT: Primary | ICD-10-CM

## 2022-08-18 RX ORDER — TRAMADOL HYDROCHLORIDE 50 MG/1
50 TABLET ORAL 2 TIMES DAILY
Qty: 60 TABLET | Refills: 0 | Status: SHIPPED | OUTPATIENT
Start: 2022-08-18 | End: 2022-09-08

## 2022-08-18 NOTE — TELEPHONE ENCOUNTER
Talked with Alyssa Palomares, patients POA and sister. She would like to attempt a mobile xray since apple gets very agitated and nervous when attempting xray. They have tried 2 times without success. Will reach out to our care coordinator to see how to go forward with this. Alyssa Palomares would also like to try a stronger medication for apple's pain. She has noticed apple being able to do less and less activity secondary to pain including walking steps. She also makes sounds in pain much more frequently. Will try very low doses of tramadol instead of diclofenac. Please call Mony at group home to update her on this. Particularly that we will work to get the mobile xray and to stop her diclofenac and start tramadol. (Order will be sent to pharmacy). Please ask if she needs faxed script saying to dc diclofenac.

## 2022-08-18 NOTE — TELEPHONE ENCOUNTER
Spoke to Don kilgore  she stated that they need a DC order and a Start order for the medications ans that patient is not moving the leg still . Khalida Cortez also asked about possible sedation for the patient during the Xray ?

## 2022-08-18 NOTE — PROGRESS NOTES
Discussed codeine allergy with Aron Johnson patient's sister and POA. Codeine allergy was a rash. Discussed that there is a possibility for cross-reactivity. Patient's sister denies that there was any anaphylaxis associated with codeine. We will try a tramadol. Patient POA okay with risk.

## 2022-08-22 ENCOUNTER — TELEPHONE (OUTPATIENT)
Dept: INTERNAL MEDICINE CLINIC | Age: 57
End: 2022-08-22

## 2022-08-22 NOTE — TELEPHONE ENCOUNTER
We were able to identify two possible mobile xray companies please see below      [employer]   [phone]   No stairs but they will use elevators if they are large enough, they will not go out after dark or if they cannot make verbal contact with patient to confirm their arrival, need order faxed over to them from ordering MD, patients insurance info     OR   [facility] [phone] - you have to call in the order, plus fax over an order, documentation, insurance, etc... They cannot service homes with multiple steps to enter or apartment buildings with elevators due to the weight of their equipment. Please call [facility] and find out what the situation is with steps vs elevator at patients home or if patients sister's home may be an option. Or if they could do it on the first floor of their home.

## 2022-08-22 NOTE — TELEPHONE ENCOUNTER
Spoke with Thi Wells, she said either place is fine with her. There is no stairs at all, it is one floor.

## 2022-08-23 DIAGNOSIS — M16.12 OSTEOARTHRITIS OF LEFT HIP, UNSPECIFIED OSTEOARTHRITIS TYPE: Primary | ICD-10-CM

## 2022-08-23 NOTE — TELEPHONE ENCOUNTER
Lets go forward with [facility] [phone] . Could we please fax the order for left hip xray over. Thank you.

## 2022-08-24 ENCOUNTER — TELEPHONE (OUTPATIENT)
Dept: INTERNAL MEDICINE CLINIC | Age: 57
End: 2022-08-24

## 2022-08-24 NOTE — TELEPHONE ENCOUNTER
Spoke with Gracie Iyer who states patient will not lay still is the problem and she will curl up under her blankets and Hide and try to squirm around and hit.

## 2022-08-24 NOTE — TELEPHONE ENCOUNTER
Pamela Mary calling regarding patient's mobile Xray scheduled for today. She states that she needs to be sedated for this or she \"will fight them. \"     They do not have any medications besides Benadryl to give patient. Is this OK? Please call Justin Retana back at number provided to confirm/deny.

## 2022-08-24 NOTE — TELEPHONE ENCOUNTER
Please tell farideh that I do not feel comfortable sedating patient for an xray.  Please have them try at home first before adding sedation

## 2022-08-24 NOTE — TELEPHONE ENCOUNTER
Spoke with Elva Schuler advised her of Dr Cleo Mitchell message. She was unsatisfied with this answer.  I then told Elva Schuler per Dr Ac Sweeney we will not be giving any sedation with out speaking with POA first. And she would like for them to try obtain at home

## 2022-08-24 NOTE — TELEPHONE ENCOUNTER
Joan Grady calling to request a copy of the prescription be faxed to her:    traMADol (ULTRAM) 50 MG tablet    They have not received the medication yet and want a record of the prescription. Please fax to: [phone]    Please contact Licha Herrmann with any further questions/concerns.

## 2022-08-24 NOTE — TELEPHONE ENCOUNTER
I do not think sedating patient is the right way to proceed for this. Will she lay in her bed if she has a good support system with her?

## 2022-08-25 ENCOUNTER — TELEPHONE (OUTPATIENT)
Dept: INTERNAL MEDICINE CLINIC | Age: 57
End: 2022-08-25

## 2022-08-25 NOTE — TELEPHONE ENCOUNTER
Michael Andujar at [employer] calling to let Dr. Daisy Hernandez know she is faxing over a left hip Xray for patient. Please contact Michael Andujar at number provided with further questions.

## 2022-09-02 ENCOUNTER — TELEPHONE (OUTPATIENT)
Dept: INTERNAL MEDICINE CLINIC | Age: 57
End: 2022-09-02

## 2022-09-02 NOTE — TELEPHONE ENCOUNTER
Dorothy from [facility] is calling to speak with Dr. Stan Sams. She states that patient has started scratching badly at her left arm. She is considered that patient may be allergic to the medication traMADol (ULTRAM) 50 MG tablet that was prescribed 08/18/22. Please contact Dorothy at number provided to advise.

## 2022-09-02 NOTE — TELEPHONE ENCOUNTER
Please call and find out if she has rash or swelling or any other allergic symptoms. Have them hold tramadol over the weekend to see if this improves symptoms and have them call with update on Monday. Any sever allergic symptoms should go to ER.

## 2022-09-08 ENCOUNTER — TELEPHONE (OUTPATIENT)
Dept: INTERNAL MEDICINE CLINIC | Age: 57
End: 2022-09-08

## 2022-09-08 ENCOUNTER — TELEPHONE (OUTPATIENT)
Dept: GYNECOLOGY | Age: 57
End: 2022-09-08

## 2022-09-08 ENCOUNTER — OFFICE VISIT (OUTPATIENT)
Dept: INTERNAL MEDICINE CLINIC | Age: 57
End: 2022-09-08
Payer: MEDICARE

## 2022-09-08 VITALS
SYSTOLIC BLOOD PRESSURE: 129 MMHG | HEART RATE: 77 BPM | WEIGHT: 219 LBS | HEIGHT: 70 IN | OXYGEN SATURATION: 98 % | DIASTOLIC BLOOD PRESSURE: 76 MMHG | BODY MASS INDEX: 31.35 KG/M2

## 2022-09-08 DIAGNOSIS — M25.552 CHRONIC HIP PAIN, LEFT: Primary | ICD-10-CM

## 2022-09-08 DIAGNOSIS — G89.29 CHRONIC HIP PAIN, LEFT: Primary | ICD-10-CM

## 2022-09-08 DIAGNOSIS — N18.30 STAGE 3 CHRONIC KIDNEY DISEASE, UNSPECIFIED WHETHER STAGE 3A OR 3B CKD (HCC): ICD-10-CM

## 2022-09-08 DIAGNOSIS — M16.10 HIP ARTHRITIS: Primary | ICD-10-CM

## 2022-09-08 DIAGNOSIS — N18.30 STAGE 3 CHRONIC KIDNEY DISEASE, UNSPECIFIED WHETHER STAGE 3A OR 3B CKD (HCC): Primary | ICD-10-CM

## 2022-09-08 PROCEDURE — 1036F TOBACCO NON-USER: CPT | Performed by: INTERNAL MEDICINE

## 2022-09-08 PROCEDURE — G8417 CALC BMI ABV UP PARAM F/U: HCPCS | Performed by: INTERNAL MEDICINE

## 2022-09-08 PROCEDURE — G8427 DOCREV CUR MEDS BY ELIG CLIN: HCPCS | Performed by: INTERNAL MEDICINE

## 2022-09-08 PROCEDURE — 3017F COLORECTAL CA SCREEN DOC REV: CPT | Performed by: INTERNAL MEDICINE

## 2022-09-08 PROCEDURE — 99214 OFFICE O/P EST MOD 30 MIN: CPT | Performed by: INTERNAL MEDICINE

## 2022-09-08 NOTE — TELEPHONE ENCOUNTER
Can you ask them what they need? Do they need copy of pap smear? I do not understand what they need.

## 2022-09-08 NOTE — TELEPHONE ENCOUNTER
Dave Barrett with [facility] calling back to state patient is still experiencing a rash from the traMADol (ULTRAM) 50 MG tablet. She states it has caused hives/itching. Can patient stop taking this? Please advise at appointment today.

## 2022-09-08 NOTE — PROGRESS NOTES
Yandy Gibbs (:  1965) is a 60 y.o. female,Established patient, here for evaluation of the following chief complaint(s):  No chief complaint on file. ASSESSMENT/PLAN:  1. Chronic hip pain, left  Patient with significant arthritis. Patient had reaction to tramadol therefore it has been discontinued. Would like to find another medication to help manage patient's pain. Unfortunately she has CKD and NSAIDs are risky. Tylenol has not controlled her pain in the past.  And she has allergy to codeine. Discussed possible NSAIDs with POA. We may have to weigh risk versus benefit in this case. Would like to check patient's kidney function prior to doing this. If kidney function is stable may consider starting Celebrex. If POA agrees to this plan, she will have to understand that this is for comfort considering patient is not a good candidate for hip replacement. However, it may cause significant worsening of her kidney function that could lead to death down the road if it becomes severe. I have briefly discussed this with patient's POA. We will discuss in more detail before this type of medication would be started. Patient also needs to see orthopedic surgery to see if there is any intervention such as injections that can be completed. Patient can try Voltaren gel in the meantime as well. 2. Stage 3 chronic kidney disease, unspecified whether stage 3a or 3b CKD ([facility] [address]  Obtain CMP. Return in about 4 weeks (around 10/6/2022). SUBJECTIVE/OBJECTIVE:  HPI    Patient is a 40-year-old female with past medical history of CKD, obesity, chronic arthritis who presents for follow-up secondary to her hip pain. Patient nonverbal.  Most of history was obtained from aide that was with her. Patient continues to have left hip pain. X-ray did show significant degenerative disease. Patient had a reaction to tramadol. She does have a history of a codeine allergy with rash.   It appears the patient also has an allergy to tramadol. Patient began itching all over. Patient has not seen orthopedic surgery. Fortunately POA was not available at the time of appointment however I was able to speak with her after the appointment was over. Review of Systems ROS negative except for those noted in the HPI above. Vitals:    09/08/22 1058   BP: 129/76   Pulse: 77   SpO2: 98%         Physical Exam  Constitutional:       General: She is not in acute distress. Appearance: Normal appearance. She is not ill-appearing. HENT:      Head: Normocephalic and atraumatic. Eyes:      Extraocular Movements: Extraocular movements intact. Conjunctiva/sclera: Conjunctivae normal.   Cardiovascular:      Rate and Rhythm: Normal rate and regular rhythm. Heart sounds: No murmur heard. No friction rub. No gallop. Pulmonary:      Effort: Pulmonary effort is normal. No respiratory distress. Breath sounds: Normal breath sounds. No wheezing, rhonchi or rales. Skin:     Comments: Scratches over arms   Neurological:      Mental Status: She is alert. Mental status is at baseline. An electronic signature was used to authenticate this note.     --Ishaan Scott, DO

## 2022-09-08 NOTE — TELEPHONE ENCOUNTER
Requesting after visit summerlars with Dr Estanislao Nissen be sent for patient.      Was seen in 2021 with [facility]   Belenda Goldmann [phone]    Fax [phone]

## 2022-09-08 NOTE — TELEPHONE ENCOUNTER
Patient was suppose to have held this medication last week. Will discuss this with patient at appointment , patient just arrived.

## 2022-09-08 NOTE — TELEPHONE ENCOUNTER
Please reach out to [facility] and let her know I would like patient to get lab work completed. Please have them bring patient in to get these labs drawn.

## 2022-09-08 NOTE — TELEPHONE ENCOUNTER
Talked with patients sister and POA about next steps. Patients sister to schedule patient with ortho. Will need labs before starting NSAIDs.

## 2022-09-12 ENCOUNTER — TELEPHONE (OUTPATIENT)
Dept: INTERNAL MEDICINE CLINIC | Age: 57
End: 2022-09-12

## 2022-09-12 NOTE — TELEPHONE ENCOUNTER
Matt Dunham with [address] [facility] is calling regarding the order she received today for patient medication:   acetaminophen (ACETAMINOPHEN EXTRA STRENGTH) 500 MG tablet    She states that the prescription instructions say: Take 2 tablets by mouth every 8 hours as needed for Pain or Fever but that the order she received from Dr. Lynsey Carnes today stated to take the acetaminophen 3x daily so she would like clarification and a new order faxed with the instructions.     Please contact Matt Dunham with any additional questions at:   [phone]

## 2022-09-19 DIAGNOSIS — G89.29 CHRONIC HIP PAIN, LEFT: Primary | ICD-10-CM

## 2022-09-19 DIAGNOSIS — M25.552 CHRONIC HIP PAIN, LEFT: Primary | ICD-10-CM

## 2022-09-19 RX ORDER — LIDOCAINE 4 G/G
1 PATCH TOPICAL DAILY
Qty: 30 PATCH | Refills: 0 | Status: SHIPPED | OUTPATIENT
Start: 2022-09-19 | End: 2022-10-17

## 2022-09-22 ENCOUNTER — TELEPHONE (OUTPATIENT)
Dept: INTERNAL MEDICINE CLINIC | Age: 57
End: 2022-09-22

## 2022-09-22 NOTE — TELEPHONE ENCOUNTER
Nuno Mclain with [facility] [facility] is calling regarding the following prescription for patient:  diclofenac sodium (VOLTAREN) 1 % GEL  She states the instructions state to \"Apply topically\" but they do not state where to apply. She also stated that patient's sister made her an appointment at [facility] to see a pain specialist and the specialist stated that the Voltaren will not work on patient's hip. They also discussed implanting a pain device for patient. Please contact Nuno Mclain back to discuss.     Work phone #:[phone] ext 6686 [facility]  Cell #: [phone]

## 2022-09-29 ENCOUNTER — TELEPHONE (OUTPATIENT)
Dept: INTERNAL MEDICINE CLINIC | Age: 57
End: 2022-09-29

## 2022-09-29 DIAGNOSIS — M25.552 CHRONIC HIP PAIN, LEFT: Primary | ICD-10-CM

## 2022-09-29 DIAGNOSIS — G89.29 CHRONIC HIP PAIN, LEFT: Primary | ICD-10-CM

## 2022-09-29 NOTE — TELEPHONE ENCOUNTER
Spoke with Home healthcare nurse Keegan Jacobson to see which location to fax Rx for wheelchair .  Faxed to [address] location

## 2022-09-29 NOTE — TELEPHONE ENCOUNTER
Chaparro Herman from pt group home called regarding pt. They need Dr. Ruddy Rosado to send a rx for a wheelchair for pt. Please fax to [facility] pharmacy. Please contact Linda with any questions at [phone].

## 2022-10-04 ENCOUNTER — TELEPHONE (OUTPATIENT)
Dept: GYNECOLOGY | Age: 57
End: 2022-10-04

## 2022-10-04 NOTE — TELEPHONE ENCOUNTER
They were calling to see how often patient needs to see Dr. Prentis Lanes, her gynecologist, last time examined was under sedation

## 2022-10-05 NOTE — TELEPHONE ENCOUNTER
Called and spoke to [facility] informed her that Dr Demond Quiroz does not need to see her until 2024 but she needs a mammogram every year.

## 2022-10-17 ENCOUNTER — TELEPHONE (OUTPATIENT)
Dept: INTERNAL MEDICINE CLINIC | Age: 57
End: 2022-10-17

## 2022-10-17 DIAGNOSIS — M25.552 CHRONIC HIP PAIN, LEFT: ICD-10-CM

## 2022-10-17 DIAGNOSIS — G89.29 CHRONIC HIP PAIN, LEFT: ICD-10-CM

## 2022-10-17 RX ORDER — LIDOCAINE 4 G/100G
PATCH TOPICAL
Qty: 30 PATCH | Refills: 11 | Status: SHIPPED | OUTPATIENT
Start: 2022-10-17

## 2022-10-17 NOTE — TELEPHONE ENCOUNTER
Omayra Trimble is calling to let Dr. Garfield Nam know the Lidocaine patches are working-1 left. Pharmacy will be sending over request for a re-fill on the patches. Please re-fill.

## 2022-10-19 ENCOUNTER — TELEPHONE (OUTPATIENT)
Dept: INTERNAL MEDICINE CLINIC | Age: 57
End: 2022-10-19

## 2022-10-19 NOTE — TELEPHONE ENCOUNTER
Mreline Del Rio is calling with questions about the   acetaminophen (ACETAMINOPHEN EXTRA STRENGTH) 500 MG tablet prescription. She states patient is currently taking  one 500 MG tablet every 8 hours. She is wondering if patient is supposed to be taking two 500 MG tablets every 8 hours. Merline Del Rio states patient seems to be doing OK with just one 500 MG.  Please contact Merline Del Rio to further discuss at [phone]

## 2022-10-22 DIAGNOSIS — F42.2 MIXED OBSESSIONAL THOUGHTS AND ACTS: ICD-10-CM

## 2022-10-24 DIAGNOSIS — F42.2 MIXED OBSESSIONAL THOUGHTS AND ACTS: ICD-10-CM

## 2022-10-24 RX ORDER — PERPHENAZINE 2 MG/1
TABLET ORAL
Qty: 30 TABLET | Refills: 0 | OUTPATIENT
Start: 2022-10-24

## 2022-10-24 RX ORDER — PERPHENAZINE 2 MG/1
TABLET ORAL
Qty: 90 TABLET | Refills: 1 | Status: SHIPPED | OUTPATIENT
Start: 2022-10-24

## 2022-11-04 ENCOUNTER — TELEPHONE (OUTPATIENT)
Dept: INTERNAL MEDICINE CLINIC | Age: 57
End: 2022-11-04

## 2022-11-04 DIAGNOSIS — T78.2XXS ANAPHYLAXIS, SEQUELA: ICD-10-CM

## 2022-11-04 DIAGNOSIS — M25.552 CHRONIC HIP PAIN, LEFT: ICD-10-CM

## 2022-11-04 DIAGNOSIS — G89.29 CHRONIC HIP PAIN, LEFT: ICD-10-CM

## 2022-11-04 RX ORDER — ACETAMINOPHEN 500 MG
1000 TABLET ORAL 2 TIMES DAILY PRN
Qty: 180 TABLET | Refills: 0 | Status: SHIPPED | OUTPATIENT
Start: 2022-11-04 | End: 2022-11-07

## 2022-11-04 RX ORDER — EPINEPHRINE 0.3 MG/.3ML
INJECTION SUBCUTANEOUS
Qty: 2 EACH | Refills: 0 | Status: SHIPPED | OUTPATIENT
Start: 2022-11-04

## 2022-11-04 NOTE — TELEPHONE ENCOUNTER
Omyara Trimble from [facility] is calling to request that patient have a prescription for 2 packs of Epi pens called in. Please call in to:  [facility] [address] [facility] [address] [facility] [phone] - F [phone]   [facility] [place] [address]   Phone:  [phone]  Fax:  [phone]    Omayra Trimble would also requests that the prescription for  acetaminophen (ACETAMINOPHEN EXTRA STRENGTH) 500 MG tablet states that it is for patient's hip pain.      Please fax a copy of prescriptions to Omayra Trimble when possible at [phone]

## 2022-11-04 NOTE — TELEPHONE ENCOUNTER
Josse Aguilar would also requests that the prescription for  acetaminophen (ACETAMINOPHEN EXTRA STRENGTH) 500 MG tablet states that it is for patient's hip pain.       Please fax a copy of prescriptions to Josse Aguilar when possible at [phone]

## 2022-11-04 NOTE — TELEPHONE ENCOUNTER
Please let colton kilgore know that script for tylenol was sent and updated to [facility]

## 2022-11-07 ENCOUNTER — PATIENT MESSAGE (OUTPATIENT)
Dept: INTERNAL MEDICINE CLINIC | Age: 57
End: 2022-11-07

## 2022-11-07 DIAGNOSIS — M25.552 CHRONIC HIP PAIN, LEFT: ICD-10-CM

## 2022-11-07 DIAGNOSIS — G89.29 CHRONIC HIP PAIN, LEFT: ICD-10-CM

## 2022-11-07 RX ORDER — ACETAMINOPHEN 500 MG
500 TABLET ORAL 3 TIMES DAILY PRN
Qty: 180 TABLET | Refills: 0 | Status: SHIPPED | OUTPATIENT
Start: 2022-11-07 | End: 2022-11-07

## 2022-11-07 RX ORDER — ACETAMINOPHEN 500 MG
TABLET ORAL
Qty: 180 TABLET | Refills: 0 | Status: SHIPPED | OUTPATIENT
Start: 2022-11-07

## 2022-11-07 NOTE — TELEPHONE ENCOUNTER
Called and left a VM for Shonda Nuñez to call our office to ask what specific information does she need from her visit with Dr Deborah Gutierrez. Received a referral:  from Care Coordinator to review patients medications. Called patient to schedule a time to speak with a pharmacist over the telephone. Verified with PharmD he could call her today at 4:30pm.  Patient asks that you speak through voice mail recording and she will eventually answer. Delia Coker [facility].    [address] [facility] free: [address] in place:  No  Recommendation Provided To: Patient/Caregiver: 1 via Telephone  Intervention Detail: Scheduled Appointment  Gap Closed?: Yes   Intervention Accepted By: Patient/Caregiver: 1  Time Spent (min): 15

## 2022-11-08 ENCOUNTER — TELEPHONE (OUTPATIENT)
Dept: INTERNAL MEDICINE CLINIC | Age: 57
End: 2022-11-08

## 2022-11-08 NOTE — TELEPHONE ENCOUNTER
Declan Bass at [facility] is requesting that the prescription instructions for acetaminophen (ACETAMINOPHEN EXTRA STRENGTH) 500 MG tablet be changed to \"Take one tablet at 7am , 3 pm, and 9pm for hip pain. Please send script with new instructions to:  Gladis [facility] [phone] - F [phone]   [address] [place] [address]   Phone:  [phone]  Fax:  [phone]    Declan Bass is also requesting a fax be sent to her of new prescription as well as a copy of the previous prescription for acetaminophen (ACETAMINOPHEN EXTRA STRENGTH) 500 MG tablet (Discontinued) that has the instructions stating \"Take 1 tablet by mouth 3 times daily as needed for Pain (hip pain)\"    Please contact her with any questions.  Please fax to:  [phone]

## 2022-11-22 DIAGNOSIS — F42.2 MIXED OBSESSIONAL THOUGHTS AND ACTS: ICD-10-CM

## 2022-11-27 NOTE — PROGRESS NOTES
PSYCHIATRY PROGRESS NOTE    Doretha Mendez  1965 11/28/22  Face to Face time: 25 minutes  PCP: Maria Teresa Monique DO    CC:   Chief Complaint   Patient presents with    Follow-up     Patient is a 53-year-old female with a past medical history significant of autism, prediabetes, mixed hyperlipidemia who presents to the outpatient psychiatric clinic today for evaluation and management of OCD and autistic behaviors. A:  Patient's presentation today is indicative of continued symptoms of OCD that are incompletely covered with the current medication regimen. That said, the level of distress that it causes is relatively minimal and it is easily manageable with behavioral interventions from her caregiving staff. We will continue to monitor for additional changes that may be needed. Diagnosis:  Autism, level 3  OCD    P:   We will plan to maintain the patient's current regimen of perphenazine 2mg nightly, clomipramine 100mg nightly, and sertraline 100mg daily. Medication Monitoring:    - PDMP reviewed: No current prescriptions     Follow-up: 4 months    Safety: Pt was counseled on the potential for increased suicidal ideations and advised on potential options for dealing with these including hotlines, calling the office, or going to the nearest emergency room. __________________________________________________________________________    S:   The patient remains nonverbal at this time. Per accompanying caregiving staff, there are few concerns at this time. She is noted to be doing well, enjoys playing puzzles and eating. Sometimes can get a little obsessive over food items including those of other people. This is something that her caregiving staff have worked on with her. She continues to go to her day program, and apparently they don't get reports from there about how their residents do. Other than the OCD behaviors, no other issues to report.     Staff members have reported no concerns for self-harming behaviors, threatening behaviors towards others. ROS:  Review of Systems   Constitutional: Negative. HENT: Negative. Eyes: Negative. Respiratory: Negative. Cardiovascular: Negative. Gastrointestinal: Negative. Endocrine: Negative. Genitourinary: Negative. Musculoskeletal: Negative. Skin: Negative. Allergic/Immunologic: Negative. Neurological: Negative. Hematological: Negative. Psychiatric/Behavioral:  Positive for behavioral problems. Brief Medical Hx:   Patient Active Problem List   Diagnosis    OCD (obsessive compulsive disorder)    Autism    Obesity (BMI 30-39. 9)    Chronic hip pain, left    Dependent edema    Urge incontinence    Mixed hyperlipidemia    Pre-diabetes    Chronic [facility] disease, stage III ([facility] [[id number]]        Brief Psych Hx:  Hosp: Denied  Diagnoses: OCD, autism spectrum w/ moderate MR  Med trials: Diazepam,  Outpt: Previously saw [facility] for psychiatric care  NSSI: Denied  Suicide Attempts: None per records    O:  Wt Readings from Last 3 Encounters:   11/28/22 222 lb 3.2 oz (100.8 kg)   09/08/22 219 lb (99.3 kg)   08/11/22 212 lb 11.2 oz (96.5 kg)       Vitals:    11/28/22 1014   BP: 108/62   Pulse: 73   SpO2: 95%   Weight: 222 lb 3.2 oz (100.8 kg)       Mental Status Exam:   Appearance:    Appropriately dressed  Motor: No abnormal movements, tics or mannerisms.   Eye Contact: Poor  Speech:    Nonverbal  Language:   Nonverbal  Mood/Affect:  Unable to state/ full range of affect seen  Thought Process:   Obsessionality noted as she readjusted things on writer's computer workstation several times during the evaluation  Thought Content:    Unable to discern due to nonverbal state  Hallucinations:   Not seen to be responding to internal stimuli  Associations:   Intact  Attention/Concentration:   Can refocus but generally not attentive during conversation  Orientation:    Alert and oriented to name  Insight/Judgement: Absent/Limited    Patient unable to complete PHQ9/GAD7 due to nonverbal/noncommunicative state. AIMS Scale  Facial and Oral Movements(AIMS)  Muscles of Facial Expression: None, normal  Lips and Perioral Area: None, normal  Jaw: None, normal  Tongue: None, normal  Extremity Movements(AIMS)  Upper (arms, wrists, hands, fingers): None, normal  Lower (legs, knees, ankles, toes): None, normal  Trunk Movements(AIMS)  Neck, shoulders, hips: None, normal  Overall Severity(AIMS)  Severity of abnormal movements (highest score from 1-7): 0  Incapacitation due to abnormal movements: None, normal  Patient's awareness of abnormal movements (rate only patient's report):  No Awareness  Total of items 1-7: 0  Copy above value to this cell: 0     Labs:     Office Visit on 02/10/2022   Component Date Value Ref Range Status    Color, UA 02/10/2022 YELLOW  Straw/Yellow Final    Clarity, UA 02/10/2022 TURBID (A)  Clear Final    Glucose, Ur 02/10/2022 Negative  Negative mg/dL Final    Bilirubin Urine 02/10/2022 Negative  Negative Final    Ketones, Urine 02/10/2022 Negative  Negative mg/dL Final    Specific Gravity, UA 02/10/2022 1.019  1.005 - 1.030 Final    Blood, Urine 02/10/2022 LARGE (A)  Negative Final    pH, UA 02/10/2022 6.0  5.0 - 8.0 Final    Protein, UA 02/10/2022 100 (A)  Negative mg/dL Final    Urobilinogen, Urine 02/10/2022 0.2  <2.0 E.U./dL Final    Nitrite, Urine 02/10/2022 POSITIVE (A)  Negative Final    Leukocyte Esterase, Urine 02/10/2022 LARGE (A)  Negative Final    Microscopic Examination 02/10/2022 YES   Final    Urine Type 02/10/2022 Voided   Final    Urine Reflex to Culture 02/10/2022 Yes   Final    RBC, UA 02/10/2022 21-50 (A)  0 - 4 /HPF Final    Bacteria, UA 02/10/2022 4+ (A)  None Seen /HPF Final    Hyaline Casts, UA 02/10/2022 7  0 - 8 /LPF Final    WBC, UA 02/10/2022 >900 (A)  0 - 5 /HPF Final    Epithelial Cells, UA 02/10/2022 4  0 - 5 /HPF Final    Comment: Urinalysis microscopic performed using the  automated methodology (AUWI analyzer).       Organism 02/10/2022 Klebsiella pneumoniae (A)   Final    Urine Culture, Routine 02/10/2022 >100,000 CFU/ml   Final       EKG: None on file        Rena Serna MD  Psychiatrist

## 2022-11-28 ENCOUNTER — OFFICE VISIT (OUTPATIENT)
Dept: PSYCHIATRY | Age: 57
End: 2022-11-28
Payer: MEDICARE

## 2022-11-28 VITALS
DIASTOLIC BLOOD PRESSURE: 62 MMHG | BODY MASS INDEX: 31.88 KG/M2 | SYSTOLIC BLOOD PRESSURE: 108 MMHG | HEART RATE: 73 BPM | OXYGEN SATURATION: 95 % | WEIGHT: 222.2 LBS

## 2022-11-28 DIAGNOSIS — F84.0 AUTISM: ICD-10-CM

## 2022-11-28 DIAGNOSIS — F42.2 MIXED OBSESSIONAL THOUGHTS AND ACTS: Primary | ICD-10-CM

## 2022-11-28 PROCEDURE — G8484 FLU IMMUNIZE NO ADMIN: HCPCS | Performed by: STUDENT IN AN ORGANIZED HEALTH CARE EDUCATION/TRAINING PROGRAM

## 2022-11-28 PROCEDURE — 3017F COLORECTAL CA SCREEN DOC REV: CPT | Performed by: STUDENT IN AN ORGANIZED HEALTH CARE EDUCATION/TRAINING PROGRAM

## 2022-11-28 PROCEDURE — G8427 DOCREV CUR MEDS BY ELIG CLIN: HCPCS | Performed by: STUDENT IN AN ORGANIZED HEALTH CARE EDUCATION/TRAINING PROGRAM

## 2022-11-28 PROCEDURE — 1036F TOBACCO NON-USER: CPT | Performed by: STUDENT IN AN ORGANIZED HEALTH CARE EDUCATION/TRAINING PROGRAM

## 2022-11-28 PROCEDURE — G8417 CALC BMI ABV UP PARAM F/U: HCPCS | Performed by: STUDENT IN AN ORGANIZED HEALTH CARE EDUCATION/TRAINING PROGRAM

## 2022-11-28 PROCEDURE — 99214 OFFICE O/P EST MOD 30 MIN: CPT | Performed by: STUDENT IN AN ORGANIZED HEALTH CARE EDUCATION/TRAINING PROGRAM

## 2022-11-28 RX ORDER — CLOMIPRAMINE HYDROCHLORIDE 50 MG/1
CAPSULE ORAL
Qty: 180 CAPSULE | Refills: 0 | OUTPATIENT
Start: 2022-11-28

## 2022-11-28 RX ORDER — CLOMIPRAMINE HYDROCHLORIDE 50 MG/1
CAPSULE ORAL
Qty: 60 CAPSULE | Refills: 3 | Status: SHIPPED | OUTPATIENT
Start: 2022-11-28

## 2022-11-28 RX ORDER — SERTRALINE HYDROCHLORIDE 100 MG/1
TABLET, FILM COATED ORAL
Qty: 30 TABLET | Refills: 3 | Status: SHIPPED | OUTPATIENT
Start: 2022-11-28

## 2022-11-28 ASSESSMENT — ENCOUNTER SYMPTOMS
RESPIRATORY NEGATIVE: 1
GASTROINTESTINAL NEGATIVE: 1
EYES NEGATIVE: 1
ALLERGIC/IMMUNOLOGIC NEGATIVE: 1

## 2022-11-28 NOTE — TELEPHONE ENCOUNTER
Medication:   Requested Prescriptions     Pending Prescriptions Disp Refills    clomiPRAMINE (ANAFRANIL) 50 MG capsule [Pharmacy Med Name: CLOMIPRAMINE 50 MG CAP] 180 capsule 0     Sig: TAKE 2 CAPSULES BY MOUTH AT BEDTIME FOR OCD        Last Filled:  7/28/2022    Patient Phone Number: [phone] (home)     Last appt: 7/28/2022   Next appt: 11/28/2022    Last OARRS:   RX Monitoring 11/15/2016   Attestation The Prescription Monitoring Report for this patient was reviewed today. Periodic Controlled Substance Monitoring No signs of potential drug abuse or diversion identified.

## 2023-01-26 ENCOUNTER — OFFICE VISIT (OUTPATIENT)
Dept: INTERNAL MEDICINE CLINIC | Age: 58
End: 2023-01-26

## 2023-01-26 ENCOUNTER — HOSPITAL ENCOUNTER (OUTPATIENT)
Dept: VASCULAR LAB | Age: 58
Discharge: HOME | End: 2023-01-26
Payer: MEDICARE

## 2023-01-26 VITALS
SYSTOLIC BLOOD PRESSURE: 128 MMHG | OXYGEN SATURATION: 98 % | HEART RATE: 102 BPM | TEMPERATURE: 97.3 F | DIASTOLIC BLOOD PRESSURE: 74 MMHG

## 2023-01-26 DIAGNOSIS — M79.604 RIGHT LEG PAIN: ICD-10-CM

## 2023-01-26 DIAGNOSIS — N18.30 STAGE 3 CHRONIC KIDNEY DISEASE, UNSPECIFIED WHETHER STAGE 3A OR 3B CKD (HCC): ICD-10-CM

## 2023-01-26 DIAGNOSIS — Z13.1 SCREENING FOR DIABETES MELLITUS (DM): ICD-10-CM

## 2023-01-26 DIAGNOSIS — M79.89 RIGHT LEG SWELLING: ICD-10-CM

## 2023-01-26 DIAGNOSIS — M79.89 RIGHT LEG SWELLING: Primary | ICD-10-CM

## 2023-01-26 DIAGNOSIS — F33.1 MODERATE EPISODE OF RECURRENT MAJOR DEPRESSIVE DISORDER (HCC): ICD-10-CM

## 2023-01-26 DIAGNOSIS — R73.03 PRE-DIABETES: ICD-10-CM

## 2023-01-26 PROCEDURE — 93971 EXTREMITY STUDY: CPT

## 2023-01-26 ASSESSMENT — PATIENT HEALTH QUESTIONNAIRE - PHQ9
SUM OF ALL RESPONSES TO PHQ QUESTIONS 1-9: 0
4. FEELING TIRED OR HAVING LITTLE ENERGY: 0
7. TROUBLE CONCENTRATING ON THINGS, SUCH AS READING THE NEWSPAPER OR WATCHING TELEVISION: 0
SUM OF ALL RESPONSES TO PHQ QUESTIONS 1-9: 0
2. FEELING DOWN, DEPRESSED OR HOPELESS: 0
5. POOR APPETITE OR OVEREATING: 0
3. TROUBLE FALLING OR STAYING ASLEEP: 0
1. LITTLE INTEREST OR PLEASURE IN DOING THINGS: 0
8. MOVING OR SPEAKING SO SLOWLY THAT OTHER PEOPLE COULD HAVE NOTICED. OR THE OPPOSITE, BEING SO FIGETY OR RESTLESS THAT YOU HAVE BEEN MOVING AROUND A LOT MORE THAN USUAL: 0
SUM OF ALL RESPONSES TO PHQ QUESTIONS 1-9: 0
10. IF YOU CHECKED OFF ANY PROBLEMS, HOW DIFFICULT HAVE THESE PROBLEMS MADE IT FOR YOU TO DO YOUR WORK, TAKE CARE OF THINGS AT HOME, OR GET ALONG WITH OTHER PEOPLE: 0
SUM OF ALL RESPONSES TO PHQ9 QUESTIONS 1 & 2: 0
SUM OF ALL RESPONSES TO PHQ QUESTIONS 1-9: 0
9. THOUGHTS THAT YOU WOULD BE BETTER OFF DEAD, OR OF HURTING YOURSELF: 0

## 2023-01-26 NOTE — PROGRESS NOTES
Lilliam Perez (:  1965) is a 59 y.o. female,Established patient, here for evaluation of the following chief complaint(s):  Leg Swelling (Swollen right thigh )    ASSESSMENT/PLAN:  1. Right leg swelling  Patient with right leg swelling above and below the knee. Patient reaches for my hands when squeezing indicating that it may be causing pain. As noted above patient is nonverbal.  We will check an ultrasound of the right lower extremity stat to rule out clot. Also check CMP and CBC. If ultrasound is normal we will have to consider further imaging.  -     Comprehensive Metabolic Panel; Future  -     CBC with Auto Differential; Future  -     VL Extremity Venous Right; Future  2. Right leg pain  See #1  -     Comprehensive Metabolic Panel; Future  -     CBC with Auto Differential; Future  -     VL Extremity Venous Right; Future  3. Moderate episode of recurrent major depressive disorder (HCC)  Stable currently continue current medications  4. Stage 3 chronic kidney disease, unspecified whether stage 3a or 3b CKD ([facility] [address]  Has been stable but due for labs. Check CMP  -     Comprehensive Metabolic Panel; Future  6. Pre-diabetes  Stable check hemoglobin A1c  -     Hemoglobin A1C; Future    Return in about 4 weeks (around 2023). SUBJECTIVE/OBJECTIVE:  HPI    Patient is a 40-year-old female with past medical history of CKD, OCD, depression, autism and prediabetes who presents secondary to swelling in her right leg. Patient is nonverbal.  All information is gathered from patient's assistant and nursing staff from her group home. Right sided leg swelling above the knee. Unclear how long the swelling is going on. We think around 3 days. Has been worsening. Patient has been pushing her self slower in the wheel chair. We think it is hurting her , patient is non-verbal.  Hip pain is on the left, this is chronic. Of note patient had not been eating in the days prior.     Review of Systems ROS negative except for those noted in the HPI above. Vitals:    01/26/23 1144   BP: 128/74   Pulse: (!) 102   Temp: 97.3 °F (36.3 °C)   SpO2: 98%     Physical Exam  Constitutional:       Appearance: Normal appearance. She is obese. HENT:      Head: Normocephalic and atraumatic. Eyes:      Extraocular Movements: Extraocular movements intact. Conjunctiva/sclera: Conjunctivae normal.   Cardiovascular:      Rate and Rhythm: Normal rate and regular rhythm. Heart sounds: No murmur heard. No friction rub. No gallop. Pulmonary:      Effort: Pulmonary effort is normal. No respiratory distress. Breath sounds: Normal breath sounds. No wheezing or rales. Musculoskeletal:         General: Swelling (right leg swelling above and below the knee) present. Skin:     Findings: No erythema or rash. Comments: Right leg much more warm to touch then left. Neurological:      Mental Status: Mental status is at baseline. Psychiatric:      Comments: Patient is currently at her baseline         An electronic signature was used to authenticate this note.     --Dejan Lopez, DO

## 2023-01-27 ENCOUNTER — TELEPHONE (OUTPATIENT)
Dept: INTERNAL MEDICINE CLINIC | Age: 58
End: 2023-01-27

## 2023-01-27 NOTE — TELEPHONE ENCOUNTER
When I called Barbara/Supervisor to make 4 week follow up appointment for patient with Dr. Jesse Chambers was wanting to know if we have gotten the results yet from a Vascular US that was done on patient 1/26/2023? Please call Oleksandr Cota at number provided once we have these results. Thanks.

## 2023-01-30 DIAGNOSIS — M79.89 PAIN AND SWELLING OF RIGHT LOWER EXTREMITY: Primary | ICD-10-CM

## 2023-01-30 DIAGNOSIS — M79.604 PAIN AND SWELLING OF RIGHT LOWER EXTREMITY: Primary | ICD-10-CM

## 2023-01-30 NOTE — TELEPHONE ENCOUNTER
Please call and tell the there was no clot on the Ultrasound. I think we should image her leg. Due to difficulty with imaging I would like to start just with an xray. Anything else will be difficult to obtain I will place order. Please also have them reach out to ortho to schedule an appointment and discuss this swelling. Thank you.

## 2023-02-02 ENCOUNTER — HOSPITAL ENCOUNTER (OUTPATIENT)
Dept: GENERAL RADIOLOGY | Age: 58
Discharge: HOME | End: 2023-02-02

## 2023-02-02 DIAGNOSIS — M79.604 PAIN AND SWELLING OF RIGHT LOWER EXTREMITY: ICD-10-CM

## 2023-02-02 DIAGNOSIS — M79.89 PAIN AND SWELLING OF RIGHT LOWER EXTREMITY: ICD-10-CM

## 2023-02-20 DIAGNOSIS — I10 PRIMARY HYPERTENSION: ICD-10-CM

## 2023-02-20 DIAGNOSIS — M25.552 CHRONIC HIP PAIN, LEFT: ICD-10-CM

## 2023-02-20 DIAGNOSIS — J30.9 ALLERGIC RHINITIS, UNSPECIFIED SEASONALITY, UNSPECIFIED TRIGGER: ICD-10-CM

## 2023-02-20 DIAGNOSIS — G89.29 CHRONIC HIP PAIN, LEFT: ICD-10-CM

## 2023-02-20 RX ORDER — LORATADINE 10 MG/1
TABLET ORAL
Qty: 30 TABLET | Refills: 2 | Status: SHIPPED | OUTPATIENT
Start: 2023-02-20

## 2023-02-20 RX ORDER — PROPRANOLOL HYDROCHLORIDE 40 MG/1
TABLET ORAL
Qty: 90 TABLET | Refills: 2 | Status: SHIPPED | OUTPATIENT
Start: 2023-02-20

## 2023-02-20 RX ORDER — ACETAMINOPHEN 500 MG
TABLET ORAL
Qty: 90 TABLET | Refills: 2 | Status: SHIPPED | OUTPATIENT
Start: 2023-02-20

## 2023-02-23 ENCOUNTER — OFFICE VISIT (OUTPATIENT)
Dept: INTERNAL MEDICINE CLINIC | Age: 58
End: 2023-02-23

## 2023-02-23 VITALS
TEMPERATURE: 97.6 F | BODY MASS INDEX: 32.05 KG/M2 | SYSTOLIC BLOOD PRESSURE: 126 MMHG | HEART RATE: 84 BPM | DIASTOLIC BLOOD PRESSURE: 76 MMHG | WEIGHT: 223.4 LBS | OXYGEN SATURATION: 92 %

## 2023-02-23 DIAGNOSIS — M79.89 PAIN AND SWELLING OF LOWER EXTREMITY, LEFT: ICD-10-CM

## 2023-02-23 DIAGNOSIS — Z13.1 SCREENING FOR DIABETES MELLITUS (DM): ICD-10-CM

## 2023-02-23 DIAGNOSIS — Z12.11 COLON CANCER SCREENING: ICD-10-CM

## 2023-02-23 DIAGNOSIS — M79.89 PAIN AND SWELLING OF LOWER EXTREMITY, RIGHT: ICD-10-CM

## 2023-02-23 DIAGNOSIS — R30.0 DYSURIA: Primary | ICD-10-CM

## 2023-02-23 DIAGNOSIS — E78.2 MIXED HYPERLIPIDEMIA: ICD-10-CM

## 2023-02-23 DIAGNOSIS — M79.605 PAIN AND SWELLING OF LOWER EXTREMITY, LEFT: ICD-10-CM

## 2023-02-23 DIAGNOSIS — N18.30 STAGE 3 CHRONIC KIDNEY DISEASE, UNSPECIFIED WHETHER STAGE 3A OR 3B CKD (HCC): ICD-10-CM

## 2023-02-23 DIAGNOSIS — M79.604 PAIN AND SWELLING OF LOWER EXTREMITY, RIGHT: ICD-10-CM

## 2023-02-23 DIAGNOSIS — M79.89 RIGHT LEG SWELLING: ICD-10-CM

## 2023-02-23 DIAGNOSIS — R73.03 PRE-DIABETES: ICD-10-CM

## 2023-02-23 DIAGNOSIS — M79.604 RIGHT LEG PAIN: ICD-10-CM

## 2023-02-23 LAB
BACTERIA: ABNORMAL /HPF
BILIRUBIN URINE: NEGATIVE
BLOOD, URINE: NEGATIVE
CLARITY: ABNORMAL
COLOR: YELLOW
EPITHELIAL CELLS, UA: 4 /HPF (ref 0–5)
GLUCOSE URINE: NEGATIVE MG/DL
HYALINE CASTS: 2 /LPF (ref 0–8)
KETONES, URINE: NEGATIVE MG/DL
LEUKOCYTE ESTERASE, URINE: ABNORMAL
MICROSCOPIC EXAMINATION: YES
NITRITE, URINE: POSITIVE
PH UA: 7.5 (ref 5–8)
PROTEIN UA: ABNORMAL MG/DL
RBC UA: 2 /HPF (ref 0–4)
SPECIFIC GRAVITY UA: 1.02 (ref 1–1.03)
URINE REFLEX TO CULTURE: YES
URINE TYPE: ABNORMAL
UROBILINOGEN, URINE: 0.2 E.U./DL
WBC UA: 63 /HPF (ref 0–5)

## 2023-02-23 SDOH — ECONOMIC STABILITY: INCOME INSECURITY: HOW HARD IS IT FOR YOU TO PAY FOR THE VERY BASICS LIKE FOOD, HOUSING, MEDICAL CARE, AND HEATING?: NOT HARD AT ALL

## 2023-02-23 SDOH — ECONOMIC STABILITY: FOOD INSECURITY: WITHIN THE PAST 12 MONTHS, YOU WORRIED THAT YOUR FOOD WOULD RUN OUT BEFORE YOU GOT MONEY TO BUY MORE.: NEVER TRUE

## 2023-02-23 SDOH — ECONOMIC STABILITY: FOOD INSECURITY: WITHIN THE PAST 12 MONTHS, THE FOOD YOU BOUGHT JUST DIDN'T LAST AND YOU DIDN'T HAVE MONEY TO GET MORE.: NEVER TRUE

## 2023-02-23 NOTE — PROGRESS NOTES
Kirill Gallagher (:  1965) is a 59 y.o. female,Established patient, here for evaluation of the following chief complaint(s):  Follow-up (Strong urine smell, and digging )      ASSESSMENT/PLAN:  1. Dysuria  Seems to be having discomfort with urination. Will obtain urinalysis with reflex to culture. 3. Pain and swelling of lower extremity, right  Patient continues to have right lower extremity swelling. Unable to obtain x-ray in the hospital.  We will attempt to get x-ray at home like we have had to do in the past for this patient. On palpation she does push away my hand however she does this much more so on the left side where she has severe hip arthritis. Unclear how tender this area is despite swelling. Continue Tylenol for pain  -     XR FEMUR RIGHT (MIN 2 VIEWS); Future  4. Mixed hyperlipidemia  We will obtain lipid panel that was ordered at last visit however not completed  5. Colon cancer screening  -     POCT Fecal Immunochemical Test (FIT); Future    Return in about 4 weeks (around 3/23/2023) for avs.    SUBJECTIVE/OBJECTIVE:  HPI    Patient is a 58 yo fm with pmhx of hld, pre dm, obesity, ckd and arthritis who presents secondary to ongoing swelling of upper extremity and new discomfort with urination. History gathered from patient's aide since patient is nonverbal.  According to patient's aide she has been taking and complaining about the vaginal area when urinating and throughout the day. They are concerned that she might have a UTI again. These are similar symptoms the patient had in the past with UTI. Last UTI was about a year ago and was treated with Macrobid. Patient also continues to have right leg swelling. We were able to obtain an Doppler of the lower extremity that ruled out DVT. Were not able to obtain an x-ray at the hospital.    Review of Systems ROS negative except for those noted in the HPI above.        Vitals:    23 1133   BP: 126/76   Pulse: 84   Temp: 97.6 °F (36.4 °C)   SpO2: 92%         Physical Exam  Constitutional:       General: She is not in acute distress. Appearance: She is not ill-appearing. HENT:      Head: Normocephalic and atraumatic. Right Ear: External ear normal.      Left Ear: External ear normal.   Eyes:      Extraocular Movements: Extraocular movements intact. Conjunctiva/sclera: Conjunctivae normal.   Cardiovascular:      Rate and Rhythm: Normal rate and regular rhythm. Heart sounds: No murmur heard. No friction rub. No gallop. Pulmonary:      Effort: Pulmonary effort is normal. No respiratory distress. Breath sounds: Normal breath sounds. No wheezing, rhonchi or rales. Musculoskeletal:      Right lower leg: No edema. Left lower leg: No edema. Neurological:      General: No focal deficit present. Mental Status: Mental status is at baseline. Psychiatric:         Mood and Affect: Mood normal.         Behavior: Behavior normal.         An electronic signature was used to authenticate this note.     --Dwayne Gibbons DO

## 2023-02-24 RX ORDER — NITROFURANTOIN 25; 75 MG/1; MG/1
100 CAPSULE ORAL 2 TIMES DAILY
Qty: 10 CAPSULE | Refills: 0 | Status: SHIPPED | OUTPATIENT
Start: 2023-02-24 | End: 2023-03-01

## 2023-02-25 LAB
ORGANISM: ABNORMAL
ORGANISM: ABNORMAL
URINE CULTURE, ROUTINE: ABNORMAL
URINE CULTURE, ROUTINE: ABNORMAL

## 2023-03-09 ENCOUNTER — TELEPHONE (OUTPATIENT)
Dept: INTERNAL MEDICINE CLINIC | Age: 58
End: 2023-03-09

## 2023-03-09 NOTE — TELEPHONE ENCOUNTER
Parris Oliver with [facility] is calling to let us know she is going to fax over x-ray results for Right [employer]. Please call her at number provided if we do not receive via fax today to let her know.

## 2023-03-20 DIAGNOSIS — F42.2 MIXED OBSESSIONAL THOUGHTS AND ACTS: ICD-10-CM

## 2023-03-20 RX ORDER — CLOMIPRAMINE HYDROCHLORIDE 50 MG/1
CAPSULE ORAL
Qty: 62 CAPSULE | Refills: 11 | Status: SHIPPED | OUTPATIENT
Start: 2023-03-20

## 2023-03-20 RX ORDER — SERTRALINE HYDROCHLORIDE 100 MG/1
TABLET, FILM COATED ORAL
Qty: 31 TABLET | Refills: 11 | Status: SHIPPED | OUTPATIENT
Start: 2023-03-20

## 2023-03-20 NOTE — TELEPHONE ENCOUNTER
Medication:   Requested Prescriptions     Pending Prescriptions Disp Refills    sertraline (ZOLOFT) 100 MG tablet [Pharmacy Med Name: SERTRALINE  MG TABLET] 31 tablet 11     Sig: TAKE 1 TABLET BY MOUTH ONCE A DAY FOR OCD    clomiPRAMINE (ANAFRANIL) 50 MG capsule [Pharmacy Med Name: CLOMIPRAMINE 50 MG CAP] 62 capsule 11     Sig: TAKE 2 CAPSULES BY MOUTH AT BEDTIME FOR OCD        Last Filled:11/28/22      Patient Phone Number: [phone] (home)     Last appt: 11/28/2022   Next appt: 3/29/2023    Last OARRS:   RX Monitoring 11/15/2016   Attestation The Prescription Monitoring Report for this patient was reviewed today. Periodic Controlled Substance Monitoring No signs of potential drug abuse or diversion identified.

## 2023-03-27 NOTE — PROGRESS NOTES
1/26/2023 6/4/2021   Little interest or pleasure in doing things 0 0   Feeling down, depressed, or hopeless 0 0   Trouble falling or staying asleep, or sleeping too much 0 -   Feeling tired or having little energy 0 -   Poor appetite or overeating 0 -   Trouble concentrating on things, such as reading the newspaper or watching television 0 -   Moving or speaking so slowly that other people could have noticed. Or the opposite - being so fidgety or restless that you have been moving around a lot more than usual 0 -   Thoughts that you would be better off dead, or of hurting yourself in some way 0 -   PHQ-9 Total Score 0 0   If you checked off any problems, how difficult have these problems made it for you to do your work, take care of things at home, or get along with other people? 0 -   No flowsheet data found. AIMS Scale  Facial and Oral Movements(AIMS)  Muscles of Facial Expression: None, normal  Lips and Perioral Area: None, normal  Jaw: None, normal  Tongue: None, normal  Extremity Movements(AIMS)  Upper (arms, wrists, hands, fingers): None, normal  Lower (legs, knees, ankles, toes): None, normal  Trunk Movements(AIMS)  Neck, shoulders, hips: None, normal  Overall Severity(AIMS)  Severity of abnormal movements (highest score from 1-7): 0  Incapacitation due to abnormal movements: None, normal  Patient's awareness of abnormal movements (rate only patient's report):  No Awareness  Total of items 1-7: 0  Copy above value to this cell: 0     Labs:     Office Visit on 02/23/2023   Component Date Value Ref Range Status    Color, UA 02/23/2023 Yellow  Straw/Yellow Final    Clarity, UA 02/23/2023 CLOUDY (A)  Clear Final    Glucose, Ur 02/23/2023 Negative  Negative mg/dL Final    Bilirubin Urine 02/23/2023 Negative  Negative Final    Ketones, Urine 02/23/2023 Negative  Negative mg/dL Final    Specific Gravity, UA 02/23/2023 1.019  1.005 - 1.030 Final    Blood, Urine 02/23/2023 Negative  Negative Final    pH, UA

## 2023-03-29 ENCOUNTER — OFFICE VISIT (OUTPATIENT)
Dept: PSYCHIATRY | Age: 58
End: 2023-03-29
Payer: MEDICARE

## 2023-03-29 VITALS
HEART RATE: 87 BPM | BODY MASS INDEX: 33.29 KG/M2 | OXYGEN SATURATION: 97 % | WEIGHT: 232 LBS | DIASTOLIC BLOOD PRESSURE: 64 MMHG | SYSTOLIC BLOOD PRESSURE: 108 MMHG

## 2023-03-29 DIAGNOSIS — F42.2 MIXED OBSESSIONAL THOUGHTS AND ACTS: Primary | ICD-10-CM

## 2023-03-29 PROCEDURE — G8417 CALC BMI ABV UP PARAM F/U: HCPCS | Performed by: STUDENT IN AN ORGANIZED HEALTH CARE EDUCATION/TRAINING PROGRAM

## 2023-03-29 PROCEDURE — G8484 FLU IMMUNIZE NO ADMIN: HCPCS | Performed by: STUDENT IN AN ORGANIZED HEALTH CARE EDUCATION/TRAINING PROGRAM

## 2023-03-29 PROCEDURE — 3017F COLORECTAL CA SCREEN DOC REV: CPT | Performed by: STUDENT IN AN ORGANIZED HEALTH CARE EDUCATION/TRAINING PROGRAM

## 2023-03-29 PROCEDURE — G8428 CUR MEDS NOT DOCUMENT: HCPCS | Performed by: STUDENT IN AN ORGANIZED HEALTH CARE EDUCATION/TRAINING PROGRAM

## 2023-03-29 PROCEDURE — 99214 OFFICE O/P EST MOD 30 MIN: CPT | Performed by: STUDENT IN AN ORGANIZED HEALTH CARE EDUCATION/TRAINING PROGRAM

## 2023-03-29 PROCEDURE — 1036F TOBACCO NON-USER: CPT | Performed by: STUDENT IN AN ORGANIZED HEALTH CARE EDUCATION/TRAINING PROGRAM

## 2023-03-29 RX ORDER — PERPHENAZINE 2 MG/1
TABLET ORAL
Qty: 31 TABLET | Refills: 3 | Status: SHIPPED | OUTPATIENT
Start: 2023-03-29

## 2023-04-04 ASSESSMENT — ENCOUNTER SYMPTOMS
GASTROINTESTINAL NEGATIVE: 1
EYES NEGATIVE: 1
RESPIRATORY NEGATIVE: 1
ALLERGIC/IMMUNOLOGIC NEGATIVE: 1

## 2023-04-08 NOTE — TELEPHONE ENCOUNTER
Called in to see if we can order some medication for a rash between legs she has had this for a couple days now, its where she gets wet between her depends and skin     Please call to advise
First, try to keep the area as dry as possible.   Lotrisone cream    15 g       apply twice daily
Lm nelson kilgore and gave advise
Patient Needs Assistance to Leave Residence...

## 2023-04-13 ENCOUNTER — TELEPHONE (OUTPATIENT)
Dept: INTERNAL MEDICINE CLINIC | Age: 58
End: 2023-04-13

## 2023-04-18 DIAGNOSIS — M79.89 SWELLING OF LOWER EXTREMITY: Primary | ICD-10-CM

## 2023-05-15 ENCOUNTER — TELEPHONE (OUTPATIENT)
Dept: INTERNAL MEDICINE CLINIC | Age: 58
End: 2023-05-15

## 2023-05-15 NOTE — TELEPHONE ENCOUNTER
Karen from [facility] is calling she received the fax back about the Compression Garments but she still has not received the office chart notes. [facility] stated that she can not fill the prescription with out the chart notes.      Please fax to [facility]   F# [phone]

## 2023-05-17 DIAGNOSIS — M25.552 CHRONIC HIP PAIN, LEFT: ICD-10-CM

## 2023-05-17 DIAGNOSIS — G89.29 CHRONIC HIP PAIN, LEFT: ICD-10-CM

## 2023-05-17 DIAGNOSIS — J30.9 ALLERGIC RHINITIS, UNSPECIFIED SEASONALITY, UNSPECIFIED TRIGGER: ICD-10-CM

## 2023-05-17 DIAGNOSIS — I10 PRIMARY HYPERTENSION: ICD-10-CM

## 2023-05-18 RX ORDER — ACETAMINOPHEN 500 MG
TABLET ORAL
Qty: 93 TABLET | Refills: 11 | Status: SHIPPED | OUTPATIENT
Start: 2023-05-18

## 2023-05-18 RX ORDER — LORATADINE 10 MG/1
TABLET ORAL
Qty: 31 TABLET | Refills: 2 | Status: SHIPPED | OUTPATIENT
Start: 2023-05-18

## 2023-05-18 RX ORDER — PROPRANOLOL HYDROCHLORIDE 40 MG/1
TABLET ORAL
Qty: 93 TABLET | Refills: 11 | Status: SHIPPED | OUTPATIENT
Start: 2023-05-18

## 2023-05-18 NOTE — TELEPHONE ENCOUNTER
Received fax from [facility]. Previous note did not include need for compression stocking.  Faxed over addended note

## 2023-05-18 NOTE — TELEPHONE ENCOUNTER
Last appointment: 2/23/2023  Next appointment: 6/12/2023  Last refill: 2/2/2/23  Propanolol and Acetaminophen should both still have a 90 supply remaining.

## 2023-06-14 ENCOUNTER — OFFICE VISIT (OUTPATIENT)
Dept: INTERNAL MEDICINE CLINIC | Age: 58
End: 2023-06-14
Payer: MEDICARE

## 2023-06-14 VITALS
RESPIRATION RATE: 14 BRPM | HEART RATE: 85 BPM | SYSTOLIC BLOOD PRESSURE: 118 MMHG | WEIGHT: 241 LBS | DIASTOLIC BLOOD PRESSURE: 72 MMHG | HEIGHT: 70 IN | BODY MASS INDEX: 34.5 KG/M2 | OXYGEN SATURATION: 98 %

## 2023-06-14 DIAGNOSIS — R82.90 BAD ODOR OF URINE: Primary | ICD-10-CM

## 2023-06-14 PROCEDURE — 3017F COLORECTAL CA SCREEN DOC REV: CPT | Performed by: INTERNAL MEDICINE

## 2023-06-14 PROCEDURE — G8417 CALC BMI ABV UP PARAM F/U: HCPCS | Performed by: INTERNAL MEDICINE

## 2023-06-14 PROCEDURE — 1036F TOBACCO NON-USER: CPT | Performed by: INTERNAL MEDICINE

## 2023-06-14 PROCEDURE — G8427 DOCREV CUR MEDS BY ELIG CLIN: HCPCS | Performed by: INTERNAL MEDICINE

## 2023-06-14 PROCEDURE — 99213 OFFICE O/P EST LOW 20 MIN: CPT | Performed by: INTERNAL MEDICINE

## 2023-06-19 DIAGNOSIS — F42.2 MIXED OBSESSIONAL THOUGHTS AND ACTS: ICD-10-CM

## 2023-07-10 ENCOUNTER — OFFICE VISIT (OUTPATIENT)
Dept: INTERNAL MEDICINE CLINIC | Age: 58
End: 2023-07-10

## 2023-07-10 VITALS
BODY MASS INDEX: 31.64 KG/M2 | OXYGEN SATURATION: 96 % | SYSTOLIC BLOOD PRESSURE: 110 MMHG | HEART RATE: 99 BPM | HEIGHT: 70 IN | WEIGHT: 221 LBS | DIASTOLIC BLOOD PRESSURE: 60 MMHG | RESPIRATION RATE: 14 BRPM
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
## 2023-07-10 NOTE — PROGRESS NOTES
Gay Shaw (:  1965) is a 59 y.o. female,Established patient, here for evaluation of the following chief complaint(s):  Fall (2 weeks ago, fell on left)        ASSESSMENT/PLAN:  1. Fall, initial encounter  Patient had fall at home onto left hip. No tenderness on exam. Will hold off on any imaging. Can give tylenol for any pain that they notice. Keep already scheduled follow up    SUBJECTIVE/OBJECTIVE:  HPI    Patient is a 60 yo fm with pmhx that is complicated including ckd and depression who presents 2/2 fall at home. Patient had a fall at home on July first. She was trying to get in her recliner. The foot rest was up and she fell. Staff tried to help her back in the chair but she was scuttling on her bottom. A little bit of wining. Laurel Trevon on the right side. Fell onto the hip area. Have not noticed babying it more. Unfortunately patient non-verbal.     Hx gathered by patients nurse aid. Review of Systems unable to obtain. Vitals:    07/10/23 1358   BP: 110/60   Pulse: 99   Resp: 14   SpO2: 96%     Physical Exam  Constitutional:       General: She is not in acute distress. Appearance: Normal appearance. She is not ill-appearing. HENT:      Head: Normocephalic and atraumatic. Right Ear: External ear normal.      Left Ear: External ear normal.   Eyes:      Extraocular Movements: Extraocular movements intact. Conjunctiva/sclera: Conjunctivae normal.   Cardiovascular:      Rate and Rhythm: Normal rate and regular rhythm. Heart sounds: No murmur heard. No friction rub. No gallop. Pulmonary:      Effort: Pulmonary effort is normal. No respiratory distress. Breath sounds: Normal breath sounds. No wheezing, rhonchi or rales. Musculoskeletal:      Comments: No tenderness to palpation of the left hip   Neurological:      Mental Status: She is alert. Mental status is at baseline. An electronic signature was used to authenticate this note.     --Casandra WHATLEY

## 2023-07-17 DIAGNOSIS — F42.2 MIXED OBSESSIONAL THOUGHTS AND ACTS: ICD-10-CM

## 2023-07-17 RX ORDER — PERPHENAZINE 2 MG/1
TABLET ORAL
Qty: 31 TABLET | Refills: 3 | Status: SHIPPED | OUTPATIENT
Start: 2023-07-17

## 2023-07-21 LAB
BACTERIA URNS QL MICRO: ABNORMAL /HPF
BILIRUB UR QL STRIP.AUTO: NEGATIVE
CLARITY UR: ABNORMAL
COLOR UR: YELLOW
EPI CELLS #/AREA URNS AUTO: 2 /HPF (ref 0–5)
GLUCOSE UR STRIP.AUTO-MCNC: NEGATIVE MG/DL
HGB UR QL STRIP.AUTO: NEGATIVE
HYALINE CASTS #/AREA URNS AUTO: 0 /LPF (ref 0–8)
KETONES UR STRIP.AUTO-MCNC: NEGATIVE MG/DL
LEUKOCYTE ESTERASE UR QL STRIP.AUTO: ABNORMAL
NITRITE UR QL STRIP.AUTO: POSITIVE
PH UR STRIP.AUTO: 7 [PH] (ref 5–8)
PROT UR STRIP.AUTO-MCNC: NEGATIVE MG/DL
RBC CLUMPS #/AREA URNS AUTO: 0 /HPF (ref 0–4)
SP GR UR STRIP.AUTO: 1.02 (ref 1–1.03)
UA DIPSTICK W REFLEX MICRO PNL UR: YES
URN SPEC COLLECT METH UR: ABNORMAL
UROBILINOGEN UR STRIP-ACNC: 0.2 E.U./DL
WBC #/AREA URNS AUTO: 11 /HPF (ref 0–5)

## 2023-07-23 LAB
BACTERIA UR CULT: ABNORMAL
BACTERIA UR CULT: ABNORMAL
ORGANISM: ABNORMAL

## 2023-07-24 RX ORDER — NITROFURANTOIN 25; 75 MG/1; MG/1
100 CAPSULE ORAL 2 TIMES DAILY
Qty: 10 CAPSULE | Refills: 0 | Status: SHIPPED | OUTPATIENT
Start: 2023-07-24 | End: 2023-07-29

## 2023-07-27 ENCOUNTER — OFFICE VISIT (OUTPATIENT)
Dept: PSYCHIATRY | Age: 58
End: 2023-07-27

## 2023-07-27 VITALS
WEIGHT: 228 LBS | SYSTOLIC BLOOD PRESSURE: 124 MMHG | BODY MASS INDEX: 32.64 KG/M2 | HEIGHT: 70 IN | HEART RATE: 82 BPM | DIASTOLIC BLOOD PRESSURE: 88 MMHG

## 2023-07-27 DIAGNOSIS — F42.2 MIXED OBSESSIONAL THOUGHTS AND ACTS: ICD-10-CM

## 2023-07-27 DIAGNOSIS — F84.0 AUTISM: Primary | ICD-10-CM

## 2023-07-27 DIAGNOSIS — F33.1 MODERATE EPISODE OF RECURRENT MAJOR DEPRESSIVE DISORDER (HCC): ICD-10-CM

## 2023-07-27 RX ORDER — SERTRALINE HYDROCHLORIDE 100 MG/1
TABLET, FILM COATED ORAL
Qty: 31 TABLET | Refills: 2 | Status: SHIPPED | OUTPATIENT
Start: 2023-07-27

## 2023-07-27 ASSESSMENT — PATIENT HEALTH QUESTIONNAIRE - PHQ9
5. POOR APPETITE OR OVEREATING: 0
SUM OF ALL RESPONSES TO PHQ9 QUESTIONS 1 & 2: 0
2. FEELING DOWN, DEPRESSED OR HOPELESS: 0
4. FEELING TIRED OR HAVING LITTLE ENERGY: 1
SUM OF ALL RESPONSES TO PHQ QUESTIONS 1-9: 3
8. MOVING OR SPEAKING SO SLOWLY THAT OTHER PEOPLE COULD HAVE NOTICED. OR THE OPPOSITE, BEING SO FIGETY OR RESTLESS THAT YOU HAVE BEEN MOVING AROUND A LOT MORE THAN USUAL: 0
9. THOUGHTS THAT YOU WOULD BE BETTER OFF DEAD, OR OF HURTING YOURSELF: 0
1. LITTLE INTEREST OR PLEASURE IN DOING THINGS: 0
SUM OF ALL RESPONSES TO PHQ QUESTIONS 1-9: 3
7. TROUBLE CONCENTRATING ON THINGS, SUCH AS READING THE NEWSPAPER OR WATCHING TELEVISION: 0
SUM OF ALL RESPONSES TO PHQ QUESTIONS 1-9: 3
SUM OF ALL RESPONSES TO PHQ QUESTIONS 1-9: 3
6. FEELING BAD ABOUT YOURSELF - OR THAT YOU ARE A FAILURE OR HAVE LET YOURSELF OR YOUR FAMILY DOWN: 0
3. TROUBLE FALLING OR STAYING ASLEEP: 2

## 2023-07-27 ASSESSMENT — ENCOUNTER SYMPTOMS
RESPIRATORY NEGATIVE: 1
EYES NEGATIVE: 1
ALLERGIC/IMMUNOLOGIC NEGATIVE: 1
GASTROINTESTINAL NEGATIVE: 1

## 2023-07-27 ASSESSMENT — ANXIETY QUESTIONNAIRES
7. FEELING AFRAID AS IF SOMETHING AWFUL MIGHT HAPPEN: 0
6. BECOMING EASILY ANNOYED OR IRRITABLE: 0
5. BEING SO RESTLESS THAT IT IS HARD TO SIT STILL: 1
4. TROUBLE RELAXING: 0
3. WORRYING TOO MUCH ABOUT DIFFERENT THINGS: 0
GAD7 TOTAL SCORE: 1
1. FEELING NERVOUS, ANXIOUS, OR ON EDGE: 0
2. NOT BEING ABLE TO STOP OR CONTROL WORRYING: 0

## 2023-07-27 NOTE — PROGRESS NOTES
PSYCHIATRY PROGRESS NOTE    Quintin Newsome  1965 07/27/2023  Face to Face time: 20 minutes  PCP: Rachele Robert DO    CC:   Chief Complaint   Patient presents with    Follow-up       Patient is a 59 y.o. female with a past medical history significant of autism, prediabetes, mixed hyperlipidemia who presents to the outpatient psychiatric clinic today for evaluation and management of OCD and autistic behaviors. A:  Patient's presentation today is indicative of some mild improvement in her OCD symptoms with the most recent medication change. We will continue to provide ongoing support. Diagnosis:  Autism, level 3  OCD    P:   No changes to current regimen. Patient to continue on perphenazine 2mg nightly, sertraline 150mg daily, and clomipramine 100mg qhs. Medication Monitoring:    - PDMP reviewed: No interval change     Follow-up: 3 months    Safety: Pt was counseled on the potential for increased suicidal ideations and advised on potential options for dealing with these including hotlines, calling the office, or going to the nearest emergency room. __________________________________________________________________________    S:   Patient remains nonverbal.    The patient was accompanied by caregiver today. Caregiver indicated that Gilford Mutter appears to be doing well on her current regimen. There is a good deal of singing, which they take to mean that Gilford Mutter is happy. Gilford Mutter will be going to the pool like she always does on Thursdays, and they believe she really enjoys that. Staff continue to work with her on her picking behaviors, though at this point they just tend to work around them (I.e. get a new couch when the old one has been severely picked at). Interestingly, in office Jenny didn't reach for a pen that was sat at the edge of the computer desk today. She saw a string on her hat, played with it a couple seconds, and then put her hat on again.     The caregiver indicated no

## 2023-08-09 ENCOUNTER — HOSPITAL ENCOUNTER (EMERGENCY)
Age: 58
Discharge: HOME | End: 2023-08-10
Attending: EMERGENCY MEDICINE
Payer: MEDICARE

## 2023-08-09 DIAGNOSIS — T50.901A ACCIDENTAL DRUG INGESTION, INITIAL ENCOUNTER: Primary | ICD-10-CM

## 2023-08-09 LAB
GLUCOSE BLD-MCNC: 110 MG/DL (ref 70–99)
PERFORMED ON: ABNORMAL

## 2023-08-09 PROCEDURE — 93005 ELECTROCARDIOGRAM TRACING: CPT

## 2023-08-09 PROCEDURE — 99283 EMERGENCY DEPT VISIT LOW MDM: CPT

## 2023-08-09 ASSESSMENT — PAIN SCALES - GENERAL: PAINLEVEL_OUTOF10: 0

## 2023-08-09 ASSESSMENT — PAIN - FUNCTIONAL ASSESSMENT: PAIN_FUNCTIONAL_ASSESSMENT: 0-10

## 2023-08-10 ENCOUNTER — CLINICAL DOCUMENTATION ONLY (OUTPATIENT)
Facility: CLINIC | Age: 58
End: 2023-08-10

## 2023-08-10 VITALS
RESPIRATION RATE: 16 BRPM | HEIGHT: 67 IN | OXYGEN SATURATION: 98 % | BODY MASS INDEX: 35.71 KG/M2 | TEMPERATURE: 97.8 F | SYSTOLIC BLOOD PRESSURE: 111 MMHG | DIASTOLIC BLOOD PRESSURE: 64 MMHG | HEART RATE: 83 BPM

## 2023-08-10 LAB
EKG ATRIAL RATE: 79 BPM
EKG DIAGNOSIS: NORMAL
EKG P AXIS: 52 DEGREES
EKG P-R INTERVAL: 144 MS
EKG Q-T INTERVAL: 408 MS
EKG QRS DURATION: 82 MS
EKG QTC CALCULATION (BAZETT): 467 MS
EKG R AXIS: 48 DEGREES
EKG T AXIS: 39 DEGREES
EKG VENTRICULAR RATE: 79 BPM

## 2023-08-10 ASSESSMENT — PAIN - FUNCTIONAL ASSESSMENT: PAIN_FUNCTIONAL_ASSESSMENT: NONE - DENIES PAIN

## 2023-08-10 NOTE — ED NOTES
Pt discharged to home. Discharge instructions reviewed with patient caregiver. All questions answered, no concerns noted. Pt encouraged to return to emergency department if they have any new or worsening symptoms. Pt alert and oriented to baseline, resp even and unlabored, skin natural in color, no signs of acute distress.         Jairo Green RN  08/10/23 [id number]

## 2023-08-10 NOTE — ED NOTES
Patient took her roommates evening meds. Ferrous Sulfate 325 mg, Lisinopril 5 mg, Quetiapinel 300 mg, Primidone 250mg.        Adalberto Dugan RN  08/09/23 [id number]

## 2023-08-10 NOTE — ED PROVIDER NOTES
ED Attending Attestation Note     Date of evaluation: 8/9/2023    This patient was seen by the resident. I have seen and examined the patient, agree with the workup, evaluation, management and diagnosis. The care plan has been discussed. I have reviewed the ECG and concur with the resident's interpretation. My assessment reveals accidental ingestion. Patient is currently at a group home and was accidentally given her roommate's medications in addition to her medications. These medications include Seroquel, primidone, and lisinopril. On arrival, patient is hemodynamically stable and at her mental status baseline per staff. Will observe in the emergency department.      Elva Lozada MD  08/10/23 [id number]

## 2023-08-10 NOTE — DISCHARGE INSTRUCTIONS
Arie Hayes was seen in the ER after an accidental ingestion of medications that were not prescribed to her. She did require monitoring for 6 hours post this ingestion. We also got an EKG and a blood sugar level to make sure these medications did not cause any problems there and these were normal. She remained stable and deemed safe for discharge back home.     You can continue her medications starting in the morning as prescribed    Return to ER if new or worsening symptoms

## 2023-08-11 ENCOUNTER — CARE COORDINATION (OUTPATIENT)
Dept: CARE COORDINATION | Age: 58
End: 2023-08-11

## 2023-08-11 NOTE — CARE COORDINATION
Ambulatory Care Coordination  ED Follow up Call  Due to pt cognitive status, reviewed w/Barbara Petersen Program CM & Kristy Peterson Program CM Supervisor (each listed on HIPAA) for purpose of this [facility] outreach- both advise pt incapable of engaging for [facility] OR review of ED DC instructions. Reason for ED visit:  accidental drug ingestion   Status:     significantly improved    Did you call your PCP prior to going to the ED? No      Did you receive a discharge instructions from the Emergency Room? Yes  Review of Instructions:     Understands what to report/when to return?:  Yes   Understands discharge instructions?:  Yes   Following discharge instructions?:  Yes   If not why? Are there any new complaints of pain? No  New Pain Meds? N/A    Constipation prophylaxis needed? No    If you have a wound is the dressing clean, dry, and intact? N/A  Understands wound care regimen? N/A    Are there any other complaints/concerns that you wish to tell your provider? No    FU appts/Provider:    Future Appointments   Date Time Provider [address]   8/14/2023  9:30 AM DO JOHN Keating   12/13/2023  1:30 PM DO JOHN Keating     New Medications?:   No      Medication Reconciliation by phone - Yes  Understands Medications? Yes  Taking Medications? Yes    Any further needs in the home i.e. Equipment? No    Link to services in community?:  Yes   Which services:   24/7  Patient Excluded from Care Coordination?  Yes     The patient will be excluded from Care Coordination for the following reason: Patient resides in LTC/Group Home

## 2023-08-14 ENCOUNTER — OFFICE VISIT (OUTPATIENT)
Dept: INTERNAL MEDICINE CLINIC | Age: 58
End: 2023-08-14

## 2023-08-14 VITALS
HEART RATE: 86 BPM | SYSTOLIC BLOOD PRESSURE: 112 MMHG | BODY MASS INDEX: 35.94 KG/M2 | HEIGHT: 67 IN | OXYGEN SATURATION: 97 % | DIASTOLIC BLOOD PRESSURE: 66 MMHG | WEIGHT: 229 LBS

## 2023-08-14 DIAGNOSIS — G89.29 CHRONIC HIP PAIN, LEFT: ICD-10-CM

## 2023-08-14 DIAGNOSIS — T50.901D ACCIDENTAL OVERDOSE, SUBSEQUENT ENCOUNTER: Primary | ICD-10-CM

## 2023-08-14 DIAGNOSIS — M25.552 CHRONIC HIP PAIN, LEFT: ICD-10-CM

## 2023-08-14 NOTE — PROGRESS NOTES
Jerry Turpin (:  1965) is a 59 y.o. female,Established patient, here for evaluation of the following chief complaint(s):  Follow-up (From er,  Accidental drug ingestion/) and Insomnia (Sister would like to discuss she is unable to sleep at night )      ASSESSMENT/PLAN:  1. Accidental overdose, subsequent encounter  Patient back at baseline now. Did go to ER and received fluids. Group home has gone over procedure to help prevent in future. 2. Chronic hip pain, left  At baseline continue tylenol and lidocaine patches. Discussed allergy to tramadol limits other treatments. Return if symptoms worsen or fail to improve. SUBJECTIVE/OBJECTIVE:  HPI    Patient is a 60 yo fm with pmhx CKD, depression, hld, pre-dm  who presents after ER visit for overdose. Patient was given another home care patients medications. This caused vomiting. Patient was taken to the ER. She was able to be discharged. Patient non-verbal and hx was gather by her sister. Patient has been at baseline. She is otherwise doig well when it comes to her hip. She notes still using lidocine patches . These seem to be working well for the patient. Review of Systems unable to obtain. Vitals:    23 0933   BP: 112/66   Pulse: 86   SpO2: 97%         Physical Exam  Constitutional:       General: She is not in acute distress. Appearance: Normal appearance. She is not ill-appearing. HENT:      Head: Normocephalic and atraumatic. Right Ear: External ear normal.      Left Ear: External ear normal.   Eyes:      Extraocular Movements: Extraocular movements intact. Conjunctiva/sclera: Conjunctivae normal.   Cardiovascular:      Rate and Rhythm: Normal rate and regular rhythm. Heart sounds: No murmur heard. No friction rub. No gallop. Pulmonary:      Effort: Pulmonary effort is normal. No respiratory distress. Breath sounds: Normal breath sounds. No wheezing, rhonchi or rales.    Skin:

## 2023-08-15 DIAGNOSIS — M25.552 CHRONIC HIP PAIN, LEFT: ICD-10-CM

## 2023-08-15 DIAGNOSIS — G89.29 CHRONIC HIP PAIN, LEFT: ICD-10-CM

## 2023-08-15 DIAGNOSIS — J30.9 ALLERGIC RHINITIS, UNSPECIFIED SEASONALITY, UNSPECIFIED TRIGGER: ICD-10-CM

## 2023-08-15 RX ORDER — LIDOCAINE PAIN RELIEF 40 MG/1000MG
PATCH TOPICAL
Qty: 30 PATCH | Refills: 1 | Status: SHIPPED | OUTPATIENT
Start: 2023-08-15 | End: 2023-10-10

## 2023-08-15 RX ORDER — LORATADINE 10 MG/1
TABLET ORAL
Qty: 30 TABLET | Refills: 1 | Status: SHIPPED | OUTPATIENT
Start: 2023-08-15 | End: 2023-10-10

## 2023-08-18 ENCOUNTER — PATIENT MESSAGE (OUTPATIENT)
Dept: INTERNAL MEDICINE CLINIC | Age: 58
End: 2023-08-18

## 2023-08-18 NOTE — TELEPHONE ENCOUNTER
From: Fariba Knapp  To: Dr. Savannah Patel: 8/18/2023 10:41 AM EDT  Subject: Judd Velez    Pharmacy will send us a tub of aquaphor as it is an OTC. With an order, it may be covered by her insurance as a preventative, That phrase should be on the order so we have it on the [facility]

## 2023-08-25 ENCOUNTER — TELEPHONE (OUTPATIENT)
Dept: INTERNAL MEDICINE CLINIC | Age: 58
End: 2023-08-25

## 2023-08-25 NOTE — TELEPHONE ENCOUNTER
Martina Enter Manager, with concern for patient for UTI. Due to transportation and staffing at the Group home she is requesting an appointment for next week. She was offered an appointment on Wednesday but the Thursday appointment was more convenient. URINARY SYMPTOMS TRIAGE    When did the symptoms start? 1 day(s) ago  Pain during urination? no  Urinary frequency or urgency? no  Difficulty passing urine? no   Change in appearance or smell of urine?  yes - Urine has a strong odor and is cloudy  Fevers? none  Back pain? no  Abdominal pain? no  GI symptoms? no    Other symptoms? none  Treatment tried so far? none

## 2023-08-31 ENCOUNTER — OFFICE VISIT (OUTPATIENT)
Dept: INTERNAL MEDICINE CLINIC | Age: 58
End: 2023-08-31
Payer: MEDICARE

## 2023-08-31 VITALS
SYSTOLIC BLOOD PRESSURE: 96 MMHG | OXYGEN SATURATION: 98 % | WEIGHT: 226 LBS | DIASTOLIC BLOOD PRESSURE: 56 MMHG | BODY MASS INDEX: 35.4 KG/M2 | HEART RATE: 65 BPM

## 2023-08-31 DIAGNOSIS — R30.0 DYSURIA: Primary | ICD-10-CM

## 2023-08-31 LAB
BILIRUBIN, POC: NEGATIVE
BLOOD URINE, POC: NEGATIVE
CLARITY, POC: NORMAL
COLOR, POC: YELLOW
GLUCOSE URINE, POC: NEGATIVE
KETONES, POC: NEGATIVE
LEUKOCYTE EST, POC: NORMAL
NITRITE, POC: POSITIVE
PH, POC: 5
PROTEIN, POC: NEGATIVE
SPECIFIC GRAVITY, POC: 1.02
UROBILINOGEN, POC: NORMAL

## 2023-08-31 PROCEDURE — 1036F TOBACCO NON-USER: CPT | Performed by: INTERNAL MEDICINE

## 2023-08-31 PROCEDURE — 3017F COLORECTAL CA SCREEN DOC REV: CPT | Performed by: INTERNAL MEDICINE

## 2023-08-31 PROCEDURE — 81002 URINALYSIS NONAUTO W/O SCOPE: CPT | Performed by: INTERNAL MEDICINE

## 2023-08-31 PROCEDURE — 81001 URINALYSIS AUTO W/SCOPE: CPT | Performed by: INTERNAL MEDICINE

## 2023-08-31 PROCEDURE — 99213 OFFICE O/P EST LOW 20 MIN: CPT | Performed by: INTERNAL MEDICINE

## 2023-08-31 PROCEDURE — G8417 CALC BMI ABV UP PARAM F/U: HCPCS | Performed by: INTERNAL MEDICINE

## 2023-08-31 PROCEDURE — G8428 CUR MEDS NOT DOCUMENT: HCPCS | Performed by: INTERNAL MEDICINE

## 2023-08-31 RX ORDER — NITROFURANTOIN 25; 75 MG/1; MG/1
100 CAPSULE ORAL 2 TIMES DAILY
Qty: 10 CAPSULE | Refills: 0 | Status: SHIPPED | OUTPATIENT
Start: 2023-08-31 | End: 2023-09-05

## 2023-08-31 NOTE — PROGRESS NOTES
Tyler Oquendo (:  1965) is a 59 y.o. female,Established patient, here for evaluation of the following chief complaint(s):  Urinary Tract Infection (Cloudy urine with strong odor)    ASSESSMENT/PLAN:  1. Dysuria  Urine dip concerning for UTI. Will start macrobid. Send urine for formal urinalysis and urine culture. Nursing to call with worsening or no improvement in symptoms.   -     Urinalysis with Microscopic; Future  -     POCT Urinalysis no Micro  -     Culture, Urine; Future      Return if symptoms worsen or fail to improve. SUBJECTIVE/OBJECTIVE:  HPI    Patient is a 58 yo fm with pmhx autism, hld, pre-dm who presents with concerns for uti. Patient non verbal. Hx taken from patients nurses aide. Per nursing aide patients Urine with a strong smell starting a few days ago. Cloudy in color. She uses the rest room a lot in general unsure if she is going more than normal. Pad has been over saturated in the mornings. She notes wiggling when urinating which she has done before with uti. Review of Systems patient non verbal    Vitals:    23 0955   BP: (!) 96/56   Pulse: 65   SpO2: 98%         Physical Exam  Constitutional:       General: She is not in acute distress. Appearance: Normal appearance. She is not ill-appearing. HENT:      Head: Normocephalic and atraumatic. Right Ear: External ear normal.      Left Ear: External ear normal.   Eyes:      Extraocular Movements: Extraocular movements intact. Conjunctiva/sclera: Conjunctivae normal.   Cardiovascular:      Rate and Rhythm: Normal rate and regular rhythm. Heart sounds: No murmur heard. No friction rub. No gallop. Pulmonary:      Effort: Pulmonary effort is normal. No respiratory distress. Breath sounds: Normal breath sounds. No wheezing, rhonchi or rales. Abdominal:      General: Bowel sounds are normal. There is no distension. Palpations: Abdomen is soft. Tenderness:  There is no

## 2023-09-01 LAB
BACTERIA URNS QL MICRO: ABNORMAL /HPF
BILIRUB UR QL STRIP.AUTO: NEGATIVE
CALCIUM OXALATE CRYSTALS: PRESENT
CLARITY UR: ABNORMAL
COLOR UR: YELLOW
EPI CELLS #/AREA URNS AUTO: 4 /HPF (ref 0–5)
GLUCOSE UR STRIP.AUTO-MCNC: NEGATIVE MG/DL
HGB UR QL STRIP.AUTO: NEGATIVE
HYALINE CASTS #/AREA URNS AUTO: 0 /LPF (ref 0–8)
KETONES UR STRIP.AUTO-MCNC: NEGATIVE MG/DL
LEUKOCYTE ESTERASE UR QL STRIP.AUTO: ABNORMAL
NITRITE UR QL STRIP.AUTO: POSITIVE
PH UR STRIP.AUTO: 6 [PH] (ref 5–8)
PROT UR STRIP.AUTO-MCNC: ABNORMAL MG/DL
RBC CLUMPS #/AREA URNS AUTO: 1 /HPF (ref 0–4)
SP GR UR STRIP.AUTO: 1.02 (ref 1–1.03)
UA DIPSTICK W REFLEX MICRO PNL UR: YES
URN SPEC COLLECT METH UR: ABNORMAL
UROBILINOGEN UR STRIP-ACNC: 0.2 E.U./DL
WBC #/AREA URNS AUTO: 18 /HPF (ref 0–5)

## 2023-09-03 LAB
BACTERIA UR CULT: ABNORMAL
ORGANISM: ABNORMAL

## 2023-10-10 DIAGNOSIS — M25.552 CHRONIC HIP PAIN, LEFT: ICD-10-CM

## 2023-10-10 DIAGNOSIS — J30.9 ALLERGIC RHINITIS, UNSPECIFIED SEASONALITY, UNSPECIFIED TRIGGER: ICD-10-CM

## 2023-10-10 DIAGNOSIS — F42.2 MIXED OBSESSIONAL THOUGHTS AND ACTS: ICD-10-CM

## 2023-10-10 DIAGNOSIS — G89.29 CHRONIC HIP PAIN, LEFT: ICD-10-CM

## 2023-10-10 RX ORDER — SERTRALINE HYDROCHLORIDE 100 MG/1
TABLET, FILM COATED ORAL
Qty: 31 TABLET | Refills: 2 | Status: SHIPPED | OUTPATIENT
Start: 2023-10-10

## 2023-10-10 RX ORDER — LORATADINE 10 MG/1
TABLET ORAL
Qty: 30 TABLET | Refills: 1 | Status: SHIPPED | OUTPATIENT
Start: 2023-10-10

## 2023-10-10 RX ORDER — LIDOCAINE PAIN RELIEF 40 MG/1000MG
PATCH TOPICAL
Qty: 30 EACH | Refills: 1 | Status: SHIPPED | OUTPATIENT
Start: 2023-10-10

## 2023-10-10 NOTE — TELEPHONE ENCOUNTER
Last appointment: 8/31/2023  Next appointment: Establishing with New PCP 12/13/23  Last refill: 8/15/23  Please advise as DOD.  Thank you

## 2023-11-09 DIAGNOSIS — F42.2 MIXED OBSESSIONAL THOUGHTS AND ACTS: ICD-10-CM

## 2023-11-10 RX ORDER — PERPHENAZINE 2 MG/1
TABLET ORAL
Qty: 31 TABLET | Refills: 1 | Status: SHIPPED | OUTPATIENT
Start: 2023-11-10 | End: 2024-01-11 | Stop reason: SDUPTHER

## 2023-12-13 ENCOUNTER — OFFICE VISIT (OUTPATIENT)
Dept: FAMILY MEDICINE CLINIC | Age: 58
End: 2023-12-13

## 2023-12-13 VITALS — SYSTOLIC BLOOD PRESSURE: 128 MMHG | BODY MASS INDEX: 37.43 KG/M2 | WEIGHT: 239 LBS | DIASTOLIC BLOOD PRESSURE: 78 MMHG

## 2023-12-13 DIAGNOSIS — J30.9 ALLERGIC RHINITIS, UNSPECIFIED SEASONALITY, UNSPECIFIED TRIGGER: ICD-10-CM

## 2023-12-13 DIAGNOSIS — M25.552 CHRONIC HIP PAIN, LEFT: ICD-10-CM

## 2023-12-13 DIAGNOSIS — Z00.00 ROUTINE GENERAL MEDICAL EXAMINATION AT A HEALTH CARE FACILITY: Primary | ICD-10-CM

## 2023-12-13 DIAGNOSIS — F33.1 MODERATE EPISODE OF RECURRENT MAJOR DEPRESSIVE DISORDER (HCC): ICD-10-CM

## 2023-12-13 DIAGNOSIS — G89.29 CHRONIC HIP PAIN, LEFT: ICD-10-CM

## 2023-12-13 DIAGNOSIS — G47.00 INSOMNIA, UNSPECIFIED TYPE: ICD-10-CM

## 2023-12-13 DIAGNOSIS — F42.2 MIXED OBSESSIONAL THOUGHTS AND ACTS: ICD-10-CM

## 2023-12-13 RX ORDER — LIDOCAINE PAIN RELIEF 40 MG/1000MG
PATCH TOPICAL
Qty: 30 PATCH | Refills: 11 | Status: SHIPPED | OUTPATIENT
Start: 2023-12-13

## 2023-12-13 RX ORDER — LORATADINE 10 MG/1
TABLET ORAL
Qty: 31 TABLET | Refills: 11 | Status: SHIPPED | OUTPATIENT
Start: 2023-12-13

## 2023-12-13 RX ORDER — HYDROXYZINE HYDROCHLORIDE 25 MG/1
25 TABLET, FILM COATED ORAL
Qty: 30 TABLET | Refills: 0 | Status: SHIPPED | OUTPATIENT
Start: 2023-12-13 | End: 2023-12-13

## 2023-12-13 ASSESSMENT — ENCOUNTER SYMPTOMS
ABDOMINAL PAIN: 0
CHEST TIGHTNESS: 0
SHORTNESS OF BREATH: 0
BLOOD IN STOOL: 0

## 2023-12-13 NOTE — PROGRESS NOTES
Medications on File Prior to Visit   Medication Sig Dispense Refill    perphenazine 2 MG tablet TAKE 1 TABLET BY MOUTH NIGHTLY FOR OCD 31 tablet 1    sertraline (ZOLOFT) 50 MG tablet TAKE 1 TABLET BY MOUTH DAILY. TO BE TAKEN WITH 100MG FOR TOTAL 150MG DAILY 31 tablet 2    sertraline (ZOLOFT) 100 MG tablet TAKE 1 TABLET BY MOUTH ONCE A DAY FOR OCD TAKE WITH 50MG 31 tablet 2    loratadine (CLARITIN) 10 MG tablet TAKE ONE TABLET BY MOUTH ONCE A DAY FOR ALLERGIES . 30 tablet 1    LIDOCAINE PAIN RELIEF 4 % external patch PLACE 1 PATCH ONTO THE SKIN OF LEFT HIP DAILY - ON 7A, OFF 7P . 30 each 1    mineral oil-hydrophilic petrolatum (AQUAPHOR) ointment Apply topically nightly to vulva and buttock region for prevention. 396 g 1    propranolol (INDERAL) 40 MG tablet TAKE ONE TABLET BY MOUTH THREE TIMES DAILY FOR HYPERKINESIA/OCD.  93 tablet 11    acetaminophen (ACETAMINOPHEN EXTRA STRENGTH) 500 MG tablet TAKE ONE TABLET BY MOUTH THREE TIMES DAILY FOR PAIN OF HIP 93 tablet 11    clomiPRAMINE (ANAFRANIL) 50 MG capsule TAKE 2 CAPSULES BY MOUTH AT BEDTIME FOR OCD 62 capsule 11    diclofenac sodium (VOLTAREN) 1 % GEL Apply 4 g topically 4 times daily 100 g 3    loperamide (RA ANTI-DIARRHEAL) 2 MG capsule Take 1 capsule by mouth 2 times daily as needed for Diarrhea 20 capsule 1    Calcium-Vitamin D-Vitamin K (VIACTIV CALCIUM PLUS D) 650-12.5-40 MG-MCG-MCG CHEW Take 1 tablet by mouth daily 30 tablet 11    Clotrimazole 1 % OINT Apply 1 each topically 2 times daily as needed (rash) 60 g 1    psyllium 0.52 g capsule Take 1 capsule by mouth daily as needed (constipation) 30 capsule 0    ciclopirox (LOPROX) 0.77 % cream Apply topically 2 times daily as needed (for toe web fungal infection) Use for toe webs for fungal infection 1 each 5    Diapers & Supplies MISC USE 1 6 TIMES A  each 3    Disposable Gloves (VINYL GLOVES MEDIUM) MISC FACILITY REQUESTING ORDER FOR EXAM GLOVES 100 each 5     No current facility-administered

## 2023-12-15 RX ORDER — PERPHENAZINE 2 MG/1
TABLET ORAL
Qty: 31 TABLET | Refills: 11 | OUTPATIENT
Start: 2023-12-15

## 2023-12-29 RX ORDER — HYDROXYZINE HYDROCHLORIDE 25 MG/1
25 TABLET, FILM COATED ORAL NIGHTLY
Qty: 50 TABLET | Refills: 11 | Status: SHIPPED | OUTPATIENT
Start: 2023-12-29

## 2024-01-03 RX ORDER — HYDROXYZINE HYDROCHLORIDE 25 MG/1
25 TABLET, FILM COATED ORAL NIGHTLY
Qty: 29 TABLET | Refills: 11 | Status: SHIPPED | OUTPATIENT
Start: 2024-01-03

## 2024-01-10 NOTE — PROGRESS NOTES
Outpatient Psychiatry Integrative Care  Initial Psychiatric Evaluation   [facility]     Patient name: Meredith Knapp  YOB: 1965  MRN: [id number]  Payor: Payor: MEDICARE / Plan: MEDICARE PART A AND B / Product Type: *No Product type* /   Day of Service: 1/11/2024      Referring Primary Care Clinician: Tracee Renae MD    Face to Face Time: 60 minutes      Chief Complaint:     Chief Complaint   Patient presents with    New Patient       Assessment and Plan:     Meredith Knapp is a 58 y.o. White (non-) female with a history of autism and OCD who presents to outpatient primary care psychiatry on 1/11/2024 for psychiatric evaluation and medication check.    Note: Patient referred for medication check. All medications are appropriate, and will hand back to PCP Dr. Renae for prescribing.    Patient meets criteria for level 3 autism. Patient significant caregiving needs as she is nonverbal and is reliant on staff for assistance in most activities of daily living.  Associated with the patient's autism is noticeable OCD behaviors including ritualistic behaviors such as picking things up off the floor and throwing them over her shoulder.      Patient has been on current medications for a long time with noted benefit and no noted side effects or adverse events appropriate to continue Zoloft, clomipramine, and perphenazine, to be written by PCP.    DSM 5 Diagnosis:     1. Obsessive-compulsive disorder, unspecified type  2. Autism    - Continue Zoloft 150mg, to be written by PCP  - Continue Clomipramine 100mg, to be written by PCP  - Continue Perphenazine 2mg, to be written by PCP    Discussed all prescribed medications including risks/benefits/side effects/alternatives. Discussed nonpharmacologic treatment. Patient expressed understanding and agreement with the current treatment plan      Safety    Overall the safety risk is low for future dangerousness because the

## 2024-01-11 ENCOUNTER — OFFICE VISIT (OUTPATIENT)
Dept: PSYCHIATRY | Age: 59
End: 2024-01-11
Payer: MEDICARE

## 2024-01-11 VITALS
HEART RATE: 81 BPM | BODY MASS INDEX: 36.01 KG/M2 | WEIGHT: 237.6 LBS | DIASTOLIC BLOOD PRESSURE: 62 MMHG | SYSTOLIC BLOOD PRESSURE: 112 MMHG | HEIGHT: 68 IN

## 2024-01-11 DIAGNOSIS — F42.9 OBSESSIVE-COMPULSIVE DISORDER, UNSPECIFIED TYPE: Primary | ICD-10-CM

## 2024-01-11 DIAGNOSIS — F42.2 MIXED OBSESSIONAL THOUGHTS AND ACTS: ICD-10-CM

## 2024-01-11 DIAGNOSIS — F84.0 AUTISM: ICD-10-CM

## 2024-01-11 PROCEDURE — 1036F TOBACCO NON-USER: CPT | Performed by: STUDENT IN AN ORGANIZED HEALTH CARE EDUCATION/TRAINING PROGRAM

## 2024-01-11 PROCEDURE — 90792 PSYCH DIAG EVAL W/MED SRVCS: CPT | Performed by: STUDENT IN AN ORGANIZED HEALTH CARE EDUCATION/TRAINING PROGRAM

## 2024-01-11 RX ORDER — SERTRALINE HYDROCHLORIDE 100 MG/1
TABLET, FILM COATED ORAL
Qty: 31 TABLET | Refills: 2 | Status: SHIPPED | OUTPATIENT
Start: 2024-01-11

## 2024-01-11 RX ORDER — CLOMIPRAMINE HYDROCHLORIDE 50 MG/1
CAPSULE ORAL
Qty: 62 CAPSULE | Refills: 11 | Status: SHIPPED | OUTPATIENT
Start: 2024-01-11

## 2024-01-11 RX ORDER — PERPHENAZINE 2 MG/1
2 TABLET ORAL NIGHTLY
Qty: 31 TABLET | Refills: 1 | Status: SHIPPED | OUTPATIENT
Start: 2024-01-11

## 2024-03-12 DIAGNOSIS — F42.2 MIXED OBSESSIONAL THOUGHTS AND ACTS: ICD-10-CM

## 2024-03-12 RX ORDER — PERPHENAZINE 2 MG/1
TABLET ORAL
Qty: 30 TABLET | Refills: 11 | Status: SHIPPED | OUTPATIENT
Start: 2024-03-12

## 2024-03-26 ENCOUNTER — TELEPHONE (OUTPATIENT)
Dept: FAMILY MEDICINE CLINIC | Age: 59
End: 2024-03-26

## 2024-03-26 NOTE — TELEPHONE ENCOUNTER
----- Message from Keisha Richter Anuptiffanie sent at 3/26/2024  1:26 PM EDT -----  Regarding: ECC Message to Provider  ECC Message to Provider    Relationship to Patient: Guardian sister      Additional Information wants to send the papers for guardianship to the practice   --------------------------------------------------------------------------------------------------------------------------    Call Back Information: OK to leave message on voicemail  Preferred Call Back Number: Phone [phone]

## 2024-03-26 NOTE — TELEPHONE ENCOUNTER
Spoke with patient's sister and let her know that it is ok for her to drop of paper work for Dr. Renae to fill out regarding guardian ship.

## 2024-04-11 DIAGNOSIS — F42.2 MIXED OBSESSIONAL THOUGHTS AND ACTS: ICD-10-CM

## 2024-04-12 RX ORDER — SERTRALINE HYDROCHLORIDE 100 MG/1
TABLET, FILM COATED ORAL
Qty: 31 TABLET | Refills: 2 | Status: SHIPPED | OUTPATIENT
Start: 2024-04-12

## 2024-04-22 ENCOUNTER — OFFICE VISIT (OUTPATIENT)
Dept: FAMILY MEDICINE CLINIC | Age: 59
End: 2024-04-22

## 2024-04-22 VITALS
HEART RATE: 94 BPM | SYSTOLIC BLOOD PRESSURE: 124 MMHG | DIASTOLIC BLOOD PRESSURE: 78 MMHG | WEIGHT: 235 LBS | OXYGEN SATURATION: 98 % | BODY MASS INDEX: 35.73 KG/M2

## 2024-04-22 DIAGNOSIS — F84.0 AUTISM: Primary | ICD-10-CM

## 2024-04-22 ASSESSMENT — ENCOUNTER SYMPTOMS
FACIAL SWELLING: 0
WHEEZING: 0
EYE REDNESS: 0
STRIDOR: 0
VOMITING: 0

## 2024-04-22 ASSESSMENT — PATIENT HEALTH QUESTIONNAIRE - PHQ9: DEPRESSION UNABLE TO ASSESS: FUNCTIONAL CAPACITY MOTIVATION LIMITS ACCURACY

## 2024-04-22 NOTE — PROGRESS NOTES
Housing Stability: Unknown (2/23/2023)    Housing Stability Vital Sign     Unable to Pay for Housing in the Last Year: Not on file     Number of Places Lived in the Last Year: Not on file     Unstable Housing in the Last Year: No       Current Outpatient Medications on File Prior to Visit   Medication Sig Dispense Refill    sertraline (ZOLOFT) 100 MG tablet TAKE 1 TABLET BY MOUTH ONCE A DAY FOR OCD TAKE WITH 50MG 31 tablet 2    sertraline (ZOLOFT) 50 MG tablet Take 1 tablet by mouth daily 31 tablet 2    perphenazine 2 MG tablet TAKE 1 TABLET BY MOUTH NIGHTLY FOR OCD 30 tablet 11    clomiPRAMINE (ANAFRANIL) 50 MG capsule TAKE 2 CAPSULES BY MOUTH AT BEDTIME FOR OCD 62 capsule 11    hydrOXYzine HCl (ATARAX) 25 MG tablet TAKE 1 TABLET BY MOUTH NIGHTLY 29 tablet 11    LIDOCAINE PAIN RELIEF 4 % external patch PLACE 1 PATCH ONTO THE SKIN OF LEFT HIP DAILY - ON 7A, OFF 7P . 30 patch 11    loratadine (CLARITIN) 10 MG tablet TAKE ONE TABLET BY MOUTH ONCE A DAY FOR ALLERGIES . 31 tablet 11    propranolol (INDERAL) 40 MG tablet TAKE ONE TABLET BY MOUTH THREE TIMES DAILY FOR HYPERKINESIA/OCD. 93 tablet 11    acetaminophen (ACETAMINOPHEN EXTRA STRENGTH) 500 MG tablet TAKE ONE TABLET BY MOUTH THREE TIMES DAILY FOR PAIN OF HIP 93 tablet 11    Clotrimazole 1 % OINT Apply 1 each topically 2 times daily as needed (rash) 60 g 1    ciclopirox (LOPROX) 0.77 % cream Apply topically 2 times daily as needed (for toe web fungal infection) Use for toe webs for fungal infection 1 each 5    Diapers & Supplies MISC USE 1 6 TIMES A  each 3    Disposable Gloves (VINYL GLOVES MEDIUM) MISC FACILITY REQUESTING ORDER FOR EXAM GLOVES 100 each 5    mineral oil-hydrophilic petrolatum (AQUAPHOR) ointment Apply topically nightly to vulva and buttock region for prevention. (Patient not taking: Reported on 1/11/2024) 396 g 1    diclofenac sodium (VOLTAREN) 1 % GEL Apply 4 g topically 4 times daily (Patient not taking: Reported on 1/11/2024) 100 g 3

## 2024-05-03 ENCOUNTER — TELEPHONE (OUTPATIENT)
Dept: FAMILY MEDICINE CLINIC | Age: 59
End: 2024-05-03

## 2024-05-03 NOTE — TELEPHONE ENCOUNTER
Brandi called stating she noticed there wasn't a f/u appt for the pt with Dr Chaudhary and wanted to know if the doctor wanted to f/u with her. Please advise Brandi.     Brandi   [phone]

## 2024-05-06 DIAGNOSIS — Z12.11 ENCOUNTER FOR SCREENING COLONOSCOPY: Primary | ICD-10-CM

## 2024-06-05 DIAGNOSIS — I10 PRIMARY HYPERTENSION: ICD-10-CM

## 2024-06-05 DIAGNOSIS — G89.29 CHRONIC HIP PAIN, LEFT: ICD-10-CM

## 2024-06-05 DIAGNOSIS — M25.552 CHRONIC HIP PAIN, LEFT: ICD-10-CM

## 2024-06-05 RX ORDER — ACETAMINOPHEN 500 MG
TABLET ORAL
Qty: 93 TABLET | Refills: 0 | Status: SHIPPED | OUTPATIENT
Start: 2024-06-05

## 2024-06-05 RX ORDER — PROPRANOLOL HYDROCHLORIDE 40 MG/1
TABLET ORAL
Qty: 93 TABLET | Refills: 0 | Status: SHIPPED | OUTPATIENT
Start: 2024-06-05

## 2024-07-15 DIAGNOSIS — F42.2 MIXED OBSESSIONAL THOUGHTS AND ACTS: ICD-10-CM

## 2024-07-15 RX ORDER — SERTRALINE HYDROCHLORIDE 100 MG/1
TABLET, FILM COATED ORAL
Qty: 31 TABLET | Refills: 11 | Status: SHIPPED | OUTPATIENT
Start: 2024-07-15

## 2024-08-05 DIAGNOSIS — G89.29 CHRONIC HIP PAIN, LEFT: ICD-10-CM

## 2024-08-05 DIAGNOSIS — I10 PRIMARY HYPERTENSION: ICD-10-CM

## 2024-08-05 DIAGNOSIS — M25.552 CHRONIC HIP PAIN, LEFT: ICD-10-CM

## 2024-08-05 RX ORDER — PROPRANOLOL HYDROCHLORIDE 40 MG/1
TABLET ORAL
Qty: 93 TABLET | Refills: 0 | Status: SHIPPED | OUTPATIENT
Start: 2024-08-05

## 2024-08-05 RX ORDER — ACETAMINOPHEN 500 MG
TABLET ORAL
Qty: 93 TABLET | Refills: 0 | Status: SHIPPED | OUTPATIENT
Start: 2024-08-05

## 2024-08-05 NOTE — TELEPHONE ENCOUNTER
Medication:   Requested Prescriptions     Pending Prescriptions Disp Refills    propranolol (INDERAL) 40 MG tablet 93 tablet 0     Sig: Take ! Tablet 3 times a day    acetaminophen (TYLENOL) 500 MG tablet 93 tablet 0     Sig: TAKE ONE TABLET BY MOUTH THREE TIMES DAILY FOR PAIN OF HIP     Last Filled:  #93 of each on 6/5/2024    Last appt: 04/24/24  Next appt: 08/06/24    Last OARRS:       11/15/2016    11:06 AM   RX Monitoring   Attestation The Prescription Monitoring Report for this patient was reviewed today.   Periodic Controlled Substance Monitoring No signs of potential drug abuse or diversion identified.

## 2024-09-09 ENCOUNTER — TELEPHONE (OUTPATIENT)
Dept: FAMILY MEDICINE CLINIC | Age: 59
End: 2024-09-09

## 2024-09-09 DIAGNOSIS — I10 PRIMARY HYPERTENSION: ICD-10-CM

## 2024-09-09 DIAGNOSIS — Z00.00 ROUTINE GENERAL MEDICAL EXAMINATION AT A HEALTH CARE FACILITY: Primary | ICD-10-CM

## 2024-09-12 ENCOUNTER — TELEPHONE (OUTPATIENT)
Dept: FAMILY MEDICINE CLINIC | Age: 59
End: 2024-09-12

## 2024-09-19 ENCOUNTER — OFFICE VISIT (OUTPATIENT)
Dept: FAMILY MEDICINE CLINIC | Age: 59
End: 2024-09-19

## 2024-09-19 VITALS
HEART RATE: 87 BPM | WEIGHT: 227 LBS | OXYGEN SATURATION: 97 % | SYSTOLIC BLOOD PRESSURE: 112 MMHG | HEIGHT: 68 IN | TEMPERATURE: 96.9 F | BODY MASS INDEX: 34.4 KG/M2 | DIASTOLIC BLOOD PRESSURE: 70 MMHG

## 2024-09-19 DIAGNOSIS — N18.30 STAGE 3 CHRONIC KIDNEY DISEASE, UNSPECIFIED WHETHER STAGE 3A OR 3B CKD (HCC): ICD-10-CM

## 2024-09-19 DIAGNOSIS — I10 PRIMARY HYPERTENSION: Primary | ICD-10-CM

## 2024-09-19 DIAGNOSIS — F84.0 AUTISM: ICD-10-CM

## 2024-09-19 DIAGNOSIS — R19.7 DIARRHEA, UNSPECIFIED TYPE: ICD-10-CM

## 2024-09-19 DIAGNOSIS — F33.1 MODERATE EPISODE OF RECURRENT MAJOR DEPRESSIVE DISORDER (HCC): ICD-10-CM

## 2024-09-19 DIAGNOSIS — G89.29 CHRONIC HIP PAIN, LEFT: ICD-10-CM

## 2024-09-19 DIAGNOSIS — M25.552 CHRONIC HIP PAIN, LEFT: ICD-10-CM

## 2024-09-19 DIAGNOSIS — G47.00 INSOMNIA, UNSPECIFIED TYPE: ICD-10-CM

## 2024-09-19 RX ORDER — HYDROXYZINE HYDROCHLORIDE 25 MG/1
25 TABLET, FILM COATED ORAL NIGHTLY
Qty: 29 TABLET | Refills: 11 | Status: SHIPPED | OUTPATIENT
Start: 2024-09-19

## 2024-09-19 RX ORDER — LOPERAMIDE HCL 2 MG
2 CAPSULE ORAL 2 TIMES DAILY PRN
Qty: 20 CAPSULE | Refills: 1 | Status: SHIPPED | OUTPATIENT
Start: 2024-09-19

## 2024-09-19 RX ORDER — LANOLIN ALCOHOL/MO/W.PET/CERES
3 CREAM (GRAM) TOPICAL DAILY
Qty: 30 TABLET | Refills: 11 | Status: SHIPPED | OUTPATIENT
Start: 2024-09-19

## 2024-09-19 SDOH — ECONOMIC STABILITY: FOOD INSECURITY: WITHIN THE PAST 12 MONTHS, YOU WORRIED THAT YOUR FOOD WOULD RUN OUT BEFORE YOU GOT MONEY TO BUY MORE.: NEVER TRUE

## 2024-09-19 SDOH — ECONOMIC STABILITY: INCOME INSECURITY: HOW HARD IS IT FOR YOU TO PAY FOR THE VERY BASICS LIKE FOOD, HOUSING, MEDICAL CARE, AND HEATING?: NOT HARD AT ALL

## 2024-09-19 SDOH — ECONOMIC STABILITY: FOOD INSECURITY: WITHIN THE PAST 12 MONTHS, THE FOOD YOU BOUGHT JUST DIDN'T LAST AND YOU DIDN'T HAVE MONEY TO GET MORE.: NEVER TRUE

## 2024-09-19 ASSESSMENT — PATIENT HEALTH QUESTIONNAIRE - PHQ9
2. FEELING DOWN, DEPRESSED OR HOPELESS: NOT AT ALL
3. TROUBLE FALLING OR STAYING ASLEEP: NOT AT ALL
5. POOR APPETITE OR OVEREATING: NOT AT ALL
SUM OF ALL RESPONSES TO PHQ9 QUESTIONS 1 & 2: 0
1. LITTLE INTEREST OR PLEASURE IN DOING THINGS: NOT AT ALL
SUM OF ALL RESPONSES TO PHQ QUESTIONS 1-9: 0
8. MOVING OR SPEAKING SO SLOWLY THAT OTHER PEOPLE COULD HAVE NOTICED. OR THE OPPOSITE, BEING SO FIGETY OR RESTLESS THAT YOU HAVE BEEN MOVING AROUND A LOT MORE THAN USUAL: NOT AT ALL
7. TROUBLE CONCENTRATING ON THINGS, SUCH AS READING THE NEWSPAPER OR WATCHING TELEVISION: NOT AT ALL
SUM OF ALL RESPONSES TO PHQ QUESTIONS 1-9: 0
9. THOUGHTS THAT YOU WOULD BE BETTER OFF DEAD, OR OF HURTING YOURSELF: NOT AT ALL
6. FEELING BAD ABOUT YOURSELF - OR THAT YOU ARE A FAILURE OR HAVE LET YOURSELF OR YOUR FAMILY DOWN: NOT AT ALL
4. FEELING TIRED OR HAVING LITTLE ENERGY: NOT AT ALL

## 2024-09-20 DIAGNOSIS — M25.552 CHRONIC HIP PAIN, LEFT: ICD-10-CM

## 2024-09-20 DIAGNOSIS — G89.29 CHRONIC HIP PAIN, LEFT: ICD-10-CM

## 2024-09-20 DIAGNOSIS — I10 PRIMARY HYPERTENSION: ICD-10-CM

## 2024-09-21 RX ORDER — PSEUDOEPHED/ACETAMINOPH/DIPHEN 30MG-500MG
TABLET ORAL
Qty: 93 TABLET | Refills: 11 | Status: SHIPPED | OUTPATIENT
Start: 2024-09-21

## 2024-09-21 RX ORDER — PROPRANOLOL HYDROCHLORIDE 40 MG/1
TABLET ORAL
Qty: 93 TABLET | Refills: 11 | Status: SHIPPED | OUTPATIENT
Start: 2024-09-21

## 2024-10-08 ENCOUNTER — TELEPHONE (OUTPATIENT)
Dept: FAMILY MEDICINE CLINIC | Age: 59
End: 2024-10-08

## 2024-10-08 RX ORDER — DEXTROMETHORPHAN HYDROBROMIDE AND PROMETHAZINE HYDROCHLORIDE 15; 6.25 MG/5ML; MG/5ML
1.25 SYRUP ORAL 4 TIMES DAILY PRN
Qty: 118 ML | Refills: 0 | Status: SHIPPED | OUTPATIENT
Start: 2024-10-08 | End: 2024-11-01

## 2024-10-08 NOTE — TELEPHONE ENCOUNTER
Benjamin from [facility] would like a script for cough syrup sent to [facility] - [facility] [address] [address] [phone] - F [phone] [[id number]] [facility] - [place], [place] - [address] [address] [phone] - F [phone] [[id number]]    PATIENT HAS A DRY COUGH

## 2024-10-31 ENCOUNTER — TELEPHONE (OUTPATIENT)
Dept: FAMILY MEDICINE CLINIC | Age: 59
End: 2024-10-31

## 2024-10-31 DIAGNOSIS — N39.0 URINARY TRACT INFECTION WITHOUT HEMATURIA, SITE UNSPECIFIED: Primary | ICD-10-CM

## 2024-10-31 NOTE — TELEPHONE ENCOUNTER
[facility] calling to request a order for urinalysis; possible UTI. Please call [phone]. Thanks

## 2024-12-17 DIAGNOSIS — G89.29 CHRONIC HIP PAIN, LEFT: ICD-10-CM

## 2024-12-17 DIAGNOSIS — M25.552 CHRONIC HIP PAIN, LEFT: ICD-10-CM

## 2024-12-17 DIAGNOSIS — J30.9 ALLERGIC RHINITIS, UNSPECIFIED SEASONALITY, UNSPECIFIED TRIGGER: ICD-10-CM

## 2024-12-17 RX ORDER — LORATADINE 10 MG/1
TABLET ORAL
Qty: 31 TABLET | Refills: 11 | Status: SHIPPED | OUTPATIENT
Start: 2024-12-17

## 2024-12-17 RX ORDER — LIDOCAINE 4 G/G
PATCH TOPICAL
Qty: 30 PATCH | Refills: 11 | Status: SHIPPED | OUTPATIENT
Start: 2024-12-17

## 2025-01-16 DIAGNOSIS — F42.2 MIXED OBSESSIONAL THOUGHTS AND ACTS: ICD-10-CM

## 2025-01-16 RX ORDER — CLOMIPRAMINE HYDROCHLORIDE 50 MG/1
CAPSULE ORAL
Qty: 60 CAPSULE | Refills: 11 | OUTPATIENT
Start: 2025-01-16

## 2025-01-30 DIAGNOSIS — F42.2 MIXED OBSESSIONAL THOUGHTS AND ACTS: ICD-10-CM

## 2025-02-03 RX ORDER — CLOMIPRAMINE HYDROCHLORIDE 50 MG/1
CAPSULE ORAL
Qty: 60 CAPSULE | Refills: 11 | Status: SHIPPED | OUTPATIENT
Start: 2025-02-03

## 2025-02-18 ENCOUNTER — TELEPHONE (OUTPATIENT)
Dept: FAMILY MEDICINE CLINIC | Age: 60
End: 2025-02-18

## 2025-02-18 DIAGNOSIS — R82.90 MALODOROUS URINE: Primary | ICD-10-CM

## 2025-02-18 NOTE — TELEPHONE ENCOUNTER
[facility] called and patient has some strong smelling urine. Brandi is requesting a order put in the system.

## 2025-02-20 DIAGNOSIS — R82.90 MALODOROUS URINE: ICD-10-CM

## 2025-02-21 LAB
AMORPHOUS: PRESENT
BACTERIA URNS QL MICRO: ABNORMAL /HPF
BILIRUB UR QL STRIP.AUTO: NEGATIVE
CLARITY UR: ABNORMAL
COLOR UR: YELLOW
EPI CELLS #/AREA URNS AUTO: 9 /HPF (ref 0–5)
GLUCOSE UR STRIP.AUTO-MCNC: NEGATIVE MG/DL
HGB UR QL STRIP.AUTO: NEGATIVE
HYALINE CASTS #/AREA URNS AUTO: 2 /LPF (ref 0–8)
KETONES UR STRIP.AUTO-MCNC: NEGATIVE MG/DL
LEUKOCYTE ESTERASE UR QL STRIP.AUTO: ABNORMAL
NITRITE UR QL STRIP.AUTO: POSITIVE
PH UR STRIP.AUTO: 6 [PH] (ref 5–8)
PROT UR STRIP.AUTO-MCNC: NEGATIVE MG/DL
RBC CLUMPS #/AREA URNS AUTO: 0 /HPF (ref 0–4)
SP GR UR STRIP.AUTO: 1.02 (ref 1–1.03)
UA COMPLETE W REFLEX CULTURE PNL UR: ABNORMAL
UA DIPSTICK W REFLEX MICRO PNL UR: YES
URN SPEC COLLECT METH UR: ABNORMAL
UROBILINOGEN UR STRIP-ACNC: 0.2 E.U./DL
WBC #/AREA URNS AUTO: 7 /HPF (ref 0–5)

## 2025-02-21 RX ORDER — NITROFURANTOIN 25; 75 MG/1; MG/1
100 CAPSULE ORAL 2 TIMES DAILY
Qty: 20 CAPSULE | Refills: 0 | Status: SHIPPED | OUTPATIENT
Start: 2025-02-21 | End: 2025-03-03

## 2025-03-17 DIAGNOSIS — R19.7 DIARRHEA, UNSPECIFIED TYPE: ICD-10-CM

## 2025-03-17 NOTE — TELEPHONE ENCOUNTER
Please Advise        Medication:   Requested Prescriptions     Pending Prescriptions Disp Refills    loperamide (IMODIUM) 2 MG capsule [Pharmacy Med Name: LOPERAMIDE 2 MG CAPSULE] 20 capsule 11     Sig: TAKE 1 CAPSULE BY MOUTH TWICE A DAY AS NEEDED FOR DIARRHEA        Last Filled:   2/25/2025     Patient Phone Number: [phone] (home)     Last appt: 9/19/2024   Next appt: Visit date not found    Last OARRS:       11/15/2016    11:06 AM   RX Monitoring   Attestation The Prescription Monitoring Report for this patient was reviewed today.   Periodic Controlled Substance Monitoring No signs of potential drug abuse or diversion identified.

## 2025-03-18 RX ORDER — LOPERAMIDE HYDROCHLORIDE 2 MG/1
CAPSULE ORAL
Qty: 20 CAPSULE | Refills: 11 | Status: SHIPPED | OUTPATIENT
Start: 2025-03-18

## 2025-03-24 ENCOUNTER — HOSPITAL ENCOUNTER (EMERGENCY)
Age: 60
Discharge: HOME | End: 2025-03-25
Attending: EMERGENCY MEDICINE
Payer: MEDICARE

## 2025-03-24 DIAGNOSIS — T50.901A ACCIDENTAL MEDICATION ERROR, INITIAL ENCOUNTER: Primary | ICD-10-CM

## 2025-03-24 PROCEDURE — 99284 EMERGENCY DEPT VISIT MOD MDM: CPT

## 2025-03-24 PROCEDURE — 2580000003 HC RX 258: Performed by: EMERGENCY MEDICINE

## 2025-03-24 PROCEDURE — 96360 HYDRATION IV INFUSION INIT: CPT

## 2025-03-24 PROCEDURE — 93005 ELECTROCARDIOGRAM TRACING: CPT | Performed by: EMERGENCY MEDICINE

## 2025-03-24 RX ORDER — SODIUM CHLORIDE, SODIUM LACTATE, POTASSIUM CHLORIDE, AND CALCIUM CHLORIDE .6; .31; .03; .02 G/100ML; G/100ML; G/100ML; G/100ML
1000 INJECTION, SOLUTION INTRAVENOUS ONCE
Status: COMPLETED | OUTPATIENT
Start: 2025-03-24 | End: 2025-03-24

## 2025-03-24 RX ADMIN — SODIUM CHLORIDE, SODIUM LACTATE, POTASSIUM CHLORIDE, AND CALCIUM CHLORIDE 1000 ML: .6; .31; .03; .02 INJECTION, SOLUTION INTRAVENOUS at 22:48

## 2025-03-24 NOTE — ED TRIAGE NOTES
Pt wheeled into the ER from home  with caregiver with complaint of given wrong medication.  Pt was given famotidine 40mg, melatonin 3mg, mirtazapine 7.5mg, Nadolol 20mg, Verapamil 80mg, and Isibloom. Pt not on any cardiac medication. Pt given medication at 630pm. Respirations are even and unlabored. Skin is warm, appropriate for ethnicity, and dry. No acute distress noted.

## 2025-03-25 VITALS
TEMPERATURE: 98.7 F | SYSTOLIC BLOOD PRESSURE: 110 MMHG | DIASTOLIC BLOOD PRESSURE: 61 MMHG | HEART RATE: 79 BPM | RESPIRATION RATE: 22 BRPM | WEIGHT: 231.5 LBS | BODY MASS INDEX: 35.09 KG/M2 | HEIGHT: 68 IN | OXYGEN SATURATION: 95 %

## 2025-03-25 LAB
ANION GAP SERPL CALCULATED.3IONS-SCNC: 9 MMOL/L (ref 3–16)
BASOPHILS # BLD: 0.1 K/UL (ref 0–0.2)
BASOPHILS NFR BLD: 1 %
BUN SERPL-MCNC: 23 MG/DL (ref 7–20)
CALCIUM SERPL-MCNC: 8.6 MG/DL (ref 8.3–10.6)
CHLORIDE SERPL-SCNC: 106 MMOL/L (ref 99–110)
CO2 SERPL-SCNC: 25 MMOL/L (ref 21–32)
CREAT SERPL-MCNC: 1.2 MG/DL (ref 0.6–1.2)
DEPRECATED RDW RBC AUTO: 13 % (ref 12.4–15.4)
EKG ATRIAL RATE: 77 BPM
EKG DIAGNOSIS: NORMAL
EKG P AXIS: 48 DEGREES
EKG P-R INTERVAL: 144 MS
EKG Q-T INTERVAL: 394 MS
EKG QRS DURATION: 80 MS
EKG QTC CALCULATION (BAZETT): 445 MS
EKG R AXIS: 55 DEGREES
EKG T AXIS: 29 DEGREES
EKG VENTRICULAR RATE: 77 BPM
EOSINOPHIL # BLD: 0.6 K/UL (ref 0–0.6)
EOSINOPHIL NFR BLD: 6.6 %
GFR SERPLBLD CREATININE-BSD FMLA CKD-EPI: 51 ML/MIN/{1.73_M2}
GLUCOSE SERPL-MCNC: 104 MG/DL (ref 70–99)
HCT VFR BLD AUTO: 39.3 % (ref 36–48)
HGB BLD-MCNC: 12.9 G/DL (ref 12–16)
LYMPHOCYTES # BLD: 3 K/UL (ref 1–5.1)
LYMPHOCYTES NFR BLD: 31.8 %
MCH RBC QN AUTO: 29.7 PG (ref 26–34)
MCHC RBC AUTO-ENTMCNC: 32.7 G/DL (ref 31–36)
MCV RBC AUTO: 90.7 FL (ref 80–100)
MONOCYTES # BLD: 0.6 K/UL (ref 0–1.3)
MONOCYTES NFR BLD: 6.4 %
NEUTROPHILS # BLD: 5.2 K/UL (ref 1.7–7.7)
NEUTROPHILS NFR BLD: 54.2 %
PLATELET # BLD AUTO: 190 K/UL (ref 135–450)
PMV BLD AUTO: 9.4 FL (ref 5–10.5)
POTASSIUM SERPL-SCNC: 4.1 MMOL/L (ref 3.5–5.1)
RBC # BLD AUTO: 4.34 M/UL (ref 4–5.2)
SODIUM SERPL-SCNC: 140 MMOL/L (ref 136–145)
WBC # BLD AUTO: 9.5 K/UL (ref 4–11)

## 2025-03-25 PROCEDURE — 96361 HYDRATE IV INFUSION ADD-ON: CPT

## 2025-03-25 PROCEDURE — 85025 COMPLETE CBC W/AUTO DIFF WBC: CPT

## 2025-03-25 PROCEDURE — 80048 BASIC METABOLIC PNL TOTAL CA: CPT

## 2025-03-25 PROCEDURE — 2580000003 HC RX 258: Performed by: STUDENT IN AN ORGANIZED HEALTH CARE EDUCATION/TRAINING PROGRAM

## 2025-03-25 RX ORDER — SODIUM CHLORIDE, SODIUM LACTATE, POTASSIUM CHLORIDE, AND CALCIUM CHLORIDE .6; .31; .03; .02 G/100ML; G/100ML; G/100ML; G/100ML
1000 INJECTION, SOLUTION INTRAVENOUS ONCE
Status: COMPLETED | OUTPATIENT
Start: 2025-03-25 | End: 2025-03-25

## 2025-03-25 RX ADMIN — SODIUM CHLORIDE, SODIUM LACTATE, POTASSIUM CHLORIDE, AND CALCIUM CHLORIDE 1000 ML: .6; .31; .03; .02 INJECTION, SOLUTION INTRAVENOUS at 01:28

## 2025-03-25 NOTE — ED PROVIDER NOTES
THE [facility]  EMERGENCY DEPARTMENT ENCOUNTER          ATTENDING PHYSICIAN NOTE       Date of evaluation: 3/24/2025    Chief Complaint   Accidental ingestion      History of Present Illness     Meredith Knapp is a 59 y.o. female who presents to the emergency department after receiving the wrong medications at her care facility earlier today.    Patient has history of nonverbal severe autism, CKD and hyperlipidemia.  Earlier this evening at approximately 6 PM she received the following medications incorrectly from her care staff.  This includes famotidine 40 mg, melatonin 3 mg, mirtazapine 7.5 mg, nadolol 20 mg and verapamil 80 mg.  She also received a dose of birth control pills.  She was asymptomatic but given the air she was brought in for further evaluation.  The patient is behaving at her normal baseline per care team who is at bedside.  Patient is not able to provide any further details due to her nonverbal state.    ASSESSMENT / PLAN  (MEDICAL DECISION MAKING)     INITIAL VITALS: BP: 90/60, Temp: 98.7 °F (37.1 °C), Pulse: 78, Respirations: 16, SpO2: 95 %      Meredith Knapp is a 59 y.o. female who [facility] with concern for accidental ingestion of multiple medications as described above.  On initial evaluation she is alert and interacting with her care team.  She is not in any distress.  She is at her cognitive baseline per care team.  EKG showed no acute concerns including no dysrhythmias or interval prolongation.    Patient typically has systolic blood pressures in the low 110s but currently is having blood pressures in the mid 90s.  She appears to be asymptomatic from this.  No bradycardia.    Discussed with [facility] who recommended 8 hours total of observation since ingestion, which would place this at 2 AM.    I discussed this plan with the nursing team at her facility who is in agreement.    I do not believe that additional blood work is indicated given her lack of any

## 2025-03-25 NOTE — DISCHARGE INSTRUCTIONS
Jenny was washed after she excellently received the medications.  She had a couple low blood pressures but this resolved with fluids and time.  She is okay to resume her normal medications tomorrow

## 2025-03-25 NOTE — ED PROVIDER NOTES
THE [facility]  EMERGENCY DEPARTMENT ENCOUNTER          ATTENDING PHYSICIAN NOTE       Date of evaluation: 3/24/2025    ADDENDUM:      Care of this patient was assumed from Dr. Freedman.  The patient was seen for No chief complaint on file.  .  The patient's initial evaluation and plan have been discussed with the prior provider who initially evaluated the patient.  Nursing Notes, Past Medical Hx, Past Surgical Hx, Social Hx, Allergies, and Family Hx were all reviewed.    ASSESSMENT / PLAN  (MEDICAL DECISION MAKING)     Meredith Knapp is a 59 y.o. female with history of autism who is nonverbal at baseline and comes from a group home after she received the wrong patient's medications.  Please see initial providers note for history and physical.  Patient was turned over to me pending and observation until approximately 2 AM.    While I was observing the patient, she had a couple of blood pressures with maps in the 60s.  She remained asymptomatic.  However given the lower blood pressure readings I did elect to obtain basic labs which showed no JASS, electrolyte derangement or anemia.  She was given an additional 1 L of fluid and blood pressures improved.  She was observed for an additional 1 hour after the recommended timeline to ensure no further hypotension.  She is appropriate for discharge at this time.    Is this patient to be included in the SEP-1 core measure? No Exclusion criteria - the patient is NOT to be included for SEP-1 Core Measure due to: Infection is not suspected    Medical Decision Making  Problems Addressed:  Accidental medication error, initial encounter: undiagnosed new problem with uncertain prognosis    Amount and/or Complexity of Data Reviewed  External Data Reviewed: notes.  Labs: ordered. Decision-making details documented in ED Course.  ECG/medicine tests: ordered and independent interpretation performed. Decision-making details documented in ED Course.          Clinical Impression

## 2025-04-15 DIAGNOSIS — F42.2 MIXED OBSESSIONAL THOUGHTS AND ACTS: ICD-10-CM

## 2025-04-15 RX ORDER — PERPHENAZINE 2 MG/1
TABLET ORAL
Qty: 31 TABLET | Refills: 0 | Status: SHIPPED | OUTPATIENT
Start: 2025-04-15

## 2025-04-24 DIAGNOSIS — F42.2 MIXED OBSESSIONAL THOUGHTS AND ACTS: ICD-10-CM

## 2025-04-25 RX ORDER — PERPHENAZINE 2 MG/1
TABLET ORAL
Qty: 31 TABLET | Refills: 11 | Status: SHIPPED | OUTPATIENT
Start: 2025-04-25

## 2025-08-06 ENCOUNTER — TELEPHONE (OUTPATIENT)
Dept: FAMILY MEDICINE CLINIC | Age: 60
End: 2025-08-06

## 2025-08-20 ENCOUNTER — TELEPHONE (OUTPATIENT)
Dept: FAMILY MEDICINE CLINIC | Age: 60
End: 2025-08-20

## 2025-08-20 DIAGNOSIS — F42.2 MIXED OBSESSIONAL THOUGHTS AND ACTS: ICD-10-CM

## 2025-08-20 RX ORDER — SERTRALINE HYDROCHLORIDE 100 MG/1
TABLET, FILM COATED ORAL
Qty: 31 TABLET | Refills: 11 | Status: SHIPPED | OUTPATIENT
Start: 2025-08-20

## 2025-08-20 RX ORDER — KETOCONAZOLE 20 MG/ML
SHAMPOO, SUSPENSION TOPICAL
Qty: 120 ML | Refills: 4 | Status: SHIPPED | OUTPATIENT
Start: 2025-08-20

## 2025-08-20 RX ORDER — SERTRALINE HYDROCHLORIDE 100 MG/1
TABLET, FILM COATED ORAL
Qty: 30 TABLET | Refills: 0 | OUTPATIENT
Start: 2025-08-20

## 2025-08-21 ENCOUNTER — TELEPHONE (OUTPATIENT)
Dept: FAMILY MEDICINE CLINIC | Age: 60
End: 2025-08-21

## 2025-08-25 RX ORDER — KETOCONAZOLE 20 MG/G
CREAM TOPICAL
Qty: 30 G | Refills: 1 | Status: SHIPPED | OUTPATIENT
Start: 2025-08-25

## 2025-08-25 RX ORDER — KETOCONAZOLE 20 MG/G
CREAM TOPICAL
Qty: 30 G | Refills: 1 | Status: SHIPPED | OUTPATIENT
Start: 2025-08-25 | End: 2025-08-25 | Stop reason: SDUPTHER

## 2025-09-02 DIAGNOSIS — F42.2 MIXED OBSESSIONAL THOUGHTS AND ACTS: ICD-10-CM

## (undated) DEVICE — STERILE SURGICAL LUBRICANT, METAL TUBE: Brand: SURGILUBE

## (undated) DEVICE — SOLUTION SCRB 4OZ 10% POVIDONE IOD ANTIMIC BTL

## (undated) DEVICE — PACK,CYSTOSCOPY,PK III,AURORA: Brand: MEDLINE

## (undated) DEVICE — SYRINGE MED 10ML LUERLOCK TIP W/O SFTY DISP

## (undated) DEVICE — MERCY HEALTH WEST TURNOVER: Brand: MEDLINE INDUSTRIES, INC.

## (undated) DEVICE — SOLUTION IRRIG 3000ML STRL H2O USP UROMATIC PLAS CONT

## (undated) DEVICE — CYSTO/BLADDER IRRIGATION SET, REGULATING CLAMP

## (undated) DEVICE — TUR/ENDOSCOPIC CABLE, 10' (3.05 M): Brand: CONMED

## (undated) DEVICE — GLOVE SURG SZ 65 CRM LTX FREE POLYISOPRENE POLYMER BEAD ANTI

## (undated) DEVICE — BAG DRAINAGE FOR SIEMANS DORNIER

## (undated) DEVICE — SOLUTION IV IRRIG WATER 1000ML POUR BRL 2F7114

## (undated) DEVICE — SKIN MARKER REGULAR TIP WITH RULER CAP AND LABELS: Brand: DEVON

## (undated) DEVICE — TOWEL,OR,DSP,ST,BLUE,STD,4/PK,20PK/CS: Brand: MEDLINE

## (undated) DEVICE — GOWN SIRUS NONREIN XL W/TWL: Brand: MEDLINE INDUSTRIES, INC.

## (undated) DEVICE — ELECTRODE PT RET AD L9FT HI MOIST COND ADH HYDRGEL CORDED

## (undated) DEVICE — Z DISCONTINUED BY MEDLINE USE 2711682 TRAY SKIN PREP DRY W/ PREM GLV